# Patient Record
Sex: MALE | Race: WHITE | Employment: OTHER | URBAN - METROPOLITAN AREA
[De-identification: names, ages, dates, MRNs, and addresses within clinical notes are randomized per-mention and may not be internally consistent; named-entity substitution may affect disease eponyms.]

---

## 2019-10-19 ENCOUNTER — APPOINTMENT (OUTPATIENT)
Dept: CT IMAGING | Age: 84
DRG: 390 | End: 2019-10-19
Payer: MEDICARE

## 2019-10-19 ENCOUNTER — APPOINTMENT (OUTPATIENT)
Dept: GENERAL RADIOLOGY | Age: 84
DRG: 390 | End: 2019-10-19
Payer: MEDICARE

## 2019-10-19 ENCOUNTER — HOSPITAL ENCOUNTER (INPATIENT)
Age: 84
LOS: 3 days | Discharge: HOME | DRG: 390 | End: 2019-10-22
Attending: INTERNAL MEDICINE | Admitting: INTERNAL MEDICINE
Payer: MEDICARE

## 2019-10-19 DIAGNOSIS — K56.609 SBO (SMALL BOWEL OBSTRUCTION) (HCC): Primary | ICD-10-CM

## 2019-10-19 PROBLEM — R10.9 BELLY PAIN: Status: ACTIVE | Noted: 2019-10-19

## 2019-10-19 LAB
A/G RATIO: 1.8 (ref 1.1–2.2)
ALBUMIN SERPL-MCNC: 4.6 G/DL (ref 3.4–5)
ALP BLD-CCNC: 44 U/L (ref 40–129)
ALT SERPL-CCNC: <5 U/L (ref 10–40)
ANION GAP SERPL CALCULATED.3IONS-SCNC: 13 MMOL/L (ref 3–16)
AST SERPL-CCNC: 26 U/L (ref 15–37)
BASOPHILS ABSOLUTE: 0 K/UL (ref 0–0.2)
BASOPHILS RELATIVE PERCENT: 0.1 %
BILIRUB SERPL-MCNC: 1.2 MG/DL (ref 0–1)
BILIRUBIN URINE: NEGATIVE
BLOOD, URINE: ABNORMAL
BUN BLDV-MCNC: 19 MG/DL (ref 7–20)
CALCIUM SERPL-MCNC: 9.5 MG/DL (ref 8.3–10.6)
CHLORIDE BLD-SCNC: 96 MMOL/L (ref 99–110)
CLARITY: CLEAR
CO2: 25 MMOL/L (ref 21–32)
COLOR: ABNORMAL
CREAT SERPL-MCNC: 0.8 MG/DL (ref 0.8–1.3)
EOSINOPHILS ABSOLUTE: 0 K/UL (ref 0–0.6)
EOSINOPHILS RELATIVE PERCENT: 0 %
EPITHELIAL CELLS, UA: 1 /HPF (ref 0–5)
GFR AFRICAN AMERICAN: >60
GFR NON-AFRICAN AMERICAN: >60
GLOBULIN: 2.5 G/DL
GLUCOSE BLD-MCNC: 116 MG/DL (ref 70–99)
GLUCOSE URINE: NEGATIVE MG/DL
HCT VFR BLD CALC: 46.9 % (ref 40.5–52.5)
HEMOGLOBIN: 15.6 G/DL (ref 13.5–17.5)
HYALINE CASTS: 9 /LPF (ref 0–8)
INR BLD: 2.14 (ref 0.86–1.14)
KETONES, URINE: 40 MG/DL
LEUKOCYTE ESTERASE, URINE: NEGATIVE
LIPASE: 35 U/L (ref 13–60)
LYMPHOCYTES ABSOLUTE: 0.4 K/UL (ref 1–5.1)
LYMPHOCYTES RELATIVE PERCENT: 3.2 %
MCH RBC QN AUTO: 30.6 PG (ref 26–34)
MCHC RBC AUTO-ENTMCNC: 33.3 G/DL (ref 31–36)
MCV RBC AUTO: 91.8 FL (ref 80–100)
MICROSCOPIC EXAMINATION: YES
MONOCYTES ABSOLUTE: 0.6 K/UL (ref 0–1.3)
MONOCYTES RELATIVE PERCENT: 4.9 %
NEUTROPHILS ABSOLUTE: 11.1 K/UL (ref 1.7–7.7)
NEUTROPHILS RELATIVE PERCENT: 91.8 %
NITRITE, URINE: NEGATIVE
PDW BLD-RTO: 15.2 % (ref 12.4–15.4)
PH UA: 5.5 (ref 5–8)
PLATELET # BLD: 229 K/UL (ref 135–450)
PMV BLD AUTO: 8.5 FL (ref 5–10.5)
POTASSIUM REFLEX MAGNESIUM: 4.2 MMOL/L (ref 3.5–5.1)
PROTEIN UA: NEGATIVE MG/DL
PROTHROMBIN TIME: 24.4 SEC (ref 9.8–13)
RBC # BLD: 5.11 M/UL (ref 4.2–5.9)
RBC UA: 6 /HPF (ref 0–4)
SODIUM BLD-SCNC: 134 MMOL/L (ref 136–145)
SPECIFIC GRAVITY UA: 1.02 (ref 1–1.03)
TOTAL PROTEIN: 7.1 G/DL (ref 6.4–8.2)
URINE REFLEX TO CULTURE: YES
URINE TYPE: ABNORMAL
UROBILINOGEN, URINE: 0.2 E.U./DL
WBC # BLD: 12.1 K/UL (ref 4–11)
WBC UA: 18 /HPF (ref 0–5)

## 2019-10-19 PROCEDURE — 6360000002 HC RX W HCPCS: Performed by: INTERNAL MEDICINE

## 2019-10-19 PROCEDURE — 6360000002 HC RX W HCPCS: Performed by: PHYSICIAN ASSISTANT

## 2019-10-19 PROCEDURE — 96375 TX/PRO/DX INJ NEW DRUG ADDON: CPT

## 2019-10-19 PROCEDURE — 87086 URINE CULTURE/COLONY COUNT: CPT

## 2019-10-19 PROCEDURE — 2580000003 HC RX 258: Performed by: INTERNAL MEDICINE

## 2019-10-19 PROCEDURE — 2580000003 HC RX 258: Performed by: PHYSICIAN ASSISTANT

## 2019-10-19 PROCEDURE — 80053 COMPREHEN METABOLIC PANEL: CPT

## 2019-10-19 PROCEDURE — 85025 COMPLETE CBC W/AUTO DIFF WBC: CPT

## 2019-10-19 PROCEDURE — 1200000000 HC SEMI PRIVATE

## 2019-10-19 PROCEDURE — 93005 ELECTROCARDIOGRAM TRACING: CPT | Performed by: EMERGENCY MEDICINE

## 2019-10-19 PROCEDURE — 83690 ASSAY OF LIPASE: CPT

## 2019-10-19 PROCEDURE — 85610 PROTHROMBIN TIME: CPT

## 2019-10-19 PROCEDURE — 74177 CT ABD & PELVIS W/CONTRAST: CPT

## 2019-10-19 PROCEDURE — 96361 HYDRATE IV INFUSION ADD-ON: CPT

## 2019-10-19 PROCEDURE — 6360000004 HC RX CONTRAST MEDICATION: Performed by: PHYSICIAN ASSISTANT

## 2019-10-19 PROCEDURE — 96374 THER/PROPH/DIAG INJ IV PUSH: CPT

## 2019-10-19 PROCEDURE — 81001 URINALYSIS AUTO W/SCOPE: CPT

## 2019-10-19 PROCEDURE — 99285 EMERGENCY DEPT VISIT HI MDM: CPT

## 2019-10-19 RX ORDER — POTASSIUM CHLORIDE 7.45 MG/ML
10 INJECTION INTRAVENOUS PRN
Status: DISCONTINUED | OUTPATIENT
Start: 2019-10-19 | End: 2019-10-22 | Stop reason: HOSPADM

## 2019-10-19 RX ORDER — ONDANSETRON 2 MG/ML
4 INJECTION INTRAMUSCULAR; INTRAVENOUS ONCE
Status: COMPLETED | OUTPATIENT
Start: 2019-10-19 | End: 2019-10-19

## 2019-10-19 RX ORDER — KETOROLAC TROMETHAMINE 30 MG/ML
30 INJECTION, SOLUTION INTRAMUSCULAR; INTRAVENOUS EVERY 6 HOURS PRN
Status: DISCONTINUED | OUTPATIENT
Start: 2019-10-19 | End: 2019-10-21

## 2019-10-19 RX ORDER — 0.9 % SODIUM CHLORIDE 0.9 %
500 INTRAVENOUS SOLUTION INTRAVENOUS ONCE
Status: COMPLETED | OUTPATIENT
Start: 2019-10-19 | End: 2019-10-19

## 2019-10-19 RX ORDER — WARFARIN SODIUM 5 MG/1
10 TABLET ORAL DAILY
Status: DISCONTINUED | OUTPATIENT
Start: 2019-10-20 | End: 2019-10-19 | Stop reason: ALTCHOICE

## 2019-10-19 RX ORDER — MAGNESIUM SULFATE 1 G/100ML
1 INJECTION INTRAVENOUS PRN
Status: DISCONTINUED | OUTPATIENT
Start: 2019-10-19 | End: 2019-10-22 | Stop reason: HOSPADM

## 2019-10-19 RX ORDER — MORPHINE SULFATE 2 MG/ML
2 INJECTION, SOLUTION INTRAMUSCULAR; INTRAVENOUS ONCE
Status: COMPLETED | OUTPATIENT
Start: 2019-10-19 | End: 2019-10-19

## 2019-10-19 RX ORDER — ONDANSETRON 2 MG/ML
4 INJECTION INTRAMUSCULAR; INTRAVENOUS EVERY 6 HOURS PRN
Status: DISCONTINUED | OUTPATIENT
Start: 2019-10-19 | End: 2019-10-19 | Stop reason: SDUPTHER

## 2019-10-19 RX ORDER — 0.9 % SODIUM CHLORIDE 0.9 %
1000 INTRAVENOUS SOLUTION INTRAVENOUS ONCE
Status: COMPLETED | OUTPATIENT
Start: 2019-10-19 | End: 2019-10-19

## 2019-10-19 RX ORDER — ACETAMINOPHEN 650 MG/1
650 SUPPOSITORY RECTAL EVERY 4 HOURS PRN
Status: DISCONTINUED | OUTPATIENT
Start: 2019-10-19 | End: 2019-10-22 | Stop reason: HOSPADM

## 2019-10-19 RX ORDER — ASPIRIN 325 MG
325 TABLET ORAL DAILY
Status: DISCONTINUED | OUTPATIENT
Start: 2019-10-20 | End: 2019-10-20 | Stop reason: DRUGHIGH

## 2019-10-19 RX ORDER — ONDANSETRON 2 MG/ML
4 INJECTION INTRAMUSCULAR; INTRAVENOUS EVERY 6 HOURS PRN
Status: DISCONTINUED | OUTPATIENT
Start: 2019-10-19 | End: 2019-10-22 | Stop reason: HOSPADM

## 2019-10-19 RX ORDER — SODIUM CHLORIDE 9 MG/ML
INJECTION, SOLUTION INTRAVENOUS CONTINUOUS
Status: DISCONTINUED | OUTPATIENT
Start: 2019-10-19 | End: 2019-10-22 | Stop reason: HOSPADM

## 2019-10-19 RX ORDER — METOPROLOL TARTRATE 5 MG/5ML
5 INJECTION INTRAVENOUS EVERY 6 HOURS PRN
Status: DISCONTINUED | OUTPATIENT
Start: 2019-10-19 | End: 2019-10-22 | Stop reason: HOSPADM

## 2019-10-19 RX ORDER — PROMETHAZINE HYDROCHLORIDE 25 MG/ML
6.25 INJECTION, SOLUTION INTRAMUSCULAR; INTRAVENOUS EVERY 6 HOURS PRN
Status: DISCONTINUED | OUTPATIENT
Start: 2019-10-19 | End: 2019-10-22 | Stop reason: HOSPADM

## 2019-10-19 RX ORDER — SODIUM CHLORIDE 0.9 % (FLUSH) 0.9 %
10 SYRINGE (ML) INJECTION EVERY 12 HOURS SCHEDULED
Status: DISCONTINUED | OUTPATIENT
Start: 2019-10-19 | End: 2019-10-22 | Stop reason: HOSPADM

## 2019-10-19 RX ORDER — METOPROLOL SUCCINATE 25 MG/1
25 TABLET, EXTENDED RELEASE ORAL DAILY
Status: DISCONTINUED | OUTPATIENT
Start: 2019-10-20 | End: 2019-10-22 | Stop reason: HOSPADM

## 2019-10-19 RX ORDER — 0.9 % SODIUM CHLORIDE 0.9 %
500 INTRAVENOUS SOLUTION INTRAVENOUS ONCE
Status: COMPLETED | OUTPATIENT
Start: 2019-10-19 | End: 2019-10-20

## 2019-10-19 RX ORDER — MORPHINE SULFATE 2 MG/ML
2 INJECTION, SOLUTION INTRAMUSCULAR; INTRAVENOUS EVERY 4 HOURS PRN
Status: DISCONTINUED | OUTPATIENT
Start: 2019-10-19 | End: 2019-10-21

## 2019-10-19 RX ORDER — SODIUM CHLORIDE 0.9 % (FLUSH) 0.9 %
10 SYRINGE (ML) INJECTION PRN
Status: DISCONTINUED | OUTPATIENT
Start: 2019-10-19 | End: 2019-10-22 | Stop reason: HOSPADM

## 2019-10-19 RX ORDER — POTASSIUM CHLORIDE 20 MEQ/1
40 TABLET, EXTENDED RELEASE ORAL PRN
Status: DISCONTINUED | OUTPATIENT
Start: 2019-10-19 | End: 2019-10-22 | Stop reason: HOSPADM

## 2019-10-19 RX ADMIN — SODIUM CHLORIDE: 9 INJECTION, SOLUTION INTRAVENOUS at 22:34

## 2019-10-19 RX ADMIN — IOPAMIDOL 75 ML: 755 INJECTION, SOLUTION INTRAVENOUS at 18:41

## 2019-10-19 RX ADMIN — MORPHINE SULFATE 2 MG: 2 INJECTION, SOLUTION INTRAMUSCULAR; INTRAVENOUS at 17:43

## 2019-10-19 RX ADMIN — SODIUM CHLORIDE 500 ML: 9 INJECTION, SOLUTION INTRAVENOUS at 17:47

## 2019-10-19 RX ADMIN — ONDANSETRON 4 MG: 2 INJECTION INTRAMUSCULAR; INTRAVENOUS at 17:43

## 2019-10-19 RX ADMIN — KETOROLAC TROMETHAMINE 30 MG: 30 INJECTION, SOLUTION INTRAMUSCULAR at 22:57

## 2019-10-19 RX ADMIN — SODIUM CHLORIDE 500 ML: 9 INJECTION, SOLUTION INTRAVENOUS at 22:35

## 2019-10-19 RX ADMIN — SODIUM CHLORIDE 1000 ML: 9 INJECTION, SOLUTION INTRAVENOUS at 21:33

## 2019-10-19 ASSESSMENT — PAIN DESCRIPTION - FREQUENCY
FREQUENCY: CONTINUOUS

## 2019-10-19 ASSESSMENT — PAIN DESCRIPTION - ORIENTATION
ORIENTATION: LOWER

## 2019-10-19 ASSESSMENT — PAIN DESCRIPTION - PAIN TYPE
TYPE: ACUTE PAIN

## 2019-10-19 ASSESSMENT — ENCOUNTER SYMPTOMS
DIARRHEA: 0
BACK PAIN: 0
ABDOMINAL PAIN: 1
SHORTNESS OF BREATH: 0
VOMITING: 0
CONSTIPATION: 1
NAUSEA: 1

## 2019-10-19 ASSESSMENT — PAIN SCALES - GENERAL
PAINLEVEL_OUTOF10: 3
PAINLEVEL_OUTOF10: 0
PAINLEVEL_OUTOF10: 3
PAINLEVEL_OUTOF10: 4
PAINLEVEL_OUTOF10: 4

## 2019-10-19 ASSESSMENT — PAIN DESCRIPTION - DESCRIPTORS
DESCRIPTORS: ACHING

## 2019-10-19 ASSESSMENT — PAIN DESCRIPTION - ONSET
ONSET: ON-GOING
ONSET: ON-GOING

## 2019-10-19 ASSESSMENT — PAIN DESCRIPTION - LOCATION
LOCATION: ABDOMEN

## 2019-10-19 ASSESSMENT — PAIN - FUNCTIONAL ASSESSMENT: PAIN_FUNCTIONAL_ASSESSMENT: ACTIVITIES ARE NOT PREVENTED

## 2019-10-19 ASSESSMENT — PAIN DESCRIPTION - PROGRESSION
CLINICAL_PROGRESSION: GRADUALLY WORSENING
CLINICAL_PROGRESSION: NOT CHANGED
CLINICAL_PROGRESSION: GRADUALLY IMPROVING
CLINICAL_PROGRESSION: GRADUALLY WORSENING

## 2019-10-20 ENCOUNTER — APPOINTMENT (OUTPATIENT)
Dept: GENERAL RADIOLOGY | Age: 84
DRG: 390 | End: 2019-10-20
Payer: MEDICARE

## 2019-10-20 LAB
ANION GAP SERPL CALCULATED.3IONS-SCNC: 12 MMOL/L (ref 3–16)
BUN BLDV-MCNC: 18 MG/DL (ref 7–20)
CALCIUM SERPL-MCNC: 8.5 MG/DL (ref 8.3–10.6)
CHLORIDE BLD-SCNC: 103 MMOL/L (ref 99–110)
CO2: 22 MMOL/L (ref 21–32)
CREAT SERPL-MCNC: 0.7 MG/DL (ref 0.8–1.3)
EKG ATRIAL RATE: 66 BPM
EKG DIAGNOSIS: NORMAL
EKG P AXIS: 27 DEGREES
EKG P-R INTERVAL: 218 MS
EKG Q-T INTERVAL: 470 MS
EKG QRS DURATION: 116 MS
EKG QTC CALCULATION (BAZETT): 492 MS
EKG R AXIS: -26 DEGREES
EKG T AXIS: 99 DEGREES
EKG VENTRICULAR RATE: 66 BPM
GFR AFRICAN AMERICAN: >60
GFR NON-AFRICAN AMERICAN: >60
GLUCOSE BLD-MCNC: 78 MG/DL (ref 70–99)
INR BLD: 2.41 (ref 0.86–1.14)
LACTIC ACID: 1 MMOL/L (ref 0.4–2)
MAGNESIUM: 2 MG/DL (ref 1.8–2.4)
POTASSIUM REFLEX MAGNESIUM: 4.1 MMOL/L (ref 3.5–5.1)
PROTHROMBIN TIME: 27.5 SEC (ref 9.8–13)
SODIUM BLD-SCNC: 137 MMOL/L (ref 136–145)
URINE CULTURE, ROUTINE: NORMAL

## 2019-10-20 PROCEDURE — 80048 BASIC METABOLIC PNL TOTAL CA: CPT

## 2019-10-20 PROCEDURE — 83735 ASSAY OF MAGNESIUM: CPT

## 2019-10-20 PROCEDURE — 6370000000 HC RX 637 (ALT 250 FOR IP): Performed by: INTERNAL MEDICINE

## 2019-10-20 PROCEDURE — 83605 ASSAY OF LACTIC ACID: CPT

## 2019-10-20 PROCEDURE — 85610 PROTHROMBIN TIME: CPT

## 2019-10-20 PROCEDURE — 74019 RADEX ABDOMEN 2 VIEWS: CPT

## 2019-10-20 PROCEDURE — 99222 1ST HOSP IP/OBS MODERATE 55: CPT | Performed by: SURGERY

## 2019-10-20 PROCEDURE — 93010 ELECTROCARDIOGRAM REPORT: CPT | Performed by: INTERNAL MEDICINE

## 2019-10-20 PROCEDURE — 1200000000 HC SEMI PRIVATE

## 2019-10-20 PROCEDURE — 36415 COLL VENOUS BLD VENIPUNCTURE: CPT

## 2019-10-20 RX ORDER — ASPIRIN 81 MG/1
81 TABLET ORAL DAILY
Status: DISCONTINUED | OUTPATIENT
Start: 2019-10-21 | End: 2019-10-22 | Stop reason: HOSPADM

## 2019-10-20 RX ORDER — WARFARIN SODIUM 7.5 MG/1
7.5 TABLET ORAL
Status: COMPLETED | OUTPATIENT
Start: 2019-10-20 | End: 2019-10-20

## 2019-10-20 RX ORDER — WARFARIN SODIUM 5 MG/1
5 TABLET ORAL
Status: DISCONTINUED | OUTPATIENT
Start: 2019-10-20 | End: 2019-10-20 | Stop reason: DRUGHIGH

## 2019-10-20 RX ADMIN — CARBIDOPA AND LEVODOPA 2 TABLET: 25; 100 TABLET ORAL at 09:24

## 2019-10-20 RX ADMIN — CARBIDOPA AND LEVODOPA 2 TABLET: 25; 100 TABLET ORAL at 17:26

## 2019-10-20 RX ADMIN — CARBIDOPA AND LEVODOPA 2 TABLET: 25; 100 TABLET ORAL at 12:05

## 2019-10-20 RX ADMIN — WARFARIN SODIUM 7.5 MG: 7.5 TABLET ORAL at 17:25

## 2019-10-20 RX ADMIN — METOPROLOL SUCCINATE 25 MG: 25 TABLET, EXTENDED RELEASE ORAL at 09:24

## 2019-10-20 RX ADMIN — CARBIDOPA AND LEVODOPA 2 TABLET: 25; 100 TABLET ORAL at 20:03

## 2019-10-20 RX ADMIN — ASPIRIN 325 MG ORAL TABLET 325 MG: 325 PILL ORAL at 09:24

## 2019-10-20 ASSESSMENT — PAIN SCALES - GENERAL: PAINLEVEL_OUTOF10: 0

## 2019-10-21 PROBLEM — K56.609 SBO (SMALL BOWEL OBSTRUCTION) (HCC): Status: ACTIVE | Noted: 2019-10-21

## 2019-10-21 LAB
BILIRUBIN URINE: NEGATIVE
BLOOD, URINE: NEGATIVE
CLARITY: CLEAR
COLOR: YELLOW
GLUCOSE URINE: NEGATIVE MG/DL
INR BLD: 2.42 (ref 0.86–1.14)
KETONES, URINE: NEGATIVE MG/DL
LEUKOCYTE ESTERASE, URINE: NEGATIVE
MICROSCOPIC EXAMINATION: NORMAL
NITRITE, URINE: NEGATIVE
PH UA: 5.5 (ref 5–8)
PROTEIN UA: NEGATIVE MG/DL
PROTHROMBIN TIME: 27.6 SEC (ref 9.8–13)
SPECIFIC GRAVITY UA: 1.01 (ref 1–1.03)
URINE TYPE: NORMAL
UROBILINOGEN, URINE: 0.2 E.U./DL

## 2019-10-21 PROCEDURE — 97116 GAIT TRAINING THERAPY: CPT

## 2019-10-21 PROCEDURE — 6370000000 HC RX 637 (ALT 250 FOR IP): Performed by: INTERNAL MEDICINE

## 2019-10-21 PROCEDURE — 36415 COLL VENOUS BLD VENIPUNCTURE: CPT

## 2019-10-21 PROCEDURE — 99232 SBSQ HOSP IP/OBS MODERATE 35: CPT | Performed by: SURGERY

## 2019-10-21 PROCEDURE — 96361 HYDRATE IV INFUSION ADD-ON: CPT

## 2019-10-21 PROCEDURE — 94760 N-INVAS EAR/PLS OXIMETRY 1: CPT

## 2019-10-21 PROCEDURE — 85610 PROTHROMBIN TIME: CPT

## 2019-10-21 PROCEDURE — 97161 PT EVAL LOW COMPLEX 20 MIN: CPT

## 2019-10-21 PROCEDURE — 1200000000 HC SEMI PRIVATE

## 2019-10-21 PROCEDURE — 97535 SELF CARE MNGMENT TRAINING: CPT

## 2019-10-21 PROCEDURE — 2580000003 HC RX 258: Performed by: INTERNAL MEDICINE

## 2019-10-21 PROCEDURE — 81003 URINALYSIS AUTO W/O SCOPE: CPT

## 2019-10-21 PROCEDURE — 97165 OT EVAL LOW COMPLEX 30 MIN: CPT

## 2019-10-21 RX ORDER — WARFARIN SODIUM 5 MG/1
5 TABLET ORAL
Status: COMPLETED | OUTPATIENT
Start: 2019-10-21 | End: 2019-10-21

## 2019-10-21 RX ADMIN — CARBIDOPA AND LEVODOPA 2 TABLET: 25; 100 TABLET ORAL at 12:55

## 2019-10-21 RX ADMIN — METOPROLOL SUCCINATE 25 MG: 25 TABLET, EXTENDED RELEASE ORAL at 08:54

## 2019-10-21 RX ADMIN — CARBIDOPA AND LEVODOPA 2 TABLET: 25; 100 TABLET ORAL at 08:52

## 2019-10-21 RX ADMIN — SODIUM CHLORIDE: 9 INJECTION, SOLUTION INTRAVENOUS at 15:28

## 2019-10-21 RX ADMIN — ASPIRIN 81 MG: 81 TABLET, COATED ORAL at 08:52

## 2019-10-21 RX ADMIN — CARBIDOPA AND LEVODOPA 2 TABLET: 25; 100 TABLET ORAL at 17:10

## 2019-10-21 RX ADMIN — CARBIDOPA AND LEVODOPA 2 TABLET: 25; 100 TABLET ORAL at 20:06

## 2019-10-21 RX ADMIN — WARFARIN SODIUM 5 MG: 5 TABLET ORAL at 17:09

## 2019-10-21 ASSESSMENT — PAIN SCALES - GENERAL
PAINLEVEL_OUTOF10: 0
PAINLEVEL_OUTOF10: 0

## 2019-10-21 ASSESSMENT — ENCOUNTER SYMPTOMS: NAUSEA: 0

## 2019-10-22 VITALS
HEIGHT: 70 IN | TEMPERATURE: 97.6 F | DIASTOLIC BLOOD PRESSURE: 69 MMHG | SYSTOLIC BLOOD PRESSURE: 126 MMHG | OXYGEN SATURATION: 97 % | BODY MASS INDEX: 25.28 KG/M2 | HEART RATE: 58 BPM | RESPIRATION RATE: 16 BRPM | WEIGHT: 176.59 LBS

## 2019-10-22 LAB
A/G RATIO: 1.4 (ref 1.1–2.2)
ALBUMIN SERPL-MCNC: 3.4 G/DL (ref 3.4–5)
ALP BLD-CCNC: 38 U/L (ref 40–129)
ALT SERPL-CCNC: <5 U/L (ref 10–40)
ANION GAP SERPL CALCULATED.3IONS-SCNC: 12 MMOL/L (ref 3–16)
AST SERPL-CCNC: 26 U/L (ref 15–37)
BASOPHILS ABSOLUTE: 0 K/UL (ref 0–0.2)
BASOPHILS RELATIVE PERCENT: 0.4 %
BILIRUB SERPL-MCNC: 0.6 MG/DL (ref 0–1)
BUN BLDV-MCNC: 14 MG/DL (ref 7–20)
CALCIUM SERPL-MCNC: 8.3 MG/DL (ref 8.3–10.6)
CHLORIDE BLD-SCNC: 107 MMOL/L (ref 99–110)
CO2: 25 MMOL/L (ref 21–32)
CREAT SERPL-MCNC: 0.8 MG/DL (ref 0.8–1.3)
EOSINOPHILS ABSOLUTE: 0 K/UL (ref 0–0.6)
EOSINOPHILS RELATIVE PERCENT: 0.4 %
GFR AFRICAN AMERICAN: >60
GFR NON-AFRICAN AMERICAN: >60
GLOBULIN: 2.4 G/DL
GLUCOSE BLD-MCNC: 86 MG/DL (ref 70–99)
HCT VFR BLD CALC: 42.2 % (ref 40.5–52.5)
HEMOGLOBIN: 14.2 G/DL (ref 13.5–17.5)
INR BLD: 2.52 (ref 0.86–1.14)
LYMPHOCYTES ABSOLUTE: 1.4 K/UL (ref 1–5.1)
LYMPHOCYTES RELATIVE PERCENT: 20.2 %
MCH RBC QN AUTO: 31 PG (ref 26–34)
MCHC RBC AUTO-ENTMCNC: 33.5 G/DL (ref 31–36)
MCV RBC AUTO: 92.4 FL (ref 80–100)
MONOCYTES ABSOLUTE: 0.8 K/UL (ref 0–1.3)
MONOCYTES RELATIVE PERCENT: 11.6 %
NEUTROPHILS ABSOLUTE: 4.6 K/UL (ref 1.7–7.7)
NEUTROPHILS RELATIVE PERCENT: 67.4 %
PDW BLD-RTO: 15.1 % (ref 12.4–15.4)
PLATELET # BLD: 203 K/UL (ref 135–450)
PMV BLD AUTO: 8.6 FL (ref 5–10.5)
POTASSIUM SERPL-SCNC: 4.4 MMOL/L (ref 3.5–5.1)
PROTHROMBIN TIME: 28.7 SEC (ref 9.8–13)
RBC # BLD: 4.57 M/UL (ref 4.2–5.9)
SODIUM BLD-SCNC: 144 MMOL/L (ref 136–145)
TOTAL PROTEIN: 5.8 G/DL (ref 6.4–8.2)
WBC # BLD: 6.8 K/UL (ref 4–11)

## 2019-10-22 PROCEDURE — 80053 COMPREHEN METABOLIC PANEL: CPT

## 2019-10-22 PROCEDURE — 96361 HYDRATE IV INFUSION ADD-ON: CPT

## 2019-10-22 PROCEDURE — 36415 COLL VENOUS BLD VENIPUNCTURE: CPT

## 2019-10-22 PROCEDURE — 85610 PROTHROMBIN TIME: CPT

## 2019-10-22 PROCEDURE — APPSS15 APP SPLIT SHARED TIME 0-15 MINUTES: Performed by: PHYSICIAN ASSISTANT

## 2019-10-22 PROCEDURE — 2580000003 HC RX 258: Performed by: INTERNAL MEDICINE

## 2019-10-22 PROCEDURE — 6370000000 HC RX 637 (ALT 250 FOR IP): Performed by: INTERNAL MEDICINE

## 2019-10-22 PROCEDURE — 85025 COMPLETE CBC W/AUTO DIFF WBC: CPT

## 2019-10-22 PROCEDURE — APPNB30 APP NON BILLABLE TIME 0-30 MINS: Performed by: PHYSICIAN ASSISTANT

## 2019-10-22 RX ORDER — WARFARIN SODIUM 5 MG/1
10 TABLET ORAL
Status: DISCONTINUED | OUTPATIENT
Start: 2019-10-22 | End: 2019-10-22 | Stop reason: HOSPADM

## 2019-10-22 RX ADMIN — ASPIRIN 81 MG: 81 TABLET, COATED ORAL at 08:10

## 2019-10-22 RX ADMIN — CARBIDOPA AND LEVODOPA 2 TABLET: 25; 100 TABLET ORAL at 12:06

## 2019-10-22 RX ADMIN — CARBIDOPA AND LEVODOPA 2 TABLET: 25; 100 TABLET ORAL at 08:09

## 2019-10-22 RX ADMIN — SODIUM CHLORIDE: 9 INJECTION, SOLUTION INTRAVENOUS at 01:18

## 2019-10-22 RX ADMIN — METOPROLOL SUCCINATE 25 MG: 25 TABLET, EXTENDED RELEASE ORAL at 08:10

## 2019-10-22 ASSESSMENT — PAIN SCALES - GENERAL: PAINLEVEL_OUTOF10: 0

## 2019-10-24 ENCOUNTER — TELEPHONE (OUTPATIENT)
Dept: FAMILY MEDICINE CLINIC | Age: 84
End: 2019-10-24

## 2019-11-14 ENCOUNTER — OFFICE VISIT (OUTPATIENT)
Dept: FAMILY MEDICINE CLINIC | Age: 84
End: 2019-11-14
Payer: COMMERCIAL

## 2019-11-14 VITALS
SYSTOLIC BLOOD PRESSURE: 138 MMHG | DIASTOLIC BLOOD PRESSURE: 83 MMHG | HEIGHT: 70 IN | BODY MASS INDEX: 25.05 KG/M2 | WEIGHT: 175 LBS

## 2019-11-14 DIAGNOSIS — Z76.89 ENCOUNTER TO ESTABLISH CARE: Primary | ICD-10-CM

## 2019-11-14 DIAGNOSIS — Z95.2 HX OF ARTIFICIAL HEART VALVE REPLACEMENT: ICD-10-CM

## 2019-11-14 DIAGNOSIS — K56.609 SBO (SMALL BOWEL OBSTRUCTION) (HCC): ICD-10-CM

## 2019-11-14 DIAGNOSIS — G20 PARKINSON DISEASE (HCC): ICD-10-CM

## 2019-11-14 LAB
INTERNATIONAL NORMALIZATION RATIO, POC: 2.7
PROTHROMBIN TIME, POC: 31.9

## 2019-11-14 PROCEDURE — 99204 OFFICE O/P NEW MOD 45 MIN: CPT | Performed by: FAMILY MEDICINE

## 2019-11-14 PROCEDURE — 85610 PROTHROMBIN TIME: CPT | Performed by: FAMILY MEDICINE

## 2019-11-14 ASSESSMENT — ENCOUNTER SYMPTOMS
ABDOMINAL PAIN: 0
COUGH: 0
SHORTNESS OF BREATH: 0
NAUSEA: 0
CHEST TIGHTNESS: 0
BACK PAIN: 1
VOMITING: 0
SINUS PRESSURE: 0
WHEEZING: 0
SORE THROAT: 0
DIARRHEA: 0
EYE DISCHARGE: 0
ANAL BLEEDING: 0
CONSTIPATION: 0
TROUBLE SWALLOWING: 0
BLOOD IN STOOL: 0
RHINORRHEA: 0
COLOR CHANGE: 0
RECTAL PAIN: 0

## 2019-11-14 ASSESSMENT — PATIENT HEALTH QUESTIONNAIRE - PHQ9
1. LITTLE INTEREST OR PLEASURE IN DOING THINGS: 0
SUM OF ALL RESPONSES TO PHQ9 QUESTIONS 1 & 2: 0
2. FEELING DOWN, DEPRESSED OR HOPELESS: 0
SUM OF ALL RESPONSES TO PHQ QUESTIONS 1-9: 0
SUM OF ALL RESPONSES TO PHQ QUESTIONS 1-9: 0

## 2019-12-19 ENCOUNTER — TELEPHONE (OUTPATIENT)
Dept: FAMILY MEDICINE CLINIC | Age: 84
End: 2019-12-19

## 2019-12-19 NOTE — TELEPHONE ENCOUNTER
Pt has his protime done on 11/14/19. He would like a return call letting him know when to come back.  Please give pt a call [phone]

## 2019-12-19 NOTE — TELEPHONE ENCOUNTER
Sherry Ortega he was supposed to make an appointment up from to come back in. Let them know up front.   Thank you,   Dr. Chris Toro

## 2020-01-06 ENCOUNTER — NURSE ONLY (OUTPATIENT)
Dept: FAMILY MEDICINE CLINIC | Age: 85
End: 2020-01-06
Payer: MEDICARE

## 2020-01-06 LAB
INTERNATIONAL NORMALIZATION RATIO, POC: 2.9
PROTHROMBIN TIME, POC: 34.2

## 2020-01-06 PROCEDURE — 85610 PROTHROMBIN TIME: CPT | Performed by: FAMILY MEDICINE

## 2020-01-14 ENCOUNTER — OFFICE VISIT (OUTPATIENT)
Dept: CARDIOLOGY CLINIC | Age: 85
End: 2020-01-14
Payer: MEDICARE

## 2020-01-14 VITALS
HEART RATE: 63 BPM | DIASTOLIC BLOOD PRESSURE: 62 MMHG | SYSTOLIC BLOOD PRESSURE: 134 MMHG | HEIGHT: 70 IN | WEIGHT: 174 LBS | BODY MASS INDEX: 24.91 KG/M2 | OXYGEN SATURATION: 96 %

## 2020-01-14 PROCEDURE — 99204 OFFICE O/P NEW MOD 45 MIN: CPT | Performed by: INTERNAL MEDICINE

## 2020-01-14 PROCEDURE — 93000 ELECTROCARDIOGRAM COMPLETE: CPT | Performed by: INTERNAL MEDICINE

## 2020-01-14 RX ORDER — METOPROLOL SUCCINATE 25 MG/1
25 TABLET, EXTENDED RELEASE ORAL NIGHTLY
Qty: 30 TABLET | Refills: 6 | Status: SHIPPED
Start: 2020-01-14 | End: 2020-06-18 | Stop reason: SDUPTHER

## 2020-01-14 NOTE — PROGRESS NOTES
[facility]  Cardiology Consult    Oz Richmond  1934 January 14, 2020    PCP: Dr. Brandi Holder   Former Cardiologist: Dr. Ami Hartmann     Reason for Referral: hx of AVR 1991    CC: \"I had a valve replacement. \"       Subjective:     History of Present Illness:    Oz Richmond is a 80 y.o. patient with a PMH significant for Parkinson's, HTN, mechanical AVR 1991 in [address] [place] [address] on Coumadin (PCP managed), Aspirin, Toprol-xl  therapy. No hx of CAD. Last echocardiogram 7/23/19 [facility]. Accompanied by his wife today. Reports that he is doing well with no cardiac complaints. Compliant with medical therapy and feels he is tolerating. He notes that after clustering his medical therapy, sometime he does not feel well. No abnormal bruising or bleeding. Exercises regularly. up to date on yearly vaccines. Patient denies any symptoms of chest pain, pressure, tightness, shortness of breath at rest, DALTON, nausea, vomiting, near syncope, syncope, heart racing, palpitations, dizziness, lightheaded, PND, orthopnea, wheezing, diaphoresis, BLE edema, bilateral lower extremities pain, cramping or fatigue. 10/2019 SBO. Past Medical History:   has a past medical history of Colon polyps, Mechanical heart valve present, and S/P aortic valve replacement with metallic valve. Surgical History:   has a past surgical history that includes Aortic valve replacement; Colonoscopy (2008); Colonoscopy (8-); eye surgery; and Colonoscopy (2018). Social History:   reports that he has never smoked. He has never used smokeless tobacco. He reports current alcohol use of about 1.0 standard drinks of alcohol per week. He reports that he does not use drugs.      Family History:  family history includes Breast Cancer in his mother and sister; Cancer in his mother; Heart Disease in his mother; Parkinsonism in his father; Prostate Cancer in his maternal grandfather; Stomach Cancer in his paternal grandfather. Home Medications:  Were reviewed and are listed in nursing record and/or below  Prior to Admission medications    Medication Sig Start Date End Date Taking? Authorizing Provider   aspirin 81 MG tablet Take 81 mg by mouth daily    Historical Provider, MD   carbidopa-levodopa (SINEMET)  MG per tablet Take 2 tablets by mouth 4 times daily  8/14/19   Historical Provider, MD   metoprolol (TOPROL XL) 25 MG XL tablet Take 25 mg by mouth daily. Historical Provider, MD   warfarin (COUMADIN) 10 MG tablet Take 5-10 mg by mouth See Admin Instructions 5 mg on MON/WED/FRI and 10 mg ALL OTHER DAYS    Historical Provider, MD        CURRENT Medications:  No current facility-administered medications for this visit. Allergies:  Amlodipine and Codeine           Review of Systems: SEE HPI   · Constitutional: no unanticipated weight loss. There's been no change in energy level, sleep pattern, or activity level. No fevers, chills. · Eyes: No visual changes or diplopia. No scleral icterus. · ENT: No Headaches, hearing loss or vertigo. No mouth sores or sore throat. · Cardiovascular: No Chest pain, tightness or discomfort.  No Shortness of breath. No Dyspnea on exertion, Orthopnea, Paroxysmal nocturnal dyspnea or breathlessness at rest.   No Palpitations.  No Syncope ('blackouts', 'faints', 'collapse') or dizziness. · Respiratory: No cough or wheezing, no sputum production. No hematemesis. · Gastrointestinal: No abdominal pain, appetite loss, blood in stools. No change in bowel or bladder habits. · Genitourinary: No dysuria, trouble voiding, or hematuria. · Musculoskeletal:  No gait disturbance, no joint complaints. · Integumentary: No rash or pruritis. · Neurological: No headache, diplopia, change in muscle strength, numbness or tingling. · Psychiatric: No anxiety or depression. · Endocrine: No temperature intolerance. No excessive thirst, fluid intake, or urination.  No

## 2020-05-21 ENCOUNTER — NURSE ONLY (OUTPATIENT)
Dept: FAMILY MEDICINE CLINIC | Age: 85
End: 2020-05-21
Payer: MEDICARE

## 2020-05-21 ENCOUNTER — TELEPHONE (OUTPATIENT)
Dept: FAMILY MEDICINE CLINIC | Age: 85
End: 2020-05-21

## 2020-05-21 LAB
INTERNATIONAL NORMALIZATION RATIO, POC: 2.2
PROTHROMBIN TIME, POC: NORMAL

## 2020-05-21 PROCEDURE — 85610 PROTHROMBIN TIME: CPT | Performed by: FAMILY MEDICINE

## 2020-05-21 NOTE — TELEPHONE ENCOUNTER
Pt is calling to see when he should come in for his protime. He is on Warfarin 10 mg 4 days a week and 5 mg 3 days a week. The last time it was checked was 1/6/20.  Please give pt a call [phone]

## 2020-06-18 ENCOUNTER — VIRTUAL VISIT (OUTPATIENT)
Dept: FAMILY MEDICINE CLINIC | Age: 85
End: 2020-06-18
Payer: MEDICARE

## 2020-06-18 PROCEDURE — 99213 OFFICE O/P EST LOW 20 MIN: CPT | Performed by: FAMILY MEDICINE

## 2020-06-18 RX ORDER — METOPROLOL SUCCINATE 25 MG/1
25 TABLET, EXTENDED RELEASE ORAL NIGHTLY
Qty: 90 TABLET | Refills: 3 | Status: SHIPPED | OUTPATIENT
Start: 2020-06-18 | End: 2021-06-15

## 2020-06-18 NOTE — PROGRESS NOTES
2020    TELEHEALTH EVALUATION -- Audio/Visual (During [id number] public health emergency)    HPI:    Oz Richmond (:  1934) has requested an audio/video evaluation for the following concern(s):    1. Aortic valve- caumadin 10 mg  four times a week, 5 mg three times a week, last INR was wnl, he is wanting it checked today, recently saw his new  Cardiologist, feels well and doesn't have a concern.      2.  Parkinsons disease- tremors at rest, ambulation is wnl, follows with Neurology at [facility], feels it is well controlled on the medication, denied a side effect from the medication, takes it as prescribed. Today, denied chest pain, sob, n, v, or diarrhea. Review of Systems   Constitutional: Negative for activity change, fatigue, fever and unexpected weight change. HENT: Negative for congestion, ear pain and rhinorrhea. Respiratory: Negative for shortness of breath. Cardiovascular: Negative for chest pain. Gastrointestinal: Negative for abdominal pain, diarrhea, nausea and vomiting. Musculoskeletal: Negative for arthralgias. Neurological: Negative for light-headedness and headaches. Psychiatric/Behavioral: Negative for dysphoric mood. The patient is not nervous/anxious. Prior to Visit Medications    Medication Sig Taking? Authorizing Provider   metoprolol succinate (TOPROL XL) 25 MG extended release tablet Take 1 tablet by mouth nightly Yes Tereso Chavez, DO   carbidopa-levodopa (SINEMET)  MG per tablet Take 2 tablets by mouth 4 times daily Yes Jesus Grandchild, DO   aspirin 81 MG tablet Take 81 mg by mouth daily  Historical Provider, MD   warfarin (COUMADIN) 10 MG tablet Take 5-10 mg by mouth See Admin Instructions 5 mg on MON/WED/FRI and 10 mg ALL OTHER DAYS  Historical Provider, MD       Social History     Tobacco Use    Smoking status: Never Smoker    Smokeless tobacco: Never Used   Substance Use Topics    Alcohol use:  Yes     Alcohol/week: 1.0 standard drinks

## 2020-06-20 ASSESSMENT — ENCOUNTER SYMPTOMS
NAUSEA: 0
VOMITING: 0
ABDOMINAL PAIN: 0
RHINORRHEA: 0
DIARRHEA: 0
SHORTNESS OF BREATH: 0

## 2020-07-24 ENCOUNTER — HOSPITAL ENCOUNTER (OUTPATIENT)
Dept: NON INVASIVE DIAGNOSTICS | Age: 85
Discharge: HOME | End: 2020-07-24
Payer: MEDICARE

## 2020-07-24 LAB
LV EF: 55 %
LVEF MODALITY: NORMAL

## 2020-07-24 PROCEDURE — 93306 TTE W/DOPPLER COMPLETE: CPT

## 2020-07-27 ENCOUNTER — TELEPHONE (OUTPATIENT)
Dept: CARDIOLOGY CLINIC | Age: 85
End: 2020-07-27

## 2020-07-27 NOTE — TELEPHONE ENCOUNTER
Toña Lutz was calling back this afternoon returning the MA's call regarding his ECHO results.      You can reach Toña Lutz at #[phone]

## 2020-09-10 ENCOUNTER — TELEPHONE (OUTPATIENT)
Dept: FAMILY MEDICINE CLINIC | Age: 85
End: 2020-09-10

## 2020-09-10 NOTE — TELEPHONE ENCOUNTER
----- Message from Norma Daugherty sent at 9/10/2020  1:15 PM EDT -----  Subject: Appointment Request    Reason for Call: Routine Medicare AWV    QUESTIONS  Type of Appointment? Established Patient  Reason for appointment request? Other - Needs Lab Test  Additional Information for Provider? Patient is driving wife to her appt   with Dr. Zaria Jansen on 9/15 at 10:15. He is wanting a nurse to administer the   pro-time test for HIM on the same day and time. Please call pt to verify   this can be done.  ---------------------------------------------------------------------------  --------------  CALL BACK INFO  What is the best way for the office to contact you? OK to leave message on   voicemail  Preferred Call Back Phone Number? [phone]  ---------------------------------------------------------------------------  --------------  SCRIPT ANSWERS  Relationship to Patient? Self  Appointment reason? Well Care/Follow Ups  Select a Well Care/Follow Ups appointment reason? Adult Physical Exam   [Medicare Annual Wellness   AWV   PAP   Pelvic]  (Is the patient requesting to be seen urgently for their symptoms?)? No  (If the patient is female   ask this question) Are you requesting a pap smear with your physical   exam? No  (Patient is Medicare and requesting Annual Wellness Visit)? Yes  Have you been diagnosed with   tested for   or told that you are suspected of having COVID-19 (Coronavirus)? No  Have you had a fever or taken medication to treat a fever within the past   3 days? No  Have you had a cough   shortness of breath or flu-like symptoms within the past 3 days? No  Do you currently have flu-like symptoms including fever or chills   cough   shortness of breath   or difficulty breathing   or new loss of taste or smell? No  (Service Expert  click yes below to proceed with [email or web address] As Usual   Scheduling)?  Yes

## 2020-09-14 NOTE — TELEPHONE ENCOUNTER
He will need to have labs obtained at the lab down at [facility]. I will place order.   Thank you, Dr. Yung Anne

## 2020-09-15 DIAGNOSIS — Z79.01 ANTICOAGULATED ON COUMADIN: ICD-10-CM

## 2020-09-15 LAB
INR BLD: 2.89 (ref 0.86–1.14)
PROTHROMBIN TIME: 33.9 SEC (ref 10–13.2)

## 2020-11-12 DIAGNOSIS — Z95.2 HX OF ARTIFICIAL HEART VALVE REPLACEMENT: ICD-10-CM

## 2020-11-12 LAB
INR BLD: 2.59 (ref 0.86–1.14)
PROTHROMBIN TIME: 30.3 SEC (ref 10–13.2)

## 2020-12-30 ENCOUNTER — ANTI-COAG VISIT (OUTPATIENT)
Dept: PHARMACY | Age: 85
End: 2020-12-30
Payer: MEDICARE

## 2020-12-30 VITALS — TEMPERATURE: 96 F

## 2020-12-30 LAB — INR BLD: 2.4

## 2020-12-30 PROCEDURE — 85610 PROTHROMBIN TIME: CPT

## 2020-12-30 PROCEDURE — 99211 OFF/OP EST MAY X REQ PHY/QHP: CPT

## 2020-12-30 NOTE — PROGRESS NOTES
Mr. Mariela Adrian is a 80 y.o.  male. Mr. Mariela Adrian had an INR test today. Results were reviewed and appropriate warfarin management was completed. THIS VISIT WAS COMPLETED AS:   []    A VIRTUAL VISIT VIA TELEPHONE IN EFFORTS TO REDUCE THE SPREAD OF COVID-19.  []    A DRIVE-THRU VISIT IN EFFORTS TO REDUCE THE SPREAD OF COVID-19. [x]    AN IN PERSON VISIT. PROTOCOLS WERE FOLLOWED WITH PRECAUTIONS TO REDUCE THE SPREAD OF COVID-19. Patient verifies current dosing regimen: Yes     Warfarin medication reviewed and updated on the patient 's home medication list: Yes   All other medications reviewed and updated on the patient 's home medication list: Yes     Lab Results   Component Value Date    INR 2.40 2020    INR 2.59 (H) 2020    INR 2.89 (H) 09/15/2020       Patient Findings     Negatives:  Signs/symptoms of bleeding, Change in health, Missed doses, Change in medications, Change in diet/appetite, Bruising          Anticoagulation Summary  As of 2020    INR goal:  2.0-3.0   TTR:     INR used for dosin.40 (2020)   Warfarin maintenance plan:  5 mg (10 mg x 0.5) every Mon, Wed, Fri; 10 mg (10 mg x 1) all other days   Weekly warfarin total:  55 mg   Plan last modified:  Gorge Davis (2020)   Next INR check:  2021   Target end date: Indefinite    Indications    Anticoagulated on Coumadin [Z79.01]             Anticoagulation Episode Summary     INR check location:      Preferred lab:      Send INR reminders to:  [facility]    Comments:  EPIC. .. hx of heart valve replacement      Anticoagulation Care Providers     Provider Role Specialty Phone number    Alfred Ospina DO Referring Family Medicine [phone]        Patient is new to [employer]. He has been on warfarin since . Education was reviewed including the following:    ? Limit alcohol. He has a glass of wine most evenings. ? Call with any medication changes including OTCs (starting, stopping, dose changes) and especially antibiotics  ? Vitamin K interaction and the importance of a consistent diet with regards to Vitamin K. He has a salad every other day and a green vegetable most days fo the week. Warfarin plan:   Looks and feels well today. No changes in medications or diet. No bruising or bleeding noted. No change in dose for INR of  2.4. Will see him in 4 weeks for next INR. Description    CONTINUE:Take Warfarin 10 mg daily except 5 mg on Monday, Wednesday and Friday    Call [phone] with signs or symptoms of bleeding or ANY medication changes (including over-the-counter medications or herbal supplements). If significant bleeding occurs please seek immediate medical attention. Keep the number of servings of vitamin K containing foods (dark green, leafy vegetables) the same each week. Please call if this changes. Limit alcohol intake. Please call if this changes. Immunization History   Administered Date(s) Administered    Influenza, High Dose (Fluzone 65 yrs and older) 09/25/2020    Zoster Recombinant (Shingrix) 09/22/2020       Immunization history reviewed and updated:  Yes   Influenza vaccine given:  No:      Reviewed AVS with patient / caregiver.       CLINICAL PHARMACY CONSULT: MED RECONCILIATION/REVIEW ADDENDUM    For Pharmacy Admin Tracking Only    PHSO (orange banner): No  Total # of Interventions Recommended (warfarin changes): 0  (warfarin changes)   (other medication updates)- Updated Order #: 0 Updated Order Reason(s):   (#1 for reviewing INR) - Maintenance Safety Lab Monitoring #: 1  Total Interventions Accepted (warfarin related): 0  Time Spent (min) (round up): 15

## 2021-01-10 ENCOUNTER — TELEPHONE (OUTPATIENT)
Dept: FAMILY MEDICINE CLINIC | Age: 86
End: 2021-01-10

## 2021-01-11 DIAGNOSIS — Z95.2 HX OF ARTIFICIAL HEART VALVE REPLACEMENT: Primary | ICD-10-CM

## 2021-01-12 ENCOUNTER — TELEPHONE (OUTPATIENT)
Dept: FAMILY MEDICINE CLINIC | Age: 86
End: 2021-01-12

## 2021-01-12 NOTE — TELEPHONE ENCOUNTER
Dr. Bradley Persaud had me reach out to coumadin clinic to change parameters for pts INR.  Let message for a returned call          INR changed to 2.5-3.5  Coumadin changed to 10mg 5 days and 5mg 2 days

## 2021-01-20 ENCOUNTER — IMMUNIZATION (OUTPATIENT)
Dept: PRIMARY CARE CLINIC | Age: 86
End: 2021-01-20
Payer: MEDICARE

## 2021-01-20 PROCEDURE — 0001A PR IMM ADMN SARSCOV2 30MCG/0.3ML DIL RECON 1ST DOSE: CPT | Performed by: FAMILY MEDICINE

## 2021-01-20 PROCEDURE — 91300 COVID-19, PFIZER VACCINE 30MCG/0.3ML DOSE: CPT | Performed by: FAMILY MEDICINE

## 2021-01-27 ENCOUNTER — ANTI-COAG VISIT (OUTPATIENT)
Dept: PHARMACY | Age: 86
End: 2021-01-27
Payer: MEDICARE

## 2021-01-27 VITALS — TEMPERATURE: 96.3 F

## 2021-01-27 DIAGNOSIS — Z95.2 HX OF ARTIFICIAL HEART VALVE REPLACEMENT: ICD-10-CM

## 2021-01-27 DIAGNOSIS — Z79.01 ANTICOAGULATED ON COUMADIN: ICD-10-CM

## 2021-01-27 LAB — INR BLD: 2.7

## 2021-01-27 PROCEDURE — 85610 PROTHROMBIN TIME: CPT

## 2021-01-27 PROCEDURE — 99211 OFF/OP EST MAY X REQ PHY/QHP: CPT

## 2021-01-27 NOTE — PROGRESS NOTES
CONTINUE:Take Warfarin 10 mg daily except 5 mg on Monday, Wednesday and Friday    Call [phone] with signs or symptoms of bleeding or ANY medication changes (including over-the-counter medications or herbal supplements). If significant bleeding occurs please seek immediate medical attention. Keep the number of servings of vitamin K containing foods (dark green, leafy vegetables) the same each week. Please call if this changes. Salad every other day and a green vegetable most days of the week. He does not eat dark green leafy vegetables. Limit alcohol intake. He drinks a glass of wine most evenings. Please call if this changes. Immunization History   Administered Date(s) Administered    COVID-19, Pfizer, 30mcg/0.3ml Dose 01/20/2021    Influenza, High Dose (Fluzone 65 yrs and older) 09/25/2020    Zoster Recombinant (Shingrix) 09/22/2020       Reviewed AVS with patient / caregiver.       CLINICAL PHARMACY CONSULT: MED RECONCILIATION/REVIEW ADDENDUM    For Pharmacy Admin Tracking Only    PHSO (orange banner): No  Total # of Interventions Recommended (warfarin changes): 0  (warfarin changes)   (other medication updates)- Updated Order #: 0 Updated Order Reason(s):   (#1 for reviewing INR) - Maintenance Safety Lab Monitoring #: 1  Total Interventions Accepted (warfarin related): 0  Time Spent (min) (round up): 15

## 2021-02-01 NOTE — PROGRESS NOTES
[facility]  1934 February 2, 2021    PCP: Dr. Jessee Cogan   Former Cardiologist: Dr. Hassan Dears     Reason for Referral: hx of AVR 1991    CC: \"I am feeling good. \"       Subjective:     History of Present Illness:    Alicia Lo is a 80 y.o. patient with a PMH significant for Parkinson's, HTN, mechanical AVR 1991 in [address] [place] [address] on Coumadin (PCP managed), Aspirin, Toprol-xl  therapy. Today, he is here for routine follow up. He says that he is feeling good. He admits that he does not exercise as much as he should. Patient denies exertional chest pain/pressure, dyspnea at rest, DALTON, PND, orthopnea, palpitations, lightheadedness, weight changes, changes in LE edema, and syncope. Patient reports compliance to his medications. Past Medical History:   has a past medical history of Colon polyps, Mechanical heart valve present, and S/P aortic valve replacement with metallic valve. Surgical History:   has a past surgical history that includes Aortic valve replacement; Colonoscopy (2008); Colonoscopy (8-); eye surgery; and Colonoscopy (2018). Social History:   reports that he has never smoked. He has never used smokeless tobacco. He reports current alcohol use of about 1.0 standard drinks of alcohol per week. He reports that he does not use drugs. Family History:  family history includes Breast Cancer in his mother and sister; Cancer in his mother; Heart Disease in his mother; Parkinsonism in his father; Prostate Cancer in his maternal grandfather; Stomach Cancer in his paternal grandfather. Home Medications:  Were reviewed and are listed in nursing record and/or below  Prior to Admission medications    Medication Sig Start Date End Date Taking?  Authorizing Provider   metoprolol succinate (TOPROL XL) 25 MG extended release tablet Take 1 tablet by mouth nightly 6/18/20  Yes Carmen Walker, DO   carbidopa-levodopa (SINEMET)  MG per tablet Take 2 tablets by mouth 4 times daily 6/18/20  Yes Carlos Manuel Musa,    aspirin 81 MG tablet Take 81 mg by mouth daily   Yes Historical Provider, MD   warfarin (COUMADIN) 10 MG tablet Take 5-10 mg by mouth See Admin Instructions 5 mg on MON/WED/FRI and 10 mg ALL OTHER DAYS   Yes Historical Provider, MD        CURRENT Medications:  No current facility-administered medications for this visit. Allergies:  Amlodipine and Codeine           Review of Systems: SEE HPI   · Constitutional: no unanticipated weight loss. There's been no change in energy level, sleep pattern, or activity level. No fevers, chills. · Eyes: No visual changes or diplopia. No scleral icterus. · ENT: No Headaches, hearing loss or vertigo. No mouth sores or sore throat. · Cardiovascular: No Chest pain, tightness or discomfort.  No Shortness of breath. No Dyspnea on exertion, Orthopnea, Paroxysmal nocturnal dyspnea or breathlessness at rest.   No Palpitations.  No Syncope ('blackouts', 'faints', 'collapse') or dizziness. · Respiratory: No cough or wheezing, no sputum production. No hematemesis. · Gastrointestinal: No abdominal pain, appetite loss, blood in stools. No change in bowel or bladder habits. · Genitourinary: No dysuria, trouble voiding, or hematuria. · Musculoskeletal:  No gait disturbance, no joint complaints. · Integumentary: No rash or pruritis. · Neurological: No headache, diplopia, change in muscle strength, numbness or tingling. · Psychiatric: No anxiety or depression. · Endocrine: No temperature intolerance. No excessive thirst, fluid intake, or urination. No tremor. · Hematologic/Lymphatic: No abnormal bruising or bleeding, blood clots or swollen lymph nodes. · Allergic/Immunologic: No nasal congestion or hives.       Objective:     PHYSICAL EXAM:      Vitals:    02/02/21 0842   BP: 134/82   Pulse: 63   Temp: 96.9 °F (36.1 °C)   SpO2: 99%   Weight: 179 lb 12.8 oz (81.6 kg)   Height: 5' 10\" (1.778 m)      Weight: 179 lb 12.8 oz (81.6 kg)       General Appearance:  Alert, cooperative, no distress, appears stated age. Head:  Normocephalic, without obvious abnormality, atraumatic. Eyes:  Pupils equal and round. No scleral icterus. Mouth: Moist mucosa, no pharyngeal erythema. Nose: Nares normal. No drainage or sinus tenderness. Neck: Supple, symmetrical, trachea midline. No adenopathy. No tenderness/mass/nodules. No carotid bruit or elevated JVD. Lungs:   Respiratory Effort: Normal   Auscultation: Clear to auscultation bilaterally, respirations unlabored. No wheeze, rales   Chest Wall:  No tenderness or deformity. Cardiovascular:    Pulses  Palpation: normal   Ascultation: Regular rate, mechanical Aortic click  R6/ S2 normal. No murmur, rub, or gallop. 2+ radial and pedal pulses, symmetric  Carotid  Femoral   Abdomen and Gastrointestinal:   Soft, non-tender, bowel sounds active. Liver and Spleen  Masses   Musculoskeletal: No muscle wasting  Back  Gait   Extremities: Extremities normal, atraumatic. No cyanosis or edema. No cyanosis clubbing       Skin: Inspection and palpation performed, no rashes or lesions. Pysch: Normal mood and affect.  Alert and oriented to time place person   Neurologic: Normal gross motor and sensory exam.       Labs     All labs have been reviewed    Lab Results   Component Value Date    WBC 6.8 10/22/2019    RBC 4.57 10/22/2019    HGB 14.2 10/22/2019    HCT 42.2 10/22/2019    MCV 92.4 10/22/2019    RDW 15.1 10/22/2019     10/22/2019     Lab Results   Component Value Date     10/22/2019    K 4.4 10/22/2019    K 4.1 10/20/2019     10/22/2019    CO2 25 10/22/2019    BUN 14 10/22/2019    CREATININE 0.8 10/22/2019    GFRAA >60 10/22/2019    AGRATIO 1.4 10/22/2019    LABGLOM >60 10/22/2019    GLUCOSE 86 10/22/2019    PROT 5.8 10/22/2019    CALCIUM 8.3 10/22/2019    BILITOT 0.6 10/22/2019    ALKPHOS 38 10/22/2019    AST 26 10/22/2019    ALT <5 10/22/2019     No results found for: PTINR  No results found for: LABA1C  No results found for: CKTOTAL, CKMB, CKMBINDEX, TROPONINI    Cardiac, Vascular and Imaging Data all Personally Reviewed in Detail by Myself      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   CONCLUSIONS   Left ventricular cavity size normal.    Moderate concentric left ventricular hypertrophy. Abnormal (paradoxical) septal motion consistent with postoperative state.    The base of the inferior wall is hypokinetic   Left ventricular ejection fraction is in the normal range.    Mild left atrial dilatation. Mechanical prosthetic aortic valve which is well seated   Mildly dilated proximal ascending aorta     ECHO 2020  Concentric LVH with normal LV size and wall motion. EF is   55%. Grade I  diastolic dysfunction with normal LV filling pressures. The left atrium is mildly dilated. A mechanical artificial aortic valve appears well seated with a mean  gradient of 17 mmHg. The right ventricle is normal in size and function. A bubble study was performed and shows evidence of right to left shunting    Stress Test: none     Cath: none     Other imaging:     Assessment and Plan     Mechanical AVR  in [address] [place] [address] on Coumadin (PCP managed). Asymptomatic. Repeat echocardiogram yearly. Hypertension  Controlled. Continue current medical management. Follow up in 1 year. Thank you for allowing us to participate in the care of Heaven Luna. Please do not hesitate to contact me if you have any questions. Jocelyn Lafleur MD, MPH    [address] [place] [address]  Ph: [phone]  Fax: [phone]    This note was scribed in the presence of Dr Frann Schlatter, by Fiordaliza Mcclure RN  Physician Attestation:  The scribes documentation has been prepared under my direction and personally reviewed by me in its entirety.      I confirm that the note above accurately reflects all work, treatment, procedures, and medical decision making performed by me.

## 2021-02-02 ENCOUNTER — OFFICE VISIT (OUTPATIENT)
Dept: CARDIOLOGY CLINIC | Age: 86
End: 2021-02-02
Payer: COMMERCIAL

## 2021-02-02 VITALS
SYSTOLIC BLOOD PRESSURE: 134 MMHG | HEIGHT: 70 IN | BODY MASS INDEX: 25.74 KG/M2 | HEART RATE: 63 BPM | OXYGEN SATURATION: 99 % | WEIGHT: 179.8 LBS | TEMPERATURE: 96.9 F | DIASTOLIC BLOOD PRESSURE: 82 MMHG

## 2021-02-02 DIAGNOSIS — I10 ESSENTIAL HYPERTENSION: ICD-10-CM

## 2021-02-02 DIAGNOSIS — Z95.2 HX OF ARTIFICIAL HEART VALVE REPLACEMENT: Primary | ICD-10-CM

## 2021-02-02 PROCEDURE — 99213 OFFICE O/P EST LOW 20 MIN: CPT | Performed by: INTERNAL MEDICINE

## 2021-02-02 PROCEDURE — 93000 ELECTROCARDIOGRAM COMPLETE: CPT | Performed by: INTERNAL MEDICINE

## 2021-02-02 NOTE — PATIENT INSTRUCTIONS
Patient Education        Heart-Healthy Diet: Care Instructions  Your Care Instructions     A heart-healthy diet has lots of vegetables, fruits, nuts, beans, and whole grains, and is low in salt. It limits foods that are high in saturated fat, such as meats, cheeses, and fried foods. It may be hard to change your diet, but even small changes can lower your risk of heart attack and heart disease. Follow-up care is a key part of your treatment and safety. Be sure to make and go to all appointments, and call your doctor if you are having problems. It's also a good idea to know your test results and keep a list of the medicines you take. How can you care for yourself at home? Watch your portions  · Learn what a serving is. A \"serving\" and a \"portion\" are not always the same thing. Make sure that you are not eating larger portions than are recommended. For example, a serving of pasta is ½ cup. A serving size of meat is 2 to 3 ounces. A 3-ounce serving is about the size of a deck of cards. Measure serving sizes until you are good at [place]" them. Keep in mind that restaurants often serve portions that are 2 or 3 times the size of one serving. · To keep your energy level up and keep you from feeling hungry, eat often but in smaller portions. · Eat only the number of calories you need to stay at a healthy weight. If you need to lose weight, eat fewer calories than your body burns (through exercise and other physical activity). Eat more fruits and vegetables  · Eat a variety of fruit and vegetables every day. Dark green, deep orange, red, or yellow fruits and vegetables are especially good for you. Examples include spinach, carrots, peaches, and berries. · Keep carrots, celery, and other veggies handy for snacks. Buy fruit that is in season and store it where you can see it so that you will be tempted to eat it. · Cook dishes that have a lot of veggies in them, such as stir-fries and soups. Limit saturated and trans fat  · Read food labels, and try to avoid saturated and trans fats. They increase your risk of heart disease. · Use olive or canola oil when you cook. · Bake, broil, grill, or steam foods instead of frying them. · Choose lean meats instead of high-fat meats such as hot dogs and sausages. Cut off all visible fat when you prepare meat. · Eat fish, skinless poultry, and meat alternatives such as soy products instead of high-fat meats. Soy products, such as tofu, may be especially good for your heart. · Choose low-fat or fat-free milk and dairy products. Eat foods high in fiber  · Eat a variety of grain products every day. Include whole-grain foods that have lots of fiber and nutrients. Examples of whole-grain foods include oats, whole wheat bread, and brown rice. · Buy whole-grain breads and cereals, instead of white bread or pastries. Limit salt and sodium  · Limit how much salt and sodium you eat to help lower your blood pressure. · Taste food before you salt it. Add only a little salt when you think you need it. With time, your taste buds will adjust to less salt. · Eat fewer snack items, fast foods, and other high-salt, processed foods. Check food labels for the amount of sodium in packaged foods. · Choose low-sodium versions of canned goods (such as soups, vegetables, and beans). Limit sugar  · Limit drinks and foods with added sugar. These include candy, desserts, and soda pop. Limit alcohol  · Limit alcohol to no more than 2 drinks a day for men and 1 drink a day for women. Too much alcohol can cause health problems. When should you call for help? Watch closely for changes in your health, and be sure to contact your doctor if:    · You would like help planning heart-healthy meals. Where can you learn more? Go to https://chpepiceweb.[employer] ([employer] org and sign in to your paymio account. Enter V137 in the Equivalent DATA box to learn more about \"Heart-Healthy Diet: Care Instructions. \"     If you do not have an account, please click on the \"Sign Up Now\" link. Current as of: August 22, 2019               Content Version: 12.6  © 7556-3476 [employer]. Care instructions adapted under license by [facility] ([facility]). If you have questions about a medical condition or this instruction, always ask your healthcare professional. [place] [address] any warranty or liability for your use of this information.

## 2021-02-10 ENCOUNTER — IMMUNIZATION (OUTPATIENT)
Dept: PRIMARY CARE CLINIC | Age: 86
End: 2021-02-10
Payer: MEDICARE

## 2021-02-10 PROCEDURE — 91300 COVID-19, PFIZER VACCINE 30MCG/0.3ML DOSE: CPT | Performed by: FAMILY MEDICINE

## 2021-02-10 PROCEDURE — 0002A COVID-19, PFIZER VACCINE 30MCG/0.3ML DOSE: CPT | Performed by: FAMILY MEDICINE

## 2021-02-24 ENCOUNTER — ANTI-COAG VISIT (OUTPATIENT)
Dept: PHARMACY | Age: 86
End: 2021-02-24
Payer: MEDICARE

## 2021-02-24 DIAGNOSIS — Z79.01 ANTICOAGULATED ON COUMADIN: ICD-10-CM

## 2021-02-24 DIAGNOSIS — Z95.2 HX OF ARTIFICIAL HEART VALVE REPLACEMENT: ICD-10-CM

## 2021-02-24 LAB — INR BLD: 2.3

## 2021-02-24 PROCEDURE — 99211 OFF/OP EST MAY X REQ PHY/QHP: CPT

## 2021-02-24 PROCEDURE — 85610 PROTHROMBIN TIME: CPT

## 2021-02-24 NOTE — PROGRESS NOTES
Patient appears well. Patient denies any change in medication. Patient states that he has eaten an extra mixed green salad the last 2-3 days before today. This increase in mixed green salad likely contributed to the slightly below goal INR today. Patient instructed to take 10 mg (1 tablet) warfarin today  then continue current warfarin regimen based on INR of 2.4. Follow-up scheduled 4 weeks. Description    TAKE warfarin 10 mg (1 tablet) today ONLY  Then CONTINUE:Take Warfarin 10 mg daily except 5 mg on Monday, Wednesday and Friday    Call [phone] with signs or symptoms of bleeding or ANY medication changes (including over-the-counter medications or herbal supplements). If significant bleeding occurs please seek immediate medical attention. Keep the number of servings of vitamin K containing foods (dark green, leafy vegetables) the same each week. Please call if this changes. Salad every other day and a green vegetable most days of the week. He does not eat dark green leafy vegetables. Limit alcohol intake. He drinks a glass of wine most evenings. Please call if this changes. Immunization History   Administered Date(s) Administered    COVID-19, Oz Olvera, 30mcg/0.3ml Dose 01/20/2021, 02/10/2021    Influenza, High Dose (Fluzone 65 yrs and older) 09/25/2020    Zoster Recombinant (Shingrix) 09/22/2020     Reviewed AVS with patient / caregiver.       CLINICAL PHARMACY CONSULT: MED RECONCILIATION/REVIEW ADDENDUM    For Pharmacy Admin Tracking Only    PHSO (orange banner): No  Total # of Interventions Recommended (warfarin changes): 1  (warfarin changes) - Increased Dose #: 1  (#1 for reviewing INR) - Maintenance Safety Lab Monitoring #: 1  Total Interventions Accepted (warfarin related): 1  Time Spent (min) (round up): 15

## 2021-03-31 ENCOUNTER — ANTI-COAG VISIT (OUTPATIENT)
Dept: PHARMACY | Age: 86
End: 2021-03-31
Payer: MEDICARE

## 2021-03-31 DIAGNOSIS — Z95.2 HX OF ARTIFICIAL HEART VALVE REPLACEMENT: ICD-10-CM

## 2021-03-31 DIAGNOSIS — Z79.01 ANTICOAGULATED ON COUMADIN: ICD-10-CM

## 2021-03-31 LAB — INR BLD: 2.4

## 2021-03-31 PROCEDURE — 99211 OFF/OP EST MAY X REQ PHY/QHP: CPT

## 2021-03-31 PROCEDURE — 85610 PROTHROMBIN TIME: CPT

## 2021-03-31 NOTE — PROGRESS NOTES
Mr. Cortney Santiago is a 80 y.o.  male. Mr. Cortney Santiago had an INR test today. Results were reviewed and appropriate warfarin management was completed. THIS VISIT WAS COMPLETED AS:   []    A VIRTUAL VISIT VIA TELEPHONE IN EFFORTS TO REDUCE THE SPREAD OF COVID-19.  []    A DRIVE-THRU VISIT IN EFFORTS TO REDUCE THE SPREAD OF COVID-19. [x]    AN IN PERSON VISIT. PROTOCOLS WERE FOLLOWED WITH PRECAUTIONS TO REDUCE THE SPREAD OF COVID-19. Patient verifies current dosing regimen: Yes     Warfarin medication reviewed and updated on the patient 's home medication list: Yes   All other medications reviewed and updated on the patient 's home medication list: Yes     Lab Results   Component Value Date    INR 2.40 2021    INR 2.30 2021    INR 2.70 2021       Patient Findings     Negatives:  Signs/symptoms of bleeding, Missed doses, Change in medications, Change in diet/appetite          Anticoagulation Summary  As of 3/31/2021    INR goal:  2.5-3.5   TTR:  40.5 % (2.7 mo)   INR used for dosin.40 (3/31/2021)   Warfarin maintenance plan:  5 mg (10 mg x 0.5) every Mon, Fri; 10 mg (10 mg x 1) all other days   Weekly warfarin total:  60 mg   Plan last modified:  Malou He (3/31/2021)   Next INR check:  2021   Target end date: Indefinite    Indications    Anticoagulated on Coumadin [Z79.01]  Hx of artificial heart valve replacement [Z95.2]             Anticoagulation Episode Summary     INR check location:      Preferred lab:      Send INR reminders to:  [facility] STAFF    Comments:  [facility]      Anticoagulation Care Providers     Provider Role Specialty Phone number    Carlos Manuel Musa DO Referring Family Medicine [phone]          Warfarin assessment / plan:   Patient appears well, no complaints today. INR low at 2.4.  He has consistently been on the low end, so will increase by 9% and recheck in 2 weeks     Description    NEW DOSE:Take Warfarin 10 mg daily

## 2021-04-07 ENCOUNTER — OFFICE VISIT (OUTPATIENT)
Dept: FAMILY MEDICINE CLINIC | Age: 86
End: 2021-04-07
Payer: MEDICARE

## 2021-04-07 VITALS
DIASTOLIC BLOOD PRESSURE: 80 MMHG | HEIGHT: 70 IN | SYSTOLIC BLOOD PRESSURE: 120 MMHG | WEIGHT: 177 LBS | BODY MASS INDEX: 25.34 KG/M2

## 2021-04-07 DIAGNOSIS — Z95.2 HX OF ARTIFICIAL HEART VALVE REPLACEMENT: ICD-10-CM

## 2021-04-07 DIAGNOSIS — G20 PARKINSON DISEASE (HCC): Primary | ICD-10-CM

## 2021-04-07 DIAGNOSIS — Z79.01 ANTICOAGULATED ON COUMADIN: ICD-10-CM

## 2021-04-07 PROCEDURE — 99214 OFFICE O/P EST MOD 30 MIN: CPT | Performed by: FAMILY MEDICINE

## 2021-04-07 RX ORDER — WARFARIN SODIUM 10 MG/1
5-10 TABLET ORAL SEE ADMIN INSTRUCTIONS
Qty: 90 TABLET | Refills: 1 | Status: SHIPPED | OUTPATIENT
Start: 2021-04-07 | End: 2021-04-12 | Stop reason: SDUPTHER

## 2021-04-07 ASSESSMENT — ENCOUNTER SYMPTOMS
RHINORRHEA: 0
ABDOMINAL PAIN: 0
COUGH: 0
SINUS PRESSURE: 0
NAUSEA: 0
SORE THROAT: 0
VOMITING: 0
DIARRHEA: 0
SHORTNESS OF BREATH: 0

## 2021-04-07 ASSESSMENT — PATIENT HEALTH QUESTIONNAIRE - PHQ9
1. LITTLE INTEREST OR PLEASURE IN DOING THINGS: 0
SUM OF ALL RESPONSES TO PHQ9 QUESTIONS 1 & 2: 0

## 2021-04-07 NOTE — PROGRESS NOTES
2021     Oz Richmond (:  1934) is a 80 y.o. male, here for evaluation of the following medical concerns:     HPI     Oz Richmond (:  1934) has requested an audio/video evaluation for the following concern(s):     1.  Aortic valve- caumadin 10 mg  four times a week, 5 mg three times a week, last INR was wnl, he is wanting it checked today, recently saw his new  Cardiologist, feels well and doesn't have a concern today. He is being managed by the Coumadin clinic.      2.  Parkinsons disease- tremors at rest, ambulation is wnl, follows with Neurology at [facility], feels it is well controlled on the medication, denied a side effect from the medication, takes it as prescribed. \"1. 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 He has mild wearing off with return of tremor. Impaired sleep maintenance likely represents sub-optimal dopamine coverage overnight. \"       Today, denied chest pain, sob, n, v, or diarrhea. Review of Systems   Constitutional: Positive for fatigue. Negative for activity change, fever and unexpected weight change. HENT: Negative for congestion, ear pain, rhinorrhea, sinus pressure and sore throat. Respiratory: Negative for cough and shortness of breath. Cardiovascular: Negative for chest pain, palpitations and leg swelling. Gastrointestinal: Negative for abdominal pain, diarrhea, nausea and vomiting. Endocrine: Negative for cold intolerance, heat intolerance, polydipsia and polyphagia. Musculoskeletal: Positive for arthralgias. Skin: Negative for rash. Neurological: Positive for tremors and weakness. Negative for dizziness, syncope, light-headedness and headaches. Psychiatric/Behavioral: Negative for dysphoric mood. The patient is not nervous/anxious.         Prior to Visit Medications    Medication Sig Taking? Authorizing Provider   warfarin (COUMADIN) 10 MG tablet Take 0.5-1 tablets by mouth See Admin Instructions 5 mg on MON/FRI and 10 mg ALL OTHER DAYS Yes Tereso Chavez, DO   metoprolol succinate (TOPROL XL) 25 MG extended release tablet Take 1 tablet by mouth nightly Yes Tereso Chavez, DO   carbidopa-levodopa (SINEMET)  MG per tablet Take 2 tablets by mouth 4 times daily Yes Antoinette Turcios, DO   aspirin 81 MG tablet Take 81 mg by mouth daily Yes Historical Provider, MD        Social History     Tobacco Use    Smoking status: Never Smoker    Smokeless tobacco: Never Used   Substance Use Topics    Alcohol use: Yes     Alcohol/week: 1.0 standard drinks     Types: 1 Glasses of wine per week     Comment: wine weekly        Vitals:    04/07/21 0859   BP: 120/80   Weight: 177 lb (80.3 kg)   Height: 5' 10\" (1.778 m)     Estimated body mass index is 25.4 kg/m² as calculated from the following:    Height as of this encounter: 5' 10\" (1.778 m). Weight as of this encounter: 177 lb (80.3 kg). Physical Exam  Vitals signs and nursing note reviewed. Constitutional:       Appearance: He is well-developed. HENT:      Head: Normocephalic and atraumatic. Right Ear: Tympanic membrane, ear canal and external ear normal.      Left Ear: Tympanic membrane, ear canal and external ear normal.      Nose: Nose normal.   Neck:      Thyroid: No thyromegaly. Cardiovascular:      Rate and Rhythm: Normal rate and regular rhythm. Heart sounds: No murmur. Pulmonary:      Effort: Pulmonary effort is normal.      Breath sounds: Normal breath sounds. No wheezing or rales. Abdominal:      General: Bowel sounds are normal.      Palpations: Abdomen is soft. Tenderness: There is no abdominal tenderness. Musculoskeletal: Normal range of motion. Skin:     Findings: No rash. Neurological:      General: No focal deficit present.       Mental Status: He is alert and oriented to person, place, and time. Mental status is at baseline. Psychiatric:         Behavior: Behavior normal.         Judgment: Judgment normal.         ASSESSMENT/PLAN:  1. Anticoagulated on Coumadin    - CBC; Future  - Comprehensive Metabolic Panel; Future  - Lipid Panel; Future    2. Hx of artificial heart valve replacement  Stable  Continue with medication  Keep appointments with specialist.   Answered questions  - CBC; Future  - Comprehensive Metabolic Panel; Future  - Lipid Panel; Future    3. Parkinson disease  Stable  Continue with medication  Keep appointments with specialist.   Nehemias Hammer questions    Return in about 3 months (around 7/7/2021).

## 2021-04-12 PROBLEM — G20.A1 PARKINSON DISEASE: Status: ACTIVE | Noted: 2021-04-12

## 2021-04-12 PROBLEM — G20 PARKINSON DISEASE (HCC): Status: ACTIVE | Noted: 2021-04-12

## 2021-04-12 RX ORDER — WARFARIN SODIUM 10 MG/1
5-10 TABLET ORAL SEE ADMIN INSTRUCTIONS
Qty: 90 TABLET | Refills: 1 | Status: SHIPPED | OUTPATIENT
Start: 2021-04-12 | End: 2021-04-16

## 2021-04-14 ENCOUNTER — NURSE ONLY (OUTPATIENT)
Dept: FAMILY MEDICINE CLINIC | Age: 86
End: 2021-04-14
Payer: MEDICARE

## 2021-04-14 DIAGNOSIS — Z79.01 ANTICOAGULATED ON COUMADIN: ICD-10-CM

## 2021-04-14 DIAGNOSIS — Z95.2 HX OF ARTIFICIAL HEART VALVE REPLACEMENT: ICD-10-CM

## 2021-04-14 LAB
HCT VFR BLD CALC: 47.7 % (ref 40.5–52.5)
HEMOGLOBIN: 15.6 G/DL (ref 13.5–17.5)
MCH RBC QN AUTO: 30.6 PG (ref 26–34)
MCHC RBC AUTO-ENTMCNC: 32.7 G/DL (ref 31–36)
MCV RBC AUTO: 93.6 FL (ref 80–100)
PDW BLD-RTO: 15.6 % (ref 12.4–15.4)
PLATELET # BLD: 241 K/UL (ref 135–450)
PMV BLD AUTO: 8.9 FL (ref 5–10.5)
RBC # BLD: 5.1 M/UL (ref 4.2–5.9)
WBC # BLD: 8.2 K/UL (ref 4–11)

## 2021-04-14 PROCEDURE — 36415 COLL VENOUS BLD VENIPUNCTURE: CPT | Performed by: FAMILY MEDICINE

## 2021-04-15 LAB
A/G RATIO: 2.1 (ref 1.1–2.2)
ALBUMIN SERPL-MCNC: 4.5 G/DL (ref 3.4–5)
ALP BLD-CCNC: 53 U/L (ref 40–129)
ALT SERPL-CCNC: <5 U/L (ref 10–40)
ANION GAP SERPL CALCULATED.3IONS-SCNC: 15 MMOL/L (ref 3–16)
AST SERPL-CCNC: 27 U/L (ref 15–37)
BILIRUB SERPL-MCNC: 0.8 MG/DL (ref 0–1)
BUN BLDV-MCNC: 14 MG/DL (ref 7–20)
CALCIUM SERPL-MCNC: 9.5 MG/DL (ref 8.3–10.6)
CHLORIDE BLD-SCNC: 100 MMOL/L (ref 99–110)
CHOLESTEROL, TOTAL: 164 MG/DL (ref 0–199)
CO2: 25 MMOL/L (ref 21–32)
CREAT SERPL-MCNC: 0.8 MG/DL (ref 0.8–1.3)
GFR AFRICAN AMERICAN: >60
GFR NON-AFRICAN AMERICAN: >60
GLOBULIN: 2.1 G/DL
GLUCOSE BLD-MCNC: 75 MG/DL (ref 70–99)
HDLC SERPL-MCNC: 71 MG/DL (ref 40–60)
LDL CHOLESTEROL CALCULATED: 70 MG/DL
POTASSIUM SERPL-SCNC: 5 MMOL/L (ref 3.5–5.1)
SODIUM BLD-SCNC: 140 MMOL/L (ref 136–145)
TOTAL PROTEIN: 6.6 G/DL (ref 6.4–8.2)
TRIGL SERPL-MCNC: 117 MG/DL (ref 0–150)
VLDLC SERPL CALC-MCNC: 23 MG/DL

## 2021-04-16 ENCOUNTER — ANTI-COAG VISIT (OUTPATIENT)
Dept: PHARMACY | Age: 86
End: 2021-04-16
Payer: MEDICARE

## 2021-04-16 DIAGNOSIS — Z95.2 HX OF ARTIFICIAL HEART VALVE REPLACEMENT: ICD-10-CM

## 2021-04-16 DIAGNOSIS — Z79.01 ANTICOAGULATED ON COUMADIN: ICD-10-CM

## 2021-04-16 LAB — INTERNATIONAL NORMALIZATION RATIO, POC: 3

## 2021-04-16 PROCEDURE — 99211 OFF/OP EST MAY X REQ PHY/QHP: CPT

## 2021-04-16 PROCEDURE — 85610 PROTHROMBIN TIME: CPT

## 2021-04-16 RX ORDER — WARFARIN SODIUM 10 MG/1
10 TABLET ORAL DAILY
COMMUNITY
End: 2021-09-09 | Stop reason: SDUPTHER

## 2021-04-16 NOTE — PROGRESS NOTES
Mr. José Miguel Frey is a 80 y.o.  male. Mr. José Miguel Frey had an INR test today. Results were reviewed and appropriate warfarin management was completed. THIS VISIT WAS COMPLETED AS:   []    A VIRTUAL VISIT VIA TELEPHONE IN EFFORTS TO REDUCE THE SPREAD OF COVID-19.  []    A DRIVE-THRU VISIT IN EFFORTS TO REDUCE THE SPREAD OF COVID-19. [x]    AN IN PERSON VISIT. PROTOCOLS WERE FOLLOWED WITH PRECAUTIONS TO REDUCE THE SPREAD OF COVID-19. Patient verifies current dosing regimen: Yes     Warfarin medication reviewed and updated on the patient 's home medication list: Yes   All other medications reviewed and updated on the patient 's home medication list: No: no changes     Lab Results   Component Value Date    INR 3.0 04/16/2021    INR 2.40 03/31/2021    INR 2.30 02/24/2021       Patient Findings     Negatives:  Signs/symptoms of bleeding, Change in medications, Change in diet/appetite, Bruising          Anticoagulation Summary  As of 4/16/2021    INR goal:  2.5-3.5   TTR:  47.5 % (3.3 mo)   INR used for dosing:  3.0 (4/16/2021)   Warfarin maintenance plan:  5 mg (10 mg x 0.5) every Mon, Fri; 10 mg (10 mg x 1) all other days   Weekly warfarin total:  60 mg   Plan last modified:  Mecca Holden RPH (4/16/2021)   Next INR check:  5/28/2021   Priority:  Maintenance   Target end date: Indefinite    Indications    Anticoagulated on Coumadin [Z79.01]  Hx of artificial heart valve replacement [Z95.2]             Anticoagulation Episode Summary     INR check location:      Preferred lab:      Send INR reminders to:  [facility] STAFF    Comments:  [facility]      Anticoagulation Care Providers     Provider Role Specialty Phone number    Apolonia Arredondo DO Referring Family Medicine [phone]          Warfarin assessment / plan:   Appears well with no complaints and no changes. No change to warfarin therapy today.      He agrees to call with any medication changes especially antibiotics or steroid therapy. Description    CONTINUE:Warfarin 10 mg daily except 5 mg on Monday and Fridays    Call [phone] with signs or symptoms of bleeding or ANY medication changes (including over-the-counter medications or herbal supplements). If significant bleeding occurs please seek immediate medical attention. Keep the number of servings of vitamin K containing foods (dark green, leafy vegetables) the same each week. Please call if this changes. Salad every other day and a green vegetable most days of the week. He does not eat dark green leafy vegetables. Limit alcohol intake. He drinks a glass of wine most evenings. Please call if this changes. Immunization History   Administered Date(s) Administered    COVID-19, Pfizer, PF, 30mcg/0.3mL 01/20/2021, 02/10/2021    Influenza, High Dose (Fluzone 65 yrs and older) 09/25/2020    Zoster Recombinant (Shingrix) 09/22/2020         Reviewed AVS with patient / caregiver.       CLINICAL PHARMACY CONSULT: MED RECONCILIATION/REVIEW ADDENDUM    For Pharmacy Admin Tracking Only    PHSO (orange banner): No  Total # of Interventions Recommended (warfarin changes): 0  (#1 for reviewing INR) - Maintenance Safety Lab Monitoring #: 1  Total Interventions Accepted (warfarin related): 0  Time Spent (min) (round up): 15

## 2021-05-28 ENCOUNTER — ANTI-COAG VISIT (OUTPATIENT)
Dept: PHARMACY | Age: 86
End: 2021-05-28
Payer: MEDICARE

## 2021-05-28 DIAGNOSIS — Z79.01 ANTICOAGULATED ON COUMADIN: Primary | ICD-10-CM

## 2021-05-28 DIAGNOSIS — Z95.2 HX OF ARTIFICIAL HEART VALVE REPLACEMENT: ICD-10-CM

## 2021-05-28 LAB — INTERNATIONAL NORMALIZATION RATIO, POC: 2.6

## 2021-05-28 PROCEDURE — 99211 OFF/OP EST MAY X REQ PHY/QHP: CPT

## 2021-05-28 PROCEDURE — 85610 PROTHROMBIN TIME: CPT

## 2021-05-28 NOTE — PROGRESS NOTES
Mr. Frankie Garcia is a 80 y.o.  male. Mr. Frankie Garcia had an INR test today. Results were reviewed and appropriate warfarin management was completed. THIS VISIT WAS PERFORMED AS: AN IN PERSON VISIT. PROTOCOLS WERE FOLLOWED WITH PRECAUTIONS TO REDUCE THE SPREAD OF COVID-19. Patient verifies current dosing regimen: Yes     Warfarin medication reviewed and updated on the patient 's home medication list: Yes   All other medications reviewed and updated on the patient 's home medication list: No changes reported     Lab Results   Component Value Date    INR 2.6 2021    INR 3.0 2021    INR 2.40 2021       Patient Findings     Negatives:  Signs/symptoms of thrombosis, Signs/symptoms of bleeding, Change in health, Missed doses, Change in medications, Change in diet/appetite, Bruising          Anticoagulation Summary  As of 2021    INR goal:  2.5-3.5   TTR:  63.2 % (4.7 mo)   INR used for dosin.6 (2021)   Warfarin maintenance plan:  5 mg (10 mg x 0.5) every Mon, Fri; 10 mg (10 mg x 1) all other days   Weekly warfarin total:  60 mg   Plan last modified:  Mecca Holden RPH (2021)   Next INR check:  2021   Priority:  Maintenance   Target end date: Indefinite    Indications    Anticoagulated on Coumadin [Z79.01]  Hx of artificial heart valve replacement [Z95.2]             Anticoagulation Episode Summary     INR check location:      Preferred lab:      Send INR reminders to:  [facility]GEMENT [facility]INICAL STAFF    Comments:  [facility]      Anticoagulation Care Providers     Provider Role Specialty Phone number    Dede Norman DO Referring Family Medicine [phone]          Warfarin assessment / plan:   Appears well with no complaints and no changes. No change to warfarin therapy today.        Description    CONTINUE:Warfarin 10 mg daily except 5 mg on Monday and     Call [phone] with signs or symptoms of bleeding or ANY medication changes (including over-the-counter medications or herbal supplements). If significant bleeding occurs please seek immediate medical attention. Keep the number of servings of vitamin K containing foods (dark green, leafy vegetables) the same each week. Please call if this changes. Salad every other day and a green vegetable most days of the week. He does not eat dark green leafy vegetables. Limit alcohol intake. He drinks a glass of wine most evenings. Please call if this changes. Immunization History   Administered Date(s) Administered    COVID-19, Pfizer, PF, 30mcg/0.3mL 01/20/2021, 02/10/2021    Influenza, High Dose (Fluzone 65 yrs and older) 09/25/2020    Zoster Recombinant (Shingrix) 09/22/2020         Reviewed AVS with patient / caregiver.       CLINICAL PHARMACY CONSULT: MED RECONCILIATION/REVIEW ADDENDUM    For Pharmacy Admin Tracking Only     Total # of Interventions Recommended: 0   Total # of Interventions Accepted: 0   Time Spent (min): 15

## 2021-06-02 ENCOUNTER — OFFICE VISIT (OUTPATIENT)
Dept: FAMILY MEDICINE CLINIC | Age: 86
End: 2021-06-02
Payer: MEDICARE

## 2021-06-02 VITALS
HEART RATE: 64 BPM | HEIGHT: 70 IN | WEIGHT: 176.6 LBS | DIASTOLIC BLOOD PRESSURE: 85 MMHG | BODY MASS INDEX: 25.28 KG/M2 | SYSTOLIC BLOOD PRESSURE: 135 MMHG

## 2021-06-02 DIAGNOSIS — Z00.00 ROUTINE GENERAL MEDICAL EXAMINATION AT A HEALTH CARE FACILITY: Primary | ICD-10-CM

## 2021-06-02 PROCEDURE — G0439 PPPS, SUBSEQ VISIT: HCPCS | Performed by: FAMILY MEDICINE

## 2021-06-02 ASSESSMENT — LIFESTYLE VARIABLES
HOW OFTEN DURING THE LAST YEAR HAVE YOU FAILED TO DO WHAT WAS NORMALLY EXPECTED FROM YOU BECAUSE OF DRINKING: 0
HOW OFTEN DURING THE LAST YEAR HAVE YOU NEEDED AN ALCOHOLIC DRINK FIRST THING IN THE MORNING TO GET YOURSELF GOING AFTER A NIGHT OF HEAVY DRINKING: 0
HOW MANY STANDARD DRINKS CONTAINING ALCOHOL DO YOU HAVE ON A TYPICAL DAY: 0
HAS A RELATIVE, FRIEND, DOCTOR, OR ANOTHER HEALTH PROFESSIONAL EXPRESSED CONCERN ABOUT YOUR DRINKING OR SUGGESTED YOU CUT DOWN: 0
HOW OFTEN DURING THE LAST YEAR HAVE YOU BEEN UNABLE TO REMEMBER WHAT HAPPENED THE NIGHT BEFORE BECAUSE YOU HAD BEEN DRINKING: 0
HOW OFTEN DO YOU HAVE A DRINK CONTAINING ALCOHOL: 4
HOW OFTEN DO YOU HAVE SIX OR MORE DRINKS ON ONE OCCASION: 0
HOW OFTEN DURING THE LAST YEAR HAVE YOU FOUND THAT YOU WERE NOT ABLE TO STOP DRINKING ONCE YOU HAD STARTED: 0
AUDIT TOTAL SCORE: 4
AUDIT-C TOTAL SCORE: 4
HOW OFTEN DURING THE LAST YEAR HAVE YOU HAD A FEELING OF GUILT OR REMORSE AFTER DRINKING: 0
HAVE YOU OR SOMEONE ELSE BEEN INJURED AS A RESULT OF YOUR DRINKING: 0

## 2021-06-02 ASSESSMENT — PATIENT HEALTH QUESTIONNAIRE - PHQ9
SUM OF ALL RESPONSES TO PHQ QUESTIONS 1-9: 0
1. LITTLE INTEREST OR PLEASURE IN DOING THINGS: 0
2. FEELING DOWN, DEPRESSED OR HOPELESS: 0
SUM OF ALL RESPONSES TO PHQ9 QUESTIONS 1 & 2: 0
SUM OF ALL RESPONSES TO PHQ QUESTIONS 1-9: 0
SUM OF ALL RESPONSES TO PHQ QUESTIONS 1-9: 0

## 2021-06-02 NOTE — PATIENT INSTRUCTIONS
Personalized Preventive Plan for Clinton Jenniferstad - 6/2/2021  Medicare offers a range of preventive health benefits. Some of the tests and screenings are paid in full while other may be subject to a deductible, co-insurance, and/or copay. Some of these benefits include a comprehensive review of your medical history including lifestyle, illnesses that may run in your family, and various assessments and screenings as appropriate. After reviewing your medical record and screening and assessments performed today your provider may have ordered immunizations, labs, imaging, and/or referrals for you. A list of these orders (if applicable) as well as your Preventive Care list are included within your After Visit Summary for your review. Other Preventive Recommendations:    · A preventive eye exam performed by an eye specialist is recommended every 1-2 years to screen for glaucoma; cataracts, macular degeneration, and other eye disorders. · A preventive dental visit is recommended every 6 months. · Try to get at least 150 minutes of exercise per week or 10,000 steps per day on a pedometer . · Order or download the FREE \"Exercise & Physical Activity: Your Everyday Guide\" from The [employer]. Call 6-866.474.9486 or search The [employer] online. · You need 6295-6406 mg of calcium and 2991-3620 IU of vitamin D per day. It is possible to meet your calcium requirement with diet alone, but a vitamin D supplement is usually necessary to meet this goal.  · When exposed to the sun, use a sunscreen that protects against both UVA and UVB radiation with an SPF of 30 or greater. Reapply every 2 to 3 hours or after sweating, drying off with a towel, or swimming. · Always wear a seat belt when traveling in a car. Always wear a helmet when riding a bicycle or motorcycle. Heart-Healthy Diet   Sodium, Fat, and Cholesterol Controlled Diet       What Is a Heart Healthy Diet?    A heart-healthy diet is one that limits sodium , certain types of fat , and cholesterol . This type of diet is recommended for:   People with any form of cardiovascular disease (eg, coronary heart disease , peripheral vascular disease , previous heart attack , previous stroke )   People with risk factors for cardiovascular disease, such as high blood pressure , high cholesterol , or diabetes   Anyone who wants to lower their risk of developing cardiovascular disease   Sodium    Sodium is a mineral found in many foods. In general, most people consume much more sodium than they need. Diets high in sodium can increase blood pressure and lead to edema (water retention). On a heart-healthy diet, you should consume no more than 2,300 mg (milligrams) of sodium per dayabout the amount in one teaspoon of table salt. The foods highest in sodium include table salt (about 50% sodium), processed foods, convenience foods, and preserved foods. Cholesterol    Cholesterol is a fat-like, waxy substance in your blood. Our bodies make some cholesterol. It is also found in animal products, with the highest amounts in fatty meat, egg yolks, whole milk, cheese, shellfish, and organ meats. On a heart-healthy diet, you should limit your cholesterol intake to less than 200 mg per day. It is normal and important to have some cholesterol in your bloodstream. But too much cholesterol can cause plaque to build up within your arteries, which can eventually lead to a heart attack or stroke. The two types of cholesterol that are most commonly referred to are:   Low-density lipoprotein (LDL) cholesterol  Also known as bad cholesterol, this is the cholesterol that tends to build up along your arteries. Bad cholesterol levels are increased by eating fats that are saturated or hydrogenated. Optimal level of this cholesterol is less than 100. Over 130 starts to get risky for heart disease.    High-density lipoprotein (HDL) cholesterol  Also known as good cholesterol, this type of cholesterol actually carries cholesterol away from your arteries and may, therefore, help lower your risk of having a heart attack. You want this level to be high (ideally greater than 60). It is a risk to have a level less than 40. You can raise this good cholesterol by eating olive oil, canola oil, avocados, or nuts. Exercise raises this level, too. Fat    Fat is calorie dense and packs a lot of calories into a small amount of food. Even though fats should be limited due to their high calorie content, not all fats are bad. In fact, some fats are quite healthful. Fat can be broken down into four main types. The good-for-you fats are:   Monounsaturated fat  found in oils such as olive and canola, avocados, and nuts and natural nut butters; can decrease cholesterol levels, while keeping levels of HDL cholesterol high   Polyunsaturated fat  found in oils such as safflower, sunflower, soybean, corn, and sesame; can decrease total cholesterol and LDL cholesterol   Omega-3 fatty acids  particularly those found in fatty fish (such as salmon, trout, tuna, mackerel, herring, and sardines); can decrease risk of arrhythmias, decrease triglyceride levels, and slightly lower blood pressure   The fats that you want to limit are:   Saturated fat  found in animal products, many fast foods, and a few vegetables; increases total blood cholesterol, including LDL levels   Animal fats that are saturated include: butter, lard, whole-milk dairy products, meat fat, and poultry skin   Vegetable fats that are saturated include: hydrogenated shortening, palm oil, coconut oil, cocoa butter   Hydrogenated or trans fat  found in margarine and vegetable shortening, most shelf stable snack foods, and fried foods; increases LDL and decreases HDL     It is generally recommended that you limit your total fat for the day to less than 30% of your total calories.  If you follow an 1800-calorie heart healthy diet, for example, this would mean 60 grams of fat or less per day. Saturated fat and trans fat in your diet raises your blood cholesterol the most, much more than dietary cholesterol does. For this reason, on a heart-healthy diet, less than 7% of your calories should come from saturated fat and ideally 0% from trans fat. On an 1800-calorie diet, this translates into less than 14 grams of saturated fat per day, leaving 46 grams of fat to come from mono- and polyunsaturated fats.    Food Choices on a Heart Healthy Diet   Food Category   Foods Recommended   Foods to Avoid   Grains   Breads and rolls without salted tops Most dry and cooked cereals Unsalted crackers and breadsticks Low-sodium or homemade breadcrumbs or stuffing All rice and pastas   Breads, rolls, and crackers with salted tops High-fat baked goods (eg, muffins, donuts, pastries) Quick breads, self-rising flour, and biscuit mixes Regular bread crumbs Instant hot cereals Commercially prepared rice, pasta, or stuffing mixes   Vegetables   Most fresh, frozen, and low-sodium canned vegetables Low-sodium and salt-free vegetable juices Canned vegetables if unsalted or rinsed   Regular canned vegetables and juices, including sauerkraut and pickled vegetables Frozen vegetables with sauces Commercially prepared potato and vegetable mixes   Fruits   Most fresh, frozen, and canned fruits All fruit juices   Fruits processed with salt or sodium   Milk   Nonfat or low-fat (1%) milk Nonfat or low-fat yogurt Cottage cheese, low-fat ricotta, cheeses labeled as low-fat and low-sodium   Whole milk Reduced-fat (2%) milk Malted and chocolate milk Full fat yogurt Most cheeses (unless low-fat and low salt) Buttermilk (no more than 1 cup per week)   Meats and Beans   Lean cuts of fresh or frozen beef, veal, lamb, or pork (look for the word loin) Fresh or frozen poultry without the skin Fresh or frozen fish and some shellfish Egg whites and egg substitutes (Limit whole eggs to three per week) Tofu Nuts or seeds (unsalted, dry-roasted), low-sodium peanut butter Dried peas, beans, and lentils   Any smoked, cured, salted, or canned meat, fish, or poultry (including phillips, chipped beef, cold cuts, hot dogs, sausages, sardines, and anchovies) Poultry skins Breaded and/or fried fish or meats Canned peas, beans, and lentils Salted nuts   Fats and Oils   Olive oil and canola oil Low-sodium, low-fat salad dressings and mayonnaise   Butter, margarine, coconut and palm oils, phillips fat   Snacks, Sweets, and Condiments   Low-sodium or unsalted versions of broths, soups, soy sauce, and condiments Pepper, herbs, and spices; vinegar, lemon, or lime juice Low-fat frozen desserts (yogurt, sherbet, fruit bars) Sugar, cocoa powder, honey, syrup, jam, and preserves Low-fat, trans-fat free cookies, cakes, and pies Bola and animal crackers, fig bars, dulce snaps   High-fat desserts Broth, soups, gravies, and sauces, made from instant mixes or other high-sodium ingredients Salted snack foods Canned olives Meat tenderizers, seasoning salt, and most flavored vinegars   Beverages   Low-sodium carbonated beverages Tea and coffee in moderation Soy milk   Commercially softened water   Suggestions   Make whole grains, fruits, and vegetables the base of your diet. Choose heart-healthy fats such as canola, olive, and flaxseed oil, and foods high in heart-healthy fats, such as nuts, seeds, soybeans, tofu, and fish. Eat fish at least twice per week; the fish highest in omega-3 fatty acids and lowest in mercury include salmon, herring, mackerel, sardines, and canned chunk light tuna. If you eat fish less than twice per week or have high triglycerides, talk to your doctor about taking fish oil supplements. Read food labels.    For products low in fat and cholesterol, look for fat free, low-fat, cholesterol free, saturated fat free, and trans fat freeAlso scan the Nutrition Facts Label, which lists saturated fat, trans fat, and cholesterol amounts. For products low in sodium, look for sodium free, very low sodium, low sodium, no added salt, and unsalted   Skip the salt when cooking or at the table; if food needs more flavor, get creative and try out different herbs and spices. Garlic and onion also add substantial flavor to foods. Trim any visible fat off meat and poultry before cooking, and drain the fat off after solano. Use cooking methods that require little or no added fat, such as grilling, boiling, baking, poaching, broiling, roasting, steaming, stir-frying, and sauting. Avoid fast food and convenience food. They tend to be high in saturated and trans fat and have a lot of added salt. Talk to a registered dietitian for individualized diet advice. Last Reviewed: March 2011 Blake Foy MS, MPH, RD   Updated: 3/29/2011   ·     Keep Your Memory Jackelyn Pavan       Many factors can affect your ability to remembera hectic lifestyle, aging, stress, chronic disease, and certain medicines. But, there are steps you can take to sharpen your mind and help preserve your memory. Challenge Your Brain   Regularly challenging your mind may help keeps it in top shape. Good mental exercises include:   Crossword puzzlesUse a dictionary if you need it; you will learn more that way. Brainteasers Try some! Crafts, such as wood working and sewing   Hobbies, such as gardening and building model airplanes   SocializingVisit old friends or join groups to meet new ones. Reading   Learning a new language   Taking a class, whether it be art history or ceasar chi   TravelingExperience the food, history, and culture of your destination   Learning to use a computer   Going to museums, the theater, or thought-provoking movies   Changing things in your daily life, such as reversing your pattern in the grocery store or brushing your teeth using your nondominant hand   Use Memory Aids   There is no need to remember every detail on your own.  These memory aids can help:   Calendars and day planners   Electronic organizers to store all sorts of helpful informationThese devices can \"beep\" to remind you of appointments. A book of days to record birthdays, anniversaries, and other occasions that occur on the same date every year   Detailed \"to-do\" lists and strategically placed sticky notes   Quick \"study\" sessionsBefore a gathering, review who will be there so their names will be fresh in your mind. Establish routinesFor example, keep your keys, wallet, and umbrella in the same place all the time or take medicine with your 8:00 AM glass of juice   Live a Healthy Life   Many actions that will keep your body strong will do the same for your mind. For example:   Talk to Your Doctor About Herbs and Supplements    Malnutrition and vitamin deficiencies can impair your mental function. For example, vitamin B12 deficiency can cause a range of symptoms, including confusion. But, what if your nutritional needs are being met? Can herbs and supplements still offer a benefit? Researchers have investigated a range of natural remedies, such as ginkgo , ginseng , and the supplement phosphatidylserine (PS). So far, though, the evidence is inconsistent as to whether these products can improve memory or thinking. If you are interested in taking herbs and supplements, talk to your doctor first because they may interact with other medicines that you are taking. Exercise Regularly    Among the many benefits of regular exercise are increased blood flow to the brain and decreased risk of certain diseases that can interfere with memory function. One study found that even moderate exercise has a beneficial effect. Examples of \"moderate\" exercise include:   Playing 18 holes of golf once a week, without a cart   Playing tennis twice a week   Walking one mile per day   Manage Stress    It can be tough to remember what is important when your mind is cluttered.  Make time for relaxation. Choose activities that calm you down, and make it routine. Manage Chronic Conditions    Side effects of high blood pressure , diabetes, and heart disease can interfere with mental function. Many of the lifestyle steps discussed here can help manage these conditions. Strive to eat a healthy diet, exercise regularly, get stress under control, and follow your doctor's advice for your condition. Minimize Medications    Talk to your doctor about the medicines that you take. Some may be unnecessary. Also, healthy lifestyle habits may lower the need for certain drugs. Last Reviewed: April 2010 Marsha Choi MD   Updated: 4/13/2010   ·   Personalized Preventive Plan for [place] Jenniferstad - 6/2/2021  Medicare offers a range of preventive health benefits. Some of the tests and screenings are paid in full while other may be subject to a deductible, co-insurance, and/or copay. Some of these benefits include a comprehensive review of your medical history including lifestyle, illnesses that may run in your family, and various assessments and screenings as appropriate. After reviewing your medical record and screening and assessments performed today your provider may have ordered immunizations, labs, imaging, and/or referrals for you. A list of these orders (if applicable) as well as your Preventive Care list are included within your After Visit Summary for your review. Other Preventive Recommendations:    A preventive eye exam performed by an eye specialist is recommended every 1-2 years to screen for glaucoma; cataracts, macular degeneration, and other eye disorders. A preventive dental visit is recommended every 6 months. Try to get at least 150 minutes of exercise per week or 10,000 steps per day on a pedometer . Order or download the FREE \"Exercise & Physical Activity: Your Everyday Guide\" from The [employer]. Call 6-452.222.7072 or search The [employer] online. You need 4915-4518 mg of calcium and 8919-8663 IU of vitamin D per day. It is possible to meet your calcium requirement with diet alone, but a vitamin D supplement is usually necessary to meet this goal.  When exposed to the sun, use a sunscreen that protects against both UVA and UVB radiation with an SPF of 30 or greater. Reapply every 2 to 3 hours or after sweating, drying off with a towel, or swimming. Always wear a seat belt when traveling in a car. Always wear a helmet when riding a bicycle or motorcycle. Heart-Healthy Diet   Sodium, Fat, and Cholesterol Controlled Diet       What Is a Heart Healthy Diet? A heart-healthy diet is one that limits sodium , certain types of fat , and cholesterol . This type of diet is recommended for:   People with any form of cardiovascular disease (eg, coronary heart disease , peripheral vascular disease , previous heart attack , previous stroke )   People with risk factors for cardiovascular disease, such as high blood pressure , high cholesterol , or diabetes   Anyone who wants to lower their risk of developing cardiovascular disease   Sodium    Sodium is a mineral found in many foods. In general, most people consume much more sodium than they need. Diets high in sodium can increase blood pressure and lead to edema (water retention). On a heart-healthy diet, you should consume no more than 2,300 mg (milligrams) of sodium per dayabout the amount in one teaspoon of table salt. The foods highest in sodium include table salt (about 50% sodium), processed foods, convenience foods, and preserved foods. Cholesterol    Cholesterol is a fat-like, waxy substance in your blood. Our bodies make some cholesterol. It is also found in animal products, with the highest amounts in fatty meat, egg yolks, whole milk, cheese, shellfish, and organ meats. On a heart-healthy diet, you should limit your cholesterol intake to less than 200 mg per day.    It is normal and important to have some fast foods, and a few vegetables; increases total blood cholesterol, including LDL levels   Animal fats that are saturated include: butter, lard, whole-milk dairy products, meat fat, and poultry skin   Vegetable fats that are saturated include: hydrogenated shortening, palm oil, coconut oil, cocoa butter   Hydrogenated or trans fat  found in margarine and vegetable shortening, most shelf stable snack foods, and fried foods; increases LDL and decreases HDL     It is generally recommended that you limit your total fat for the day to less than 30% of your total calories. If you follow an 1800-calorie heart healthy diet, for example, this would mean 60 grams of fat or less per day. Saturated fat and trans fat in your diet raises your blood cholesterol the most, much more than dietary cholesterol does. For this reason, on a heart-healthy diet, less than 7% of your calories should come from saturated fat and ideally 0% from trans fat. On an 1800-calorie diet, this translates into less than 14 grams of saturated fat per day, leaving 46 grams of fat to come from mono- and polyunsaturated fats.    Food Choices on a Heart Healthy Diet   Food Category   Foods Recommended   Foods to Avoid   Grains   Breads and rolls without salted tops Most dry and cooked cereals Unsalted crackers and breadsticks Low-sodium or homemade breadcrumbs or stuffing All rice and pastas   Breads, rolls, and crackers with salted tops High-fat baked goods (eg, muffins, donuts, pastries) Quick breads, self-rising flour, and biscuit mixes Regular bread crumbs Instant hot cereals Commercially prepared rice, pasta, or stuffing mixes   Vegetables   Most fresh, frozen, and low-sodium canned vegetables Low-sodium and salt-free vegetable juices Canned vegetables if unsalted or rinsed   Regular canned vegetables and juices, including sauerkraut and pickled vegetables Frozen vegetables with sauces Commercially prepared potato and vegetable mixes   Fruits   Most fresh, frozen, and canned fruits All fruit juices   Fruits processed with salt or sodium   Milk   Nonfat or low-fat (1%) milk Nonfat or low-fat yogurt Cottage cheese, low-fat ricotta, cheeses labeled as low-fat and low-sodium   Whole milk Reduced-fat (2%) milk Malted and chocolate milk Full fat yogurt Most cheeses (unless low-fat and low salt) Buttermilk (no more than 1 cup per week)   Meats and Beans   Lean cuts of fresh or frozen beef, veal, lamb, or pork (look for the word loin) Fresh or frozen poultry without the skin Fresh or frozen fish and some shellfish Egg whites and egg substitutes (Limit whole eggs to three per week) Tofu Nuts or seeds (unsalted, dry-roasted), low-sodium peanut butter Dried peas, beans, and lentils   Any smoked, cured, salted, or canned meat, fish, or poultry (including phillips, chipped beef, cold cuts, hot dogs, sausages, sardines, and anchovies) Poultry skins Breaded and/or fried fish or meats Canned peas, beans, and lentils Salted nuts   Fats and Oils   Olive oil and canola oil Low-sodium, low-fat salad dressings and mayonnaise   Butter, margarine, coconut and palm oils, phillips fat   Snacks, Sweets, and Condiments   Low-sodium or unsalted versions of broths, soups, soy sauce, and condiments Pepper, herbs, and spices; vinegar, lemon, or lime juice Low-fat frozen desserts (yogurt, sherbet, fruit bars) Sugar, cocoa powder, honey, syrup, jam, and preserves Low-fat, trans-fat free cookies, cakes, and pies Bola and animal crackers, fig bars, dulce snaps   High-fat desserts Broth, soups, gravies, and sauces, made from instant mixes or other high-sodium ingredients Salted snack foods Canned olives Meat tenderizers, seasoning salt, and most flavored vinegars   Beverages   Low-sodium carbonated beverages Tea and coffee in moderation Soy milk   Commercially softened water   Suggestions   Make whole grains, fruits, and vegetables the base of your diet.     Choose heart-healthy fats such as canola, olive, and flaxseed oil, and foods high in heart-healthy fats, such as nuts, seeds, soybeans, tofu, and fish. Eat fish at least twice per week; the fish highest in omega-3 fatty acids and lowest in mercury include salmon, herring, mackerel, sardines, and canned chunk light tuna. If you eat fish less than twice per week or have high triglycerides, talk to your doctor about taking fish oil supplements. Read food labels. For products low in fat and cholesterol, look for fat free, low-fat, cholesterol free, saturated fat free, and trans fat freeAlso scan the Nutrition Facts Label, which lists saturated fat, trans fat, and cholesterol amounts. For products low in sodium, look for sodium free, very low sodium, low sodium, no added salt, and unsalted   Skip the salt when cooking or at the table; if food needs more flavor, get creative and try out different herbs and spices. Garlic and onion also add substantial flavor to foods. Trim any visible fat off meat and poultry before cooking, and drain the fat off after solano. Use cooking methods that require little or no added fat, such as grilling, boiling, baking, poaching, broiling, roasting, steaming, stir-frying, and sauting. Avoid fast food and convenience food. They tend to be high in saturated and trans fat and have a lot of added salt. Talk to a registered dietitian for individualized diet advice. Last Reviewed: March 2011 Blake Foy MS, MPH, RD   Updated: 3/29/2011       Keep Your Memory Jackelyn Browne       Many factors can affect your ability to remembera hectic lifestyle, aging, stress, chronic disease, and certain medicines. But, there are steps you can take to sharpen your mind and help preserve your memory. Challenge Your Brain   Regularly challenging your mind may help keeps it in top shape. Good mental exercises include:   Crossword puzzlesUse a dictionary if you need it; you will learn more that way. Brainteasers Try some!    Crafts, such as wood working and sewing   Hobbies, such as gardening and building model airplanes   SocializingVisit old friends or join groups to meet new ones. Reading   Learning a new language   Taking a class, whether it be art history or ceasar chi   TravelingExperience the food, history, and culture of your destination   Learning to use a computer   Going to museums, the theater, or thought-provoking movies   Changing things in your daily life, such as reversing your pattern in the grocery store or brushing your teeth using your nondominant hand   Use Memory Aids   There is no need to remember every detail on your own. These memory aids can help:   Calendars and day planners   Electronic organizers to store all sorts of helpful informationThese devices can \"beep\" to remind you of appointments. A book of days to record birthdays, anniversaries, and other occasions that occur on the same date every year   Detailed \"to-do\" lists and strategically placed sticky notes   Quick \"study\" sessionsBefore a gathering, review who will be there so their names will be fresh in your mind. Establish routinesFor example, keep your keys, wallet, and umbrella in the same place all the time or take medicine with your 8:00 AM glass of juice   Live a Healthy Life   Many actions that will keep your body strong will do the same for your mind. For example:   Talk to Your Doctor About Herbs and Supplements    Malnutrition and vitamin deficiencies can impair your mental function. For example, vitamin B12 deficiency can cause a range of symptoms, including confusion. But, what if your nutritional needs are being met? Can herbs and supplements still offer a benefit? Researchers have investigated a range of natural remedies, such as ginkgo , ginseng , and the supplement phosphatidylserine (PS). So far, though, the evidence is inconsistent as to whether these products can improve memory or thinking.    If you are interested in taking herbs and supplements, talk to your doctor first because they may interact with other medicines that you are taking. Exercise Regularly    Among the many benefits of regular exercise are increased blood flow to the brain and decreased risk of certain diseases that can interfere with memory function. One study found that even moderate exercise has a beneficial effect. Examples of \"moderate\" exercise include:   Playing 18 holes of golf once a week, without a cart   Playing tennis twice a week   Walking one mile per day   Manage Stress    It can be tough to remember what is important when your mind is cluttered. Make time for relaxation. Choose activities that calm you down, and make it routine. Manage Chronic Conditions    Side effects of high blood pressure , diabetes, and heart disease can interfere with mental function. Many of the lifestyle steps discussed here can help manage these conditions. Strive to eat a healthy diet, exercise regularly, get stress under control, and follow your doctor's advice for your condition. Minimize Medications    Talk to your doctor about the medicines that you take. Some may be unnecessary. Also, healthy lifestyle habits may lower the need for certain drugs. Last Reviewed: April 2010 Brynn Clark MD   Updated: 4/13/2010       Keeping Home a [facility]       As we get older, changes in balance, gait, strength, vision, hearing, and cognition make even the most youthful senior more prone to accidents. Falls are one of the leading health risks for older people.  This increased risk of falling is related to:   Aging process (eg, decreased muscle strength, slowed reflexes)   Higher incidence of chronic health problems (eg, arthritis, diabetes) that may limit mobility, agility or sensory awareness   Side effects of medicine (eg, dizziness, blurred vision)especially medicines like prescription pain medicines and drugs used to treat mental health conditions   Depending on the brittleness of your bones, the consequences of a fall can be serious and long lasting. Home Life   Research by the Association of A[employer] [facility]) shows that some home accidents among older adults can be prevented by making simple lifestyle changes and basic modifications and repairs to the home environment. Here are some lifestyle changes that experts recommend:   Have your hearing and vision checked regularly. Be sure to wear prescription glasses that are right for you. Speak to your doctor or pharmacist about the possible side effects of your medicines. A number of medicines can cause dizziness. If you have problems with sleep, talk to your doctor. Limit your intake of alcohol. If necessary, use a cane or walker to help maintain your balance. Wear supportive, rubber-soled shoes, even at home. If you live in a region that gets wintry weather, you may want to put special cleats on your shoes to prevent you from slipping on the snow and ice. Exercise regularly to help maintain muscle tone, agility, and balance. Always hold the banister when going up or down stairs. Also, use  bars when getting in or out of the bath or shower, or using the toilet. To avoid dizziness, get up slowly from a lying down position. Sit up first, dangling your legs for a minute or two before rising to a standing position. Overall Home Safety Check   According to the Consumer Product Safety Commision's \"Older Consumer Home Safety Checklist,\" it is important to check for potential hazards in each room. And remember, proper lighting is an essential factor in home safety. If you cannot see clearly, you are more likely to fall. Important questions to ask yourself include:   Are lamp, electric, extension, and telephone cords placed out of the flow of traffic and maintained in good condition? Have frayed cords been replaced? Are all small rugs and runners slip resistant?  If not, you can secure them to the floor with a special double-sided reach them. Make sure countertops are well-lit to avoid injuries while cutting and preparing food. In the Bathroom    Use a non-slip mat or decals in the tub and shower, since wet, soapy tile or porcelain surfaces are extremely slippery. Make sure bathroom rugs are non-skid or tape them firmly to the floor. Bathtubs should have at least one, preferably two, grab bars, firmly attached to structural supports in the wall. (Do not use built-in soap holders or glass shower doors as grab bars.)    Tub seats fitted with non-slip material on the legs allow you to wash sitting down. For people with limited mobility, bathtub transfer benches allow you to slide safely into the tub. Raised toilet seats and toilet safety rails are helpful for those with knee or hip problems. In the [facility]    Make sure you use a nightlight and that the area around your bed is clear of potential obstacles. Be careful with electric blankets and never go to sleep with a heating pad, which can cause serious burns even if on a low setting. Use fire-resistant mattress covers and pillows, and NEVER smoke in bed. Keep a phone next to the bed that is programmed to dial 911 at the push of a button. If you have a chronic condition, you may want to sign on with an automatic call-in service. Typically the system includes a small pendant that connects directly to an emergency medical voice-response system. You should also make arrangements to stay in contact with someonefriend, neighbor, family memberon a regular schedule. Fire Prevention   According to the [employer]. (Smoke Alarms for Every) [address] [place] [address], senior citizens are one of the two highest risk groups for death and serious injuries due to residential fires. When cooking, wear short-sleeved items, never a bulky long-sleeved robe.     The [facility]'s Safety Checklist for Older Consumers emphasizes the importance of checking basements, garages, workshops and storage areas for fire hazards, such as volatile liquids, piles of old rags or clothing and overloaded circuits. Never smoke in bed or when lying down on a couch or recliner chair. Small portable electric or kerosene heaters are responsible for many home fires and should be used cautiously if at all. If you do use one, be sure to keep them away from flammable materials. In case of fire, make sure you have a pre-established emergency exit plan. Have a professional check your fireplace and other fuel-burning appliances yearly. Helping Hands   Baby boomers entering the floyd years will continue to see the development of new products to help older adults live safely and independently in spite of age-related changes. Making Life More Livable  , by Earl Stein, lists over 1,000 products for \"living well in the mature years,\" such as bathing and mobility aids, household security devices, ergonomically designed knives and peelers, and faucet valves and knobs for temperature control. Medical supply stores and organizations are good sources of information about products that improve your quality of life and insure your safety.      Last Reviewed: November 2009 Verito Alvares MD   Updated: 3/7/2011

## 2021-06-02 NOTE — PROGRESS NOTES
Medicare Annual Wellness Visit  Name: Jose E Yen Date: 2021   MRN: [id number] Sex: Male   Age: 80 y.o. Ethnicity: Non-/Non    : 1934 Race: Rubi Hopkins is here for Medicare AWV    Screenings for behavioral, psychosocial and functional/safety risks, and cognitive dysfunction are all negative except as indicated below. These results, as well as other patient data from the [address] form, are documented in Flowsheets linked to this Encounter. Allergies   Allergen Reactions    Amlodipine      Caused Dizziness  Caused Dizziness      Codeine      derivatives       Prior to Visit Medications    Medication Sig Taking?  Authorizing Provider   warfarin (COUMADIN) 10 MG tablet Take 10 mg by mouth daily EXCEPT 5mg every Monday and Friday or as directed by [facility] [phone] Yes Historical Provider, MD   metoprolol succinate (TOPROL XL) 25 MG extended release tablet Take 1 tablet by mouth nightly Yes Tereso Chavez, DO   carbidopa-levodopa (SINEMET)  MG per tablet Take 2 tablets by mouth 4 times daily Yes  Kurtis, DO   aspirin 81 MG tablet Take 81 mg by mouth daily Yes Historical Provider, MD       Past Medical History:   Diagnosis Date    Colon polyps     Mechanical heart valve present     S/P aortic valve replacement with metallic valve     9311       Past Surgical History:   Procedure Laterality Date    AORTIC VALVE REPLACEMENT      COLONOSCOPY          COLONOSCOPY  2012    Dr Barby Alexis    COLONOSCOPY      several polyps found follow up in three years   [address]         Family History   Problem Relation Age of Onset    Heart Disease Mother    Osito Sindhu Mother    Salud Ricardoxley Mother     Parkinsonism Father     Breast Cancer Sister     Prostate Cancer Maternal Grandfather     Stomach Cancer Paternal Grandfather        CareTeam (Including outside providers/suppliers regularly involved with his/her dentist       Personalized Preventive Plan   Current Health Maintenance Status  Immunization History   Administered Date(s) Administered    COVID-19, Clinton Peter, PF, 30mcg/0.3mL 01/20/2021, 02/10/2021    Influenza, High Dose (Fluzone 65 yrs and older) 09/25/2020    Zoster Recombinant (Shingrix) 09/22/2020        Health Maintenance   Topic Date Due    DTaP/Tdap/Td vaccine (1 - Tdap) Never done   Jazmyn Greco Annual Wellness Visit (AWV)  Never done    Flu vaccine  Completed    Shingles Vaccine  Completed    Pneumococcal 65+ years Vaccine  Completed    COVID-19 Vaccine  Completed    Hepatitis A vaccine  Aged Out    Hepatitis B vaccine  Aged Out    Hib vaccine  Aged Out    Meningococcal (ACWY) vaccine  Aged Out     Recommendations for [facility] Due: see orders and patient instructions/AVS.  . Recommended screening schedule for the next 5-10 years is provided to the patient in written form: see Patient Instructions/AVS.    Jean Ramos LPN, 0/1/0043, performed the documented evaluation under the direct supervision of the attending physician.

## 2021-06-15 RX ORDER — METOPROLOL SUCCINATE 25 MG/1
TABLET, EXTENDED RELEASE ORAL
Qty: 90 TABLET | Refills: 3 | Status: SHIPPED | OUTPATIENT
Start: 2021-06-15 | End: 2022-05-02

## 2021-07-07 ENCOUNTER — ANTI-COAG VISIT (OUTPATIENT)
Dept: PHARMACY | Age: 86
End: 2021-07-07
Payer: MEDICARE

## 2021-07-07 ENCOUNTER — HOSPITAL ENCOUNTER (OUTPATIENT)
Dept: NON INVASIVE DIAGNOSTICS | Age: 86
Discharge: HOME | End: 2021-07-07
Payer: MEDICARE

## 2021-07-07 DIAGNOSIS — I10 ESSENTIAL HYPERTENSION: ICD-10-CM

## 2021-07-07 DIAGNOSIS — Z79.01 ANTICOAGULATED ON COUMADIN: Primary | ICD-10-CM

## 2021-07-07 DIAGNOSIS — Z95.2 HX OF ARTIFICIAL HEART VALVE REPLACEMENT: ICD-10-CM

## 2021-07-07 LAB
INTERNATIONAL NORMALIZATION RATIO, POC: 2.6
LV EF: 58 %
LVEF MODALITY: NORMAL

## 2021-07-07 PROCEDURE — 85610 PROTHROMBIN TIME: CPT

## 2021-07-07 PROCEDURE — 93306 TTE W/DOPPLER COMPLETE: CPT

## 2021-07-07 PROCEDURE — 99211 OFF/OP EST MAY X REQ PHY/QHP: CPT

## 2021-07-07 NOTE — PROGRESS NOTES
Mr. Rakesh Nickerson is a 80 y.o.  male. Mr. Rakesh Nickerson had an INR test today. Results were reviewed and appropriate warfarin management was completed. This visit was performed as: An in person visit. Protocols were followed with precautions to reduce the spread of COVID-19. Patient verifies current dosing regimen: Yes     Warfarin medication reviewed and updated on the patient 's home medication list: Yes   All other medications reviewed and updated on the patient 's home medication list: No: no changes     Lab Results   Component Value Date    INR 2.6 2021    INR 2.6 2021    INR 3.0 2021       Patient Findings     Positives:  Signs/symptoms of bleeding    Negatives:  Change in medications, Change in diet/appetite    Comments:  He cut his right wrist about a week ago and has had trouble getting it to heal. Still seeping blood when he changes his bandage. Anticoagulation Summary  As of 2021    INR goal:  2.5-3.5   TTR:  71.4 % (6 mo)   INR used for dosin.6 (2021)   Warfarin maintenance plan:  5 mg (10 mg x 0.5) every Mon, Fri; 10 mg (10 mg x 1) all other days   Weekly warfarin total:  60 mg   Plan last modified:  Mecca Holden [facility] (2021)   Next INR check:  2021   Priority:  Maintenance   Target end date:   Indefinite    Indications    Anticoagulated on Coumadin [Z79.01]  Hx of artificial heart valve replacement [Z95.2]             Anticoagulation Episode Summary     INR check location:      Preferred lab:      Send INR reminders to:  [facility]AFF    Comments:  [facility]      Anticoagulation Care Providers     Provider Role Specialty Phone number    Agustin Mcmillan DO Referring Family Medicine [phone]          Warfarin assessment / plan:     Mr. Jody Moreno was getting his ECHO this morning and evidently he reported have the cut on his wrist that would not heal. They called and asked if we could see him today and move his appointment from 7/9/21. His INR is in range today and on the low end of range which is ideal for his wound to heal.     I will not change his warfarin dose. I did give him a couple non-adherent gauze pads. He is putting an antibiotic ointment on his wound as well which I advised was a good choice. I advised to call his physician if it worsens or continues to not heal.     Description    CONTINUE: Warfarin 10 mg daily except 5 mg on Monday and Fridays    Call [phone] with signs or symptoms of bleeding or ANY medication changes (including over-the-counter medications or herbal supplements). If significant bleeding occurs please seek immediate medical attention. Keep the number of servings of vitamin K containing foods (dark green, leafy vegetables) the same each week. Please call if this changes. Salad every other day and a green vegetable most days of the week. He does not eat dark green leafy vegetables. Limit alcohol intake. He drinks a glass of wine most evenings. Please call if this changes. Immunization History   Administered Date(s) Administered    COVID-19, Pfizer, PF, 30mcg/0.3mL 01/20/2021, 02/10/2021    Influenza, High Dose (Fluzone 65 yrs and older) 09/25/2020    Zoster Recombinant (Shingrix) 09/22/2020         Reviewed AVS with patient / caregiver.       CLINICAL PHARMACY CONSULT: MED RECONCILIATION/REVIEW ADDENDUM    For Pharmacy Admin Tracking Only     Intervention Detail:    Total # of Interventions Recommended: 0   Total # of Interventions Accepted: 0   Time Spent (min): 20

## 2021-08-04 ENCOUNTER — ANTI-COAG VISIT (OUTPATIENT)
Dept: PHARMACY | Age: 86
End: 2021-08-04
Payer: MEDICARE

## 2021-08-04 DIAGNOSIS — Z95.2 HX OF ARTIFICIAL HEART VALVE REPLACEMENT: ICD-10-CM

## 2021-08-04 DIAGNOSIS — Z79.01 ANTICOAGULATED ON COUMADIN: Primary | ICD-10-CM

## 2021-08-04 LAB — INTERNATIONAL NORMALIZATION RATIO, POC: 2.7

## 2021-08-04 PROCEDURE — 99211 OFF/OP EST MAY X REQ PHY/QHP: CPT

## 2021-08-04 PROCEDURE — 85610 PROTHROMBIN TIME: CPT

## 2021-08-04 NOTE — PROGRESS NOTES
attention. Keep the number of servings of vitamin K containing foods (dark green, leafy vegetables) the same each week. Please call if this changes. Salad every other day and a green vegetable most days of the week. He does not eat dark green leafy vegetables. Limit alcohol intake. He drinks a glass of wine most evenings. Please call if this changes. Immunization History   Administered Date(s) Administered    COVID-19, Pfizer, PF, 30mcg/0.3mL 01/20/2021, 02/10/2021    Influenza, High Dose (Fluzone 65 yrs and older) 09/25/2020    Zoster Recombinant (Shingrix) 09/22/2020         Reviewed AVS with patient / caregiver.       CLINICAL PHARMACY CONSULT: MED RECONCILIATION/REVIEW ADDENDUM    For Pharmacy Admin Tracking Only     Intervention Detail:    Total # of Interventions Recommended: 0   Total # of Interventions Accepted: 0   Time Spent (min): 20

## 2021-08-06 ENCOUNTER — OFFICE VISIT (OUTPATIENT)
Dept: FAMILY MEDICINE CLINIC | Age: 86
End: 2021-08-06
Payer: MEDICARE

## 2021-08-06 VITALS
DIASTOLIC BLOOD PRESSURE: 64 MMHG | HEART RATE: 64 BPM | WEIGHT: 174.2 LBS | BODY MASS INDEX: 25 KG/M2 | SYSTOLIC BLOOD PRESSURE: 122 MMHG | OXYGEN SATURATION: 96 %

## 2021-08-06 DIAGNOSIS — Z79.01 ANTICOAGULATED ON COUMADIN: ICD-10-CM

## 2021-08-06 DIAGNOSIS — Z95.2 HX OF ARTIFICIAL HEART VALVE REPLACEMENT: ICD-10-CM

## 2021-08-06 DIAGNOSIS — G20 PARKINSON DISEASE (HCC): Primary | ICD-10-CM

## 2021-08-06 PROCEDURE — 99214 OFFICE O/P EST MOD 30 MIN: CPT | Performed by: FAMILY MEDICINE

## 2021-08-06 SDOH — ECONOMIC STABILITY: FOOD INSECURITY: WITHIN THE PAST 12 MONTHS, THE FOOD YOU BOUGHT JUST DIDN'T LAST AND YOU DIDN'T HAVE MONEY TO GET MORE.: NEVER TRUE

## 2021-08-06 SDOH — ECONOMIC STABILITY: FOOD INSECURITY: WITHIN THE PAST 12 MONTHS, YOU WORRIED THAT YOUR FOOD WOULD RUN OUT BEFORE YOU GOT MONEY TO BUY MORE.: NEVER TRUE

## 2021-08-06 ASSESSMENT — ENCOUNTER SYMPTOMS
DIARRHEA: 0
VOMITING: 0
SORE THROAT: 0
NAUSEA: 0
RHINORRHEA: 0
SINUS PRESSURE: 0
COUGH: 0
ABDOMINAL PAIN: 0
SHORTNESS OF BREATH: 0

## 2021-08-06 ASSESSMENT — SOCIAL DETERMINANTS OF HEALTH (SDOH): HOW HARD IS IT FOR YOU TO PAY FOR THE VERY BASICS LIKE FOOD, HOUSING, MEDICAL CARE, AND HEATING?: NOT HARD AT ALL

## 2021-08-06 NOTE — PROGRESS NOTES
2021     Oz Richmond (:  1934) is a 80 y.o. male, here for evaluation of the following medical concerns:    HPI      1.  Aortic valve- caumadin 10 mg  four times a week, 5 mg three times a week, last INR was wnl, he is wanting it checked today, recently saw his new  Cardiologist, feels well and doesn't have a concern today. He is being managed by the Coumadin clinic.      2.  Parkinsons disease- tremors at rest, ambulation is wnl, follows with Neurology at [facility], feels it is well controlled on the medication, denied a side effect from the medication, takes it as prescribed.     \"1. 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 He has mild wearing off with return of tremor. Impaired sleep maintenance likely represents sub-optimal dopamine coverage overnight. \"      Today, denied chest pain, sob, n, v, or diarrhea.   Review of Systems   Constitutional: Negative for activity change, fatigue, fever and unexpected weight change. HENT: Negative for congestion, ear pain, rhinorrhea, sinus pressure and sore throat. Respiratory: Negative for cough and shortness of breath. Cardiovascular: Negative for chest pain, palpitations and leg swelling. Gastrointestinal: Negative for abdominal pain, diarrhea, nausea and vomiting. Endocrine: Negative for cold intolerance, heat intolerance, polydipsia and polyphagia. Musculoskeletal: Negative for arthralgias. Skin: Negative for rash. Neurological: Negative for dizziness, light-headedness and headaches. Psychiatric/Behavioral: Negative for dysphoric mood. The patient is not nervous/anxious. Prior to Visit Medications    Medication Sig Taking?  Authorizing Provider   metoprolol succinate (TOPROL XL) 25 MG extended release tablet TAKE 1 TABLET NIGHTLY Yes Delicia Bailey, DO

## 2021-09-01 ENCOUNTER — ANTI-COAG VISIT (OUTPATIENT)
Dept: PHARMACY | Age: 86
End: 2021-09-01
Payer: MEDICARE

## 2021-09-01 DIAGNOSIS — Z95.2 HX OF ARTIFICIAL HEART VALVE REPLACEMENT: ICD-10-CM

## 2021-09-01 DIAGNOSIS — Z79.01 ANTICOAGULATED ON COUMADIN: Primary | ICD-10-CM

## 2021-09-01 LAB — INTERNATIONAL NORMALIZATION RATIO, POC: 2.8

## 2021-09-01 PROCEDURE — 99211 OFF/OP EST MAY X REQ PHY/QHP: CPT

## 2021-09-01 PROCEDURE — 85610 PROTHROMBIN TIME: CPT

## 2021-09-01 NOTE — PROGRESS NOTES
Mr. Redd Monahan is a 80 y.o.  male. Mr. Redd Monahan had an INR test today. Results were reviewed and appropriate warfarin management was completed. This visit was performed as: An in person visit. Protocols were followed with precautions to reduce the spread of COVID-19. Patient verifies current dosing regimen: Yes     Warfarin medication reviewed and updated on the patient 's home medication list: Yes   All other medications reviewed and updated on the patient 's home medication list: No: no changes     Lab Results   Component Value Date    INR 2.8 2021    INR 2.7 2021    INR 2.6 2021           Anticoagulation Summary  As of 2021    INR goal:  2.5-3.5   TTR:  78.1 % (7.9 mo)   INR used for dosin.8 (2021)   Warfarin maintenance plan:  5 mg (10 mg x 0.5) every Mon, Fri; 10 mg (10 mg x 1) all other days   Weekly warfarin total:  60 mg   Plan last modified:  Mecca Holden RPH (2021)   Next INR check:  10/6/2021   Priority:  Maintenance   Target end date: Indefinite    Indications    Anticoagulated on Coumadin [Z79.01]  Hx of artificial heart valve replacement [Z95.2]             Anticoagulation Episode Summary     INR check location:      Preferred lab:      Send INR reminders to:  [facility]AFF    Comments:  [facility]      Anticoagulation Care Providers     Provider Role Specialty Phone number    Anyi Franklin DO Referring Family Medicine [phone]          Warfarin assessment / plan:     Looks and feels well today. No changes in medications or diet. No bruising or bleeding noted. No change in dose for INR of  2.8. Will see in 5 weeks for next INR. Description    CONTINUE: Warfarin 10 mg daily except 5 mg on Monday and     Call [phone] with signs or symptoms of bleeding or ANY medication changes (including over-the-counter medications or herbal supplements).        If significant bleeding occurs please seek immediate medical attention. Keep the number of servings of vitamin K containing foods (dark green, leafy vegetables) the same each week. Please call if this changes. Salad every other day and a green vegetable most days of the week. He does not eat dark green leafy vegetables. Limit alcohol intake. He drinks a glass of wine most evenings. Please call if this changes. Immunization History   Administered Date(s) Administered    COVID-19, Pfizer, PF, 30mcg/0.3mL 01/20/2021, 02/10/2021    Influenza, High Dose (Fluzone 65 yrs and older) 09/25/2020    Zoster Recombinant (Shingrix) 09/22/2020         Reviewed AVS with patient / caregiver.       CLINICAL PHARMACY CONSULT: MED RECONCILIATION/REVIEW ADDENDUM    For Pharmacy Admin Tracking Only     Intervention Detail:    Total # of Interventions Recommended: 0   Total # of Interventions Accepted: 0   Time Spent (min): 20

## 2021-09-09 RX ORDER — WARFARIN SODIUM 10 MG/1
10 TABLET ORAL DAILY
Qty: 30 TABLET | Refills: 0 | Status: SHIPPED | OUTPATIENT
Start: 2021-09-09 | End: 2021-11-08

## 2021-09-09 NOTE — TELEPHONE ENCOUNTER
----- Message from Yordy Guzman sent at 9/9/2021  8:44 AM EDT -----  Subject: Refill Request    QUESTIONS  Name of Medication? Other - warfarin (COUMADIN) 10 MG tablet  Patient-reported dosage and instructions? Once a day  How many days do you have left? 14  Preferred Pharmacy? [address] phone number (if available)? [phone]  ---------------------------------------------------------------------------  --------------  CALL BACK INFO  What is the best way for the office to contact you? OK to leave message on   voicemail  Preferred Call Back Phone Number?  [phone]

## 2021-10-06 ENCOUNTER — ANTI-COAG VISIT (OUTPATIENT)
Dept: PHARMACY | Age: 86
End: 2021-10-06
Payer: MEDICARE

## 2021-10-06 LAB — INTERNATIONAL NORMALIZATION RATIO, POC: 3.4

## 2021-10-06 PROCEDURE — 99211 OFF/OP EST MAY X REQ PHY/QHP: CPT

## 2021-10-06 PROCEDURE — 85610 PROTHROMBIN TIME: CPT

## 2021-10-06 NOTE — PROGRESS NOTES
Mr. Marisela Elizabeth is a 80 y.o.  male. Mr. Marisela Elizabeth had an INR test today. Results were reviewed and appropriate warfarin management was completed. This visit was performed as: An in person visit. Protocols were followed with precautions to reduce the spread of COVID-19. Patient verifies current dosing regimen: Yes     Warfarin medication reviewed and updated on the patient 's home medication list: Yes   All other medications reviewed and updated on the patient 's home medication list: No: No Changes     Lab Results   Component Value Date    INR 3.4 10/06/2021    INR 2.8 09/01/2021    INR 2.7 08/04/2021       Patient Findings     Negatives:  Signs/symptoms of bleeding, Missed doses, Change in medications, Change in diet/appetite, Bruising          Anticoagulation Summary  As of 10/6/2021    INR goal:  2.5-3.5   TTR:  81.0 % (9 mo)   INR used for dosing:  3.4 (10/6/2021)   Warfarin maintenance plan:  5 mg (10 mg x 0.5) every Mon, Fri; 10 mg (10 mg x 1) all other days   Weekly warfarin total:  60 mg   Plan last modified:  Mecca Holden RPH (4/16/2021)   Next INR check:  11/17/2021   Priority:  Maintenance   Target end date: Indefinite    Indications    Anticoagulated on Coumadin [Z79.01]  Hx of artificial heart valve replacement [Z95.2]             Anticoagulation Episode Summary     INR check location:      Preferred lab:      Send INR reminders to:  [facility]    Comments:  [facility]      Anticoagulation Care Providers     Provider Role Specialty Phone number    Karissa Aguila DO Referring Family Medicine [phone]          Warfarin assessment / plan:     Patient appears to be doing well with no complaints. Patient reports no signs of bleeding or bruising. INR reading today was 3.4. Continue current warfarin dose and recheck in 6 weeks.      Description    CONTINUE: Warfarin 10 mg daily except 5 mg on Monday and Fridays    Call [phone] with signs or symptoms of bleeding or ANY medication changes (including over-the-counter medications or herbal supplements). If significant bleeding occurs please seek immediate medical attention. Keep the number of servings of vitamin K containing foods (dark green, leafy vegetables) the same each week. Please call if this changes. Salad every other day and a green vegetable most days of the week. He does not eat dark green leafy vegetables. Limit alcohol intake. He drinks a glass of wine most evenings. Please call if this changes. Immunization History   Administered Date(s) Administered    COVID-19, Pfizer, PF, 30mcg/0.3mL 01/20/2021, 02/10/2021    Influenza, High Dose (Fluzone 65 yrs and older) 09/25/2020    Zoster Recombinant (Shingrix) 09/22/2020         Reviewed AVS with patient / caregiver.       CLINICAL PHARMACY CONSULT: MED RECONCILIATION/REVIEW ADDENDUM    For Pharmacy Admin Tracking Only     Intervention Detail:    Total # of Interventions Recommended: 0   Total # of Interventions Accepted: 0   Time Spent (min): 20

## 2021-11-08 RX ORDER — WARFARIN SODIUM 10 MG/1
TABLET ORAL
Qty: 30 TABLET | Refills: 9 | Status: SHIPPED | OUTPATIENT
Start: 2021-11-08 | End: 2022-03-25 | Stop reason: SDUPTHER

## 2021-11-17 ENCOUNTER — ANTI-COAG VISIT (OUTPATIENT)
Dept: PHARMACY | Age: 86
End: 2021-11-17
Payer: MEDICARE

## 2021-11-17 DIAGNOSIS — Z79.01 ANTICOAGULATED ON COUMADIN: Primary | ICD-10-CM

## 2021-11-17 DIAGNOSIS — Z95.2 HX OF ARTIFICIAL HEART VALVE REPLACEMENT: ICD-10-CM

## 2021-11-17 LAB — INR BLD: 4.1

## 2021-11-17 PROCEDURE — 85610 PROTHROMBIN TIME: CPT

## 2021-11-17 PROCEDURE — 99211 OFF/OP EST MAY X REQ PHY/QHP: CPT

## 2021-11-17 NOTE — PROGRESS NOTES
Eric Nicole is a 80 y.o. here for warfarin management. Xavi Durant had an INR test today. Results were reviewed and appropriate warfarin management was completed. This visit was performed as: An in person visit. Protocols were followed with precautions to reduce the spread of COVID-19. Patient verifies current dosing regimen: Yes     Warfarin medication reviewed and updated on the patient 's home medication list: Yes   All other medications reviewed and updated on the patient 's home medication list: No: no changes     Lab Results   Component Value Date    INR 4.10 2021    INR 3.4 10/06/2021    INR 2.8 2021       Patient Findings     Negatives:  Signs/symptoms of bleeding, Missed doses, Change in medications, Change in diet/appetite, Bruising          Anticoagulation Summary  As of 2021    INR goal:  2.5-3.5   TTR:  72.0 % (10.4 mo)   INR used for dosin.10 (2021)   Warfarin maintenance plan:  5 mg (10 mg x 0.5) every Mon, Fri; 10 mg (10 mg x 1) all other days   Weekly warfarin total:  60 mg   Plan last modified:  Mecca Holden RPH (2021)   Next INR check:  12/15/2021   Priority:  Maintenance   Target end date:   Indefinite    Indications    Anticoagulated on Coumadin [Z79.01]  Hx of artificial heart valve replacement [Z95.2]             Anticoagulation Episode Summary     INR check location:      Preferred lab:      Send INR reminders to:  [facility]    Comments:  [facility]      Anticoagulation Care Providers     Provider Role Specialty Phone number    Miya Cordova DO Referring Family Medicine [phone]          Warfarin assessment / plan:     Denies medication changes   Denies extra warfarin doses  Denies increased alcohol intake  Denies illness, fever, or diarrhea  Denies change in appetite  Denies decrease in vitamin K intake   Denies cranberry juice intake  Denies signs and symptoms of bleeding/bruising      INR elevated today at 4.1 with no known cause. This warfarin dose has kept him in range for several months now, so we will hold his dose tomorrow and then resume his regular dose. Recheck in 4 weeks. Description    TOMORROW: do not take warfarin  CONTINUE: Warfarin 10 mg daily except 5 mg on Monday and Fridays    Call [phone] with signs or symptoms of bleeding or ANY medication changes (including over-the-counter medications or herbal supplements). If significant bleeding occurs please seek immediate medical attention. Keep the number of servings of vitamin K containing foods (dark green, leafy vegetables) the same each week. Please call if this changes. Salad every other day and a green vegetable most days of the week. He does not eat dark green leafy vegetables. Limit alcohol intake. He drinks a glass of wine most evenings. Please call if this changes. Immunization History   Administered Date(s) Administered    COVID-19, Pfizer, PF, 30mcg/0.3mL 01/20/2021, 02/10/2021, 10/06/2021    Influenza, High Dose (Fluzone 65 yrs and older) 09/25/2020    Influenza, Quadv, adjuvanted, 65 yrs +, IM, PF (Fluad) 10/06/2021    Zoster Recombinant (Shingrix) 09/22/2020             Reviewed AVS with patient / caregiver.       CLINICAL PHARMACY CONSULT: MED RECONCILIATION/REVIEW ADDENDUM    For Pharmacy Admin Tracking Only     Intervention Detail: Dose Adjustment: 1, reason: Therapy Optimization   Total # of Interventions Recommended: 1   Total # of Interventions Accepted: 1   Time Spent (min): 15

## 2021-12-03 ENCOUNTER — OFFICE VISIT (OUTPATIENT)
Dept: FAMILY MEDICINE CLINIC | Age: 86
End: 2021-12-03
Payer: MEDICARE

## 2021-12-03 VITALS
OXYGEN SATURATION: 97 % | HEART RATE: 68 BPM | BODY MASS INDEX: 24.62 KG/M2 | DIASTOLIC BLOOD PRESSURE: 76 MMHG | WEIGHT: 172 LBS | HEIGHT: 70 IN | SYSTOLIC BLOOD PRESSURE: 124 MMHG | RESPIRATION RATE: 18 BRPM

## 2021-12-03 DIAGNOSIS — G20 PARKINSON DISEASE (HCC): Primary | ICD-10-CM

## 2021-12-03 DIAGNOSIS — Z95.2 HX OF ARTIFICIAL HEART VALVE REPLACEMENT: ICD-10-CM

## 2021-12-03 DIAGNOSIS — Z79.01 ANTICOAGULATED ON COUMADIN: ICD-10-CM

## 2021-12-03 PROCEDURE — 99214 OFFICE O/P EST MOD 30 MIN: CPT | Performed by: FAMILY MEDICINE

## 2021-12-03 ASSESSMENT — ENCOUNTER SYMPTOMS
DIARRHEA: 0
ABDOMINAL PAIN: 0
TROUBLE SWALLOWING: 0
SHORTNESS OF BREATH: 0
RHINORRHEA: 0
NAUSEA: 0
COUGH: 0
SINUS PRESSURE: 0
VOMITING: 0
SORE THROAT: 0

## 2021-12-03 NOTE — PROGRESS NOTES
12/3/2021     Mateo Ag (:  1934) is a 80 y.o. male, here for evaluation of the following medical concerns:    HPI     1.  Aortic valve- caumadin 10 mg  four times a week, 5 mg three times a week, last INR was wnl, he is wanting it checked today, recently saw his new  Cardiologist, feels well and doesn't have a concern today. [facility] is being managed by the Coumadin clinic.      2.  Parkinsons disease- tremors at rest, ambulation is wnl, follows with Neurology at [facility], feels it is well controlled on the medication, denied a side effect from the medication, takes it as prescribed.     \"1. 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 He has mild wearing off with return of tremor. Impaired sleep maintenance likely represents sub-optimal dopamine coverage overnight. \"      Today, denied chest pain, sob, n, v, or diarrhea.   Review of Systems   Constitutional: Negative for activity change, fatigue, fever and unexpected weight change. HENT: Negative for congestion, ear pain, rhinorrhea, sinus pressure, sore throat and trouble swallowing. Respiratory: Negative for cough and shortness of breath. Cardiovascular: Negative for chest pain, palpitations and leg swelling. Gastrointestinal: Negative for abdominal pain, diarrhea, nausea and vomiting. Endocrine: Negative for cold intolerance, heat intolerance, polydipsia and polyphagia. Musculoskeletal: Negative for arthralgias. Skin: Negative for rash. Neurological: Positive for tremors. Negative for dizziness, light-headedness and headaches. Psychiatric/Behavioral: Negative for dysphoric mood. The patient is not nervous/anxious. Prior to Visit Medications    Medication Sig Taking?  Authorizing Provider   warfarin (COUMADIN) 10 MG tablet TAKE 1 TABLET DAILY EXCEPT 5 MG EVERY MONDAY AND FRIDAY OR AS DIRECTED BY [facility] [phone] Yes Janae Benson, DO   metoprolol succinate (TOPROL XL) 25 MG extended release tablet TAKE 1 TABLET NIGHTLY Yes Tereso Chavez, DO   carbidopa-levodopa (SINEMET)  MG per tablet Take 2 tablets by mouth 4 times daily Yes Janae Benson, DO   aspirin 81 MG tablet Take 81 mg by mouth daily Yes Historical Provider, MD        Social History     Tobacco Use    Smoking status: Never Smoker    Smokeless tobacco: Never Used   Substance Use Topics    Alcohol use: Yes     Alcohol/week: 1.0 standard drink     Types: 1 Glasses of wine per week     Comment: wine weekly        Vitals:    12/03/21 1003   BP: 124/76   Pulse: 68   Resp: 18   SpO2: 97%   Weight: 172 lb (78 kg)   Height: 5' 10\" (1.778 m)     Estimated body mass index is 24.68 kg/m² as calculated from the following:    Height as of this encounter: 5' 10\" (1.778 m). Weight as of this encounter: 172 lb (78 kg). Physical Exam  Vitals and nursing note reviewed. Constitutional:       Appearance: He is well-developed. HENT:      Head: Normocephalic and atraumatic. Right Ear: Tympanic membrane, ear canal and external ear normal.      Left Ear: Tympanic membrane, ear canal and external ear normal.      Nose: Nose normal.      Mouth/Throat:      Mouth: Mucous membranes are moist.   Eyes:      Pupils: Pupils are equal, round, and reactive to light. Neck:      Thyroid: No thyromegaly. Cardiovascular:      Rate and Rhythm: Normal rate and regular rhythm. Heart sounds: No murmur heard. Pulmonary:      Effort: Pulmonary effort is normal.      Breath sounds: Normal breath sounds. No wheezing or rales. Abdominal:      General: Bowel sounds are normal.      Palpations: Abdomen is soft. Tenderness: There is no abdominal tenderness. Musculoskeletal:         General: Normal range of motion. Lymphadenopathy:      Cervical: No cervical adenopathy.    Skin: Findings: No rash. Neurological:      Mental Status: He is alert and oriented to person, place, and time. Psychiatric:         Behavior: Behavior normal.         Judgment: Judgment normal.         ASSESSMENT/PLAN:  1. Parkinson disease ([address]  Stable  Continue with medication  Keep appointments with specialist.   Gely Rodas questions    2. Anticoagulated on Coumadin  Referred for procedure  Educated on the importance of having this done. Answered questions    3. Hx of artificial heart valve replacement  Stable  Continue with medication  Keep appointments with specialist.   Gely Rodas questions      Return in about 3 months (around 3/3/2022).

## 2021-12-07 PROBLEM — R10.9 BELLY PAIN: Status: RESOLVED | Noted: 2019-10-19 | Resolved: 2021-12-07

## 2021-12-15 ENCOUNTER — ANTI-COAG VISIT (OUTPATIENT)
Dept: PHARMACY | Age: 86
End: 2021-12-15
Payer: MEDICARE

## 2021-12-15 DIAGNOSIS — Z79.01 ANTICOAGULATED ON COUMADIN: Primary | ICD-10-CM

## 2021-12-15 DIAGNOSIS — Z95.2 HX OF ARTIFICIAL HEART VALVE REPLACEMENT: ICD-10-CM

## 2021-12-15 LAB — INR BLD: 3.4

## 2021-12-15 PROCEDURE — 99211 OFF/OP EST MAY X REQ PHY/QHP: CPT

## 2021-12-15 PROCEDURE — 85610 PROTHROMBIN TIME: CPT

## 2021-12-15 NOTE — PROGRESS NOTES
Abdirashid Upton is a 80 y.o. here for warfarin management. Katelynn Tello had an INR test today. Results were reviewed and appropriate warfarin management was completed. This visit was performed as: An in person visit. Protocols were followed with precautions to reduce the spread of COVID-19. Patient verifies current dosing regimen: Yes     Warfarin medication reviewed and updated on the patient 's home medication list: Yes   All other medications reviewed and updated on the patient 's home medication list: No: No changes     Lab Results   Component Value Date    INR 3.40 12/15/2021    INR 4.10 11/17/2021    INR 3.4 10/06/2021       Patient Findings     Negatives:  Signs/symptoms of bleeding, Missed doses, Change in medications, Change in diet/appetite, Bruising          Anticoagulation Summary  As of 12/15/2021    INR goal:  2.5-3.5   TTR:  67.3 % (11.4 mo)   INR used for dosing:  3.40 (12/15/2021)   Warfarin maintenance plan:  5 mg (10 mg x 0.5) every Mon, Fri; 10 mg (10 mg x 1) all other days   Weekly warfarin total:  60 mg   Plan last modified:  Mecca Holden RPH (4/16/2021)   Next INR check:  1/12/2022   Priority:  Maintenance   Target end date: Indefinite    Indications    Anticoagulated on Coumadin [Z79.01]  Hx of artificial heart valve replacement [Z95.2]             Anticoagulation Episode Summary     INR check location:      Preferred lab:      Send INR reminders to:  [facility]    Comments:  [facility]      Anticoagulation Care Providers     Provider Role Specialty Phone number    Dipak AlanisDO gerardo Referring Family Medicine [phone]          Warfarin assessment / plan:     Appears well. No changes   No acute findings     No change to warfarin therapy today. Patient looks and feels well today. INR was therapeutic today at 3.4. He was instructed to continue his current warfarin regimen. Next INR check 1/12/22.     Description    CONTINUE: Warfarin 10 mg daily except 5 mg on Monday and Fridays    Call [phone] with signs or symptoms of bleeding or ANY medication changes (including over-the-counter medications or herbal supplements). If significant bleeding occurs please seek immediate medical attention. Keep the number of servings of vitamin K containing foods (dark green, leafy vegetables) the same each week. Please call if this changes. Salad every other day and a green vegetable most days of the week. He does not eat dark green leafy vegetables. Limit alcohol intake. He drinks a glass of wine most evenings. Please call if this changes. Immunization History   Administered Date(s) Administered    COVID-19, Pfizer, PF, 30mcg/0.3mL 01/20/2021, 02/10/2021, 10/06/2021    Influenza, High Dose (Fluzone 65 yrs and older) 09/25/2020    Influenza, Quadv, adjuvanted, 65 yrs +, IM, PF (Fluad) 10/06/2021    Zoster Recombinant (Shingrix) 09/22/2020             Reviewed AVS with patient / caregiver.       CLINICAL PHARMACY CONSULT: MED RECONCILIATION/REVIEW ADDENDUM    For Pharmacy Admin Tracking Only     Intervention Detail:    Total # of Interventions Recommended: 0   Total # of Interventions Accepted: 0   Time Spent (min): 20

## 2022-01-19 ENCOUNTER — ANTI-COAG VISIT (OUTPATIENT)
Dept: PHARMACY | Age: 87
End: 2022-01-19
Payer: MEDICARE

## 2022-01-19 LAB — INTERNATIONAL NORMALIZATION RATIO, POC: 3.2

## 2022-01-19 PROCEDURE — 99211 OFF/OP EST MAY X REQ PHY/QHP: CPT

## 2022-01-19 PROCEDURE — 85610 PROTHROMBIN TIME: CPT

## 2022-01-19 NOTE — PROGRESS NOTES
Gabriel Russell is a 80 y.o. here for warfarin management. [facility] had an INR test today. Results were reviewed and appropriate warfarin management was completed. This visit was performed as: An in person visit. Protocols were followed with precautions to reduce the spread of COVID-19. Patient verifies current dosing regimen: Yes     Warfarin medication reviewed and updated on the patient 's home medication list: Yes   All other medications reviewed and updated on the patient 's home medication list: Yes : No recent changes    Lab Results   Component Value Date    INR 3.2 01/19/2022    INR 3.40 12/15/2021    INR 4.10 11/17/2021           Anticoagulation Summary  As of 1/19/2022    INR goal:  2.5-3.5   TTR:  70.3 % (1 y)   INR used for dosing:  3.2 (1/19/2022)   Warfarin maintenance plan:  5 mg (10 mg x 0.5) every Mon, Fri; 10 mg (10 mg x 1) all other days   Weekly warfarin total:  60 mg   Plan last modified:  Mecca Holden RPH (4/16/2021)   Next INR check:  3/2/2022   Priority:  Maintenance   Target end date: Indefinite    Indications    Anticoagulated on Coumadin [Z79.01]  Hx of artificial heart valve replacement [Z95.2]             Anticoagulation Episode Summary     INR check location:      Preferred lab:      Send INR reminders to:  [facility]FF    Comments:  [facility]      Anticoagulation Care Providers     Provider Role Specialty Phone number    Diogo Severino DO Referring Family Medicine [phone]          Warfarin assessment / plan: Looks and feels well today. No changes in medications or diet. No bruising or bleeding noted. No change in dose for INR of  3.2. Will see in 6 weeks for next INR(he declines to come in any sooner). Description    CONTINUE: Warfarin 10 mg daily except 5 mg on Monday and Fridays    Call [phone] with signs or symptoms of bleeding or ANY medication changes (including over-the-counter medications or herbal supplements).        If significant bleeding occurs please seek immediate medical attention. Keep the number of servings of vitamin K containing foods (dark green, leafy vegetables) the same each week. Please call if this changes. Salad every other day and a green vegetable most days of the week. He does not eat dark green leafy vegetables. Limit alcohol intake. He drinks a glass of wine most evenings. Please call if this changes. Immunization History   Administered Date(s) Administered    COVID-19, Pfizer Purple top, DILUTE for use, 12+ yrs, 30mcg/0.3mL dose 01/20/2021, 02/10/2021, 10/06/2021    Influenza, High Dose (Fluzone 65 yrs and older) 09/25/2020    Influenza, Quadv, adjuvanted, 65 yrs +, IM, PF (Fluad) 10/06/2021    Zoster Recombinant (Shingrix) 09/22/2020             Reviewed AVS with patient / caregiver.       CLINICAL PHARMACY CONSULT: MED RECONCILIATION/REVIEW ADDENDUM    For Pharmacy Admin Tracking Only     Intervention Detail:    Total # of Interventions Recommended: 0   Total # of Interventions Accepted: 0   Time Spent (min): 20

## 2022-03-02 ENCOUNTER — ANTI-COAG VISIT (OUTPATIENT)
Dept: PHARMACY | Age: 87
End: 2022-03-02
Payer: MEDICARE

## 2022-03-02 DIAGNOSIS — Z79.01 ANTICOAGULATED ON COUMADIN: Primary | ICD-10-CM

## 2022-03-02 DIAGNOSIS — Z95.2 HX OF ARTIFICIAL HEART VALVE REPLACEMENT: ICD-10-CM

## 2022-03-02 LAB — INR BLD: 3.3

## 2022-03-02 PROCEDURE — 99211 OFF/OP EST MAY X REQ PHY/QHP: CPT

## 2022-03-02 PROCEDURE — 85610 PROTHROMBIN TIME: CPT

## 2022-03-02 NOTE — PROGRESS NOTES
Leti Manzo is a 80 y.o. here for warfarin management. Carmella Cannon had an INR test today. Results were reviewed and appropriate warfarin management was completed. This visit was performed as: An in person visit. Protocols were followed with precautions to reduce the spread of COVID-19. Patient verifies current dosing regimen: Yes     Warfarin medication reviewed and updated on the patient 's home medication list: Yes   All other medications reviewed and updated on the patient 's home medication list: Yes     Lab Results   Component Value Date    INR 3.30 03/02/2022    INR 3.2 01/19/2022    INR 3.40 12/15/2021       Patient Findings     Negatives:  Signs/symptoms of bleeding, Change in medications, Change in diet/appetite, Bruising          Anticoagulation Summary  As of 3/2/2022    INR goal:  2.5-3.5   TTR:  73.3 % (1.1 y)   INR used for dosing:  3.30 (3/2/2022)   Warfarin maintenance plan:  5 mg (10 mg x 0.5) every Mon, Fri; 10 mg (10 mg x 1) all other days   Weekly warfarin total:  60 mg   Plan last modified:  Mel Rock RPH (4/16/2021)   Next INR check:  4/13/2022   Priority:  Maintenance   Target end date: Indefinite    Indications    Anticoagulated on Coumadin [Z79.01]  Hx of artificial heart valve replacement [Z95.2]             Anticoagulation Episode Summary     INR check location:      Preferred lab:      Send INR reminders to:  [facility]    Comments:  [facility]      Anticoagulation Care Providers     Provider Role Specialty Phone number    Severa Juniper, DO Referring Family Medicine [phone]          Warfarin assessment / plan:     Appears well. No changes. No acute findings. No change to warfarin therapy today. Description    CONTINUE: Warfarin 10 mg daily except 5 mg on Monday and Fridays    Call [phone] with signs or symptoms of bleeding or ANY medication changes (including over-the-counter medications or herbal supplements).        If

## 2022-03-25 RX ORDER — WARFARIN SODIUM 10 MG/1
TABLET ORAL
Qty: 30 TABLET | Refills: 9 | Status: SHIPPED | OUTPATIENT
Start: 2022-03-25 | End: 2022-10-12 | Stop reason: SDUPTHER

## 2022-03-25 NOTE — TELEPHONE ENCOUNTER
----- Message from Verito Korinas sent at 3/25/2022  9:41 AM EDT -----  Subject: Refill Request    QUESTIONS  Name of Medication? warfarin (COUMADIN) 10 MG tablet  Patient-reported dosage and instructions? 10 mg 5 days a week and 5mg 2   days a week  How many days do you have left? 14  Preferred Pharmacy? [id number] phone number (if available)? [phone]  Additional Information for Provider? Pt is requesting a 90 day supply with   3 refills of meds. Pt has a few weeks of rx left. Please send rx to   pharmacy for pt.   ---------------------------------------------------------------------------  --------------  [address]  What is the best way for the office to contact you? OK to leave message on   voicemail  Preferred Call Back Phone Number?  [phone]

## 2022-04-06 ENCOUNTER — OFFICE VISIT (OUTPATIENT)
Dept: FAMILY MEDICINE CLINIC | Age: 87
End: 2022-04-06
Payer: MEDICARE

## 2022-04-06 VITALS
WEIGHT: 169.6 LBS | OXYGEN SATURATION: 95 % | DIASTOLIC BLOOD PRESSURE: 64 MMHG | HEART RATE: 52 BPM | SYSTOLIC BLOOD PRESSURE: 122 MMHG | BODY MASS INDEX: 24.34 KG/M2

## 2022-04-06 DIAGNOSIS — E78.5 HYPERLIPIDEMIA, UNSPECIFIED HYPERLIPIDEMIA TYPE: ICD-10-CM

## 2022-04-06 DIAGNOSIS — G20 PARKINSON DISEASE (HCC): Primary | ICD-10-CM

## 2022-04-06 DIAGNOSIS — Z79.01 ANTICOAGULATED ON COUMADIN: ICD-10-CM

## 2022-04-06 PROCEDURE — 99214 OFFICE O/P EST MOD 30 MIN: CPT | Performed by: FAMILY MEDICINE

## 2022-04-06 NOTE — PROGRESS NOTES
2022     Romayne Row (:  1934) is a 80 y.o. male, here for evaluation of the following medical concerns:    HPI  1.  Aortic valve- caumadin 10 mg  four times a week, 5 mg three times a week, last INR was wnl, he is wanting it checked today, recently saw his new  Cardiologist, feels well and doesn't have a concern today. He is being managed by the Coumadin clinic.      2.  Parkinsons disease- tremors at rest, ambulation is wnl, follows with Neurology at [facility], feels it is well controlled on the medication, denied a side effect from the medication, takes it as prescribed.     \"1. 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 He has mild wearing off with return of tremor. Impaired sleep maintenance likely represents sub-optimal dopamine coverage overnight. \"     Today, denied chest pain, sob, n, v, or diarrhea.   Review of Systems   Constitutional: Negative for activity change, fatigue, fever and unexpected weight change. HENT: Negative for congestion, ear pain, rhinorrhea, sinus pressure, sore throat and trouble swallowing. Respiratory: Negative for cough, shortness of breath and wheezing. Cardiovascular: Negative for chest pain, palpitations and leg swelling. Gastrointestinal: Negative for abdominal pain, anal bleeding, diarrhea, nausea and vomiting. Musculoskeletal: Positive for arthralgias. Skin: Negative for rash. Neurological: Positive for tremors and weakness. Negative for dizziness, syncope, light-headedness and headaches. Psychiatric/Behavioral: Negative for dysphoric mood. The patient is not nervous/anxious. Prior to Visit Medications    Medication Sig Taking?  Authorizing Provider   warfarin (COUMADIN) 10 MG tablet TAKE 1 TABLET DAILY EXCEPT 5 MG EVERY MONDAY AND FRIDAY OR AS DIRECTED BY [facility] [phone] Yes Gene Goodell, DO   metoprolol succinate (TOPROL XL) 25 MG extended release tablet TAKE 1 TABLET NIGHTLY Yes Tereso JACKSON Scott, DO   carbidopa-levodopa (SINEMET)  MG per tablet Take 2 tablets by mouth 4 times daily Yes Gene Goodell, DO   aspirin 81 MG tablet Take 81 mg by mouth daily Yes Historical Provider, MD        Social History     Tobacco Use    Smoking status: Never Smoker    Smokeless tobacco: Never Used   Substance Use Topics    Alcohol use: Yes     Alcohol/week: 1.0 standard drink     Types: 1 Glasses of wine per week     Comment: wine weekly        Vitals:    04/06/22 0944   BP: 122/64   Pulse: 52   SpO2: 95%   Weight: 169 lb 9.6 oz (76.9 kg)     Estimated body mass index is 24.34 kg/m² as calculated from the following:    Height as of 12/3/21: 5' 10\" (1.778 m). Weight as of this encounter: 169 lb 9.6 oz (76.9 kg). Physical Exam  Vitals and nursing note reviewed. Constitutional:       Appearance: He is well-developed. HENT:      Head: Normocephalic and atraumatic. Right Ear: Tympanic membrane, ear canal and external ear normal.      Left Ear: Tympanic membrane, ear canal and external ear normal.   Eyes:      Pupils: Pupils are equal, round, and reactive to light. Neck:      Thyroid: No thyromegaly. Cardiovascular:      Rate and Rhythm: Normal rate and regular rhythm. Heart sounds: No murmur heard. Pulmonary:      Effort: Pulmonary effort is normal.      Breath sounds: Normal breath sounds. No wheezing or rales. Abdominal:      General: Bowel sounds are normal.      Palpations: Abdomen is soft. Tenderness: There is no abdominal tenderness. Musculoskeletal:         General: Normal range of motion. Skin:     Findings: No rash. Neurological:      Mental Status: He is alert and oriented to person, place, and time. Psychiatric:         Behavior: Behavior normal.         Judgment: Judgment normal.         ASSESSMENT/PLAN:  1. Parkinson disease ([facility] [address]  Stable  Continue with medication  Keep appointments with specialist.   Devon Gama questions  - CBC; Future  - Comprehensive Metabolic Panel; Future  - Lipid Panel; Future    2. Anticoagulated on Coumadin  Stable  Continue with medication  Keep appointments with specialist.   Answered questions  - CBC; Future  - Comprehensive Metabolic Panel; Future  - Lipid Panel; Future    3. Hyperlipidemia, unspecified hyperlipidemia type  Take medication as prescribed. Discussed the need to exercise 30 minutes each day. Monitor diet, avoid fried foods etc... Educated on need to reduce cholesterol in diet and results of poor diet. No follow-ups on file.

## 2022-04-10 ASSESSMENT — ENCOUNTER SYMPTOMS
ANAL BLEEDING: 0
VOMITING: 0
SINUS PRESSURE: 0
DIARRHEA: 0
ABDOMINAL PAIN: 0
SHORTNESS OF BREATH: 0
RHINORRHEA: 0
TROUBLE SWALLOWING: 0
NAUSEA: 0
COUGH: 0
WHEEZING: 0
SORE THROAT: 0

## 2022-04-13 ENCOUNTER — ANTI-COAG VISIT (OUTPATIENT)
Dept: PHARMACY | Age: 87
End: 2022-04-13
Payer: MEDICARE

## 2022-04-13 LAB — INTERNATIONAL NORMALIZATION RATIO, POC: 2.2

## 2022-04-13 PROCEDURE — 99211 OFF/OP EST MAY X REQ PHY/QHP: CPT

## 2022-04-13 PROCEDURE — 85610 PROTHROMBIN TIME: CPT

## 2022-04-13 NOTE — PROGRESS NOTES
Osvaldo Quintero is a 80 y.o. here for warfarin management. Erika Nava had an INR test today. Results were reviewed and appropriate warfarin management was completed. This visit was performed as: An in person visit. Protocols were followed with precautions to reduce the spread of COVID-19. Patient verifies current dosing regimen: Yes     Warfarin medication reviewed and updated on the patient 's home medication list: Yes   All other medications reviewed and updated on the patient 's home medication list: No: no changes     Lab Results   Component Value Date    INR 2.2 2022    INR 3.30 2022    INR 3.2 2022       Patient Findings     Positives:  Change in diet/appetite    Comments: Had more green vegetables over the past few days          Anticoagulation Summary  As of 2022    INR goal:  2.5-3.5   TTR:  73.3 % (1.3 y)   INR used for dosin.2 (2022)   Warfarin maintenance plan:  5 mg (10 mg x 0.5) every Mon, Fri; 10 mg (10 mg x 1) all other days   Weekly warfarin total:  60 mg   Plan last modified:  Mecca Holden, 1928 [facility] (2021)   Next INR check:  2022   Priority:  Maintenance   Target end date: Indefinite    Indications    Anticoagulated on Coumadin [Z79.01]  Hx of artificial heart valve replacement [Z95.2]             Anticoagulation Episode Summary     INR check location:      Preferred lab:      Send INR reminders to:  [facility]    Comments:  [facility]      Anticoagulation Care Providers     Provider Role Specialty Phone number    Patience Duckworth DO Referring Family Medicine [phone]          Warfarin assessment / plan:   He looks and feels well today. No changes in meds noted. He denies any missed warfarin doses. He did say that he has been eating more green vegetables than usual. This may account for the subtherapeutic INR of 2.2 today. I stressed the importance of keeping his vitamin K intake consistent.  We will have him take 15mg of warfarin today and the resume his previous dosing. He has been quite stable over recent months. He will return in 6 weeks, per MD order. Description    Take warfarin 15mg(1 & 1/2 tablets) TODAY ONLY(4-13 ) then  CONTINUE: Warfarin 10 mg daily except 5 mg on Monday and Fridays    Call [phone] with signs or symptoms of bleeding or ANY medication changes (including over-the-counter medications or herbal supplements). If significant bleeding occurs please seek immediate medical attention. Keep the number of servings of vitamin K containing foods (dark green, leafy vegetables) the same each week. Please call if this changes. Salad every other day and a green vegetable most days of the week. He does not eat dark green leafy vegetables. Limit alcohol intake. He drinks a glass of wine most evenings. Please call if this changes. Immunization History   Administered Date(s) Administered    COVID-19, Pfizer Purple top, DILUTE for use, 12+ yrs, 30mcg/0.3mL dose 01/20/2021, 02/10/2021, 10/06/2021    Influenza, High Dose (Fluzone 65 yrs and older) 09/25/2020    Influenza, Quadv, adjuvanted, 65 yrs +, IM, PF (Fluad) 10/06/2021    Zoster Recombinant (Shingrix) 09/22/2020           Orders Placed This Encounter   Procedures    POCT INR     This external order was created through the results console. No orders of the defined types were placed in this encounter. Reviewed AVS with patient / caregiver.     Billing Points:  Adjust dosage and/or reconcile meds (fill pill box) </= 5 medications - 2 points       CLINICAL PHARMACY CONSULT: MED RECONCILIATION/REVIEW ADDENDUM    For Pharmacy Admin Tracking Only     Intervention Detail: Dose Adjustment: 1, reason: Therapy Optimization   Total # of Interventions Recommended: 1   Total # of Interventions Accepted: 1   Time Spent (min): 20

## 2022-04-21 ENCOUNTER — OFFICE VISIT (OUTPATIENT)
Dept: CARDIOLOGY CLINIC | Age: 87
End: 2022-04-21
Payer: MEDICARE

## 2022-04-21 VITALS
HEART RATE: 81 BPM | OXYGEN SATURATION: 90 % | HEIGHT: 70 IN | WEIGHT: 170 LBS | DIASTOLIC BLOOD PRESSURE: 86 MMHG | BODY MASS INDEX: 24.34 KG/M2 | SYSTOLIC BLOOD PRESSURE: 128 MMHG

## 2022-04-21 DIAGNOSIS — Z95.2 HX OF ARTIFICIAL HEART VALVE REPLACEMENT: Primary | ICD-10-CM

## 2022-04-21 DIAGNOSIS — R06.09 DOE (DYSPNEA ON EXERTION): ICD-10-CM

## 2022-04-21 DIAGNOSIS — I10 ESSENTIAL HYPERTENSION: ICD-10-CM

## 2022-04-21 PROCEDURE — 93000 ELECTROCARDIOGRAM COMPLETE: CPT | Performed by: INTERNAL MEDICINE

## 2022-04-21 PROCEDURE — 99214 OFFICE O/P EST MOD 30 MIN: CPT | Performed by: INTERNAL MEDICINE

## 2022-04-21 RX ORDER — CARBIDOPA AND LEVODOPA 50; 200 MG/1; MG/1
2 TABLET, EXTENDED RELEASE ORAL NIGHTLY
COMMUNITY

## 2022-04-21 NOTE — PROGRESS NOTES
[facility]  Cardiology Consult    Oz Richmond  1934 April 21, 2022    PCP: Dr. Spencer Lagunas   Former Cardiologist: Dr. Kaleb Antony     Reason for Referral: hx of AVR 1991    CC: \"I have some shortness of breath. \"       Subjective:     History of Present Illness:    Oz Richmond is a 80 y.o. patient with a PMH significant for Parkinson's, HTN, mechanical AVR 1991 in [address] [place] [address] on Coumadin (PCP managed), Aspirin, Toprol-xl  therapy. Today, he is here for follow up. He continues to take warfarin and his INR has been in normal range. He has been getting shortness of breath with exertion. He says that he is blaming that on not doing anything over the winter. He does attribute the shortness of breath with deconditioning. Patient denies exertional chest pain/pressure, dyspnea at rest,PND, orthopnea, palpitations, lightheadedness, weight changes, changes in LE edema, and syncope. Past Medical History:   has a past medical history of Colon polyps, Mechanical heart valve present, and S/P aortic valve replacement with metallic valve. Surgical History:   has a past surgical history that includes Aortic valve replacement; Colonoscopy (2008); Colonoscopy (8-); eye surgery; and Colonoscopy (2018). Social History:   reports that he has never smoked. He has never used smokeless tobacco. He reports current alcohol use of about 1.0 standard drink of alcohol per week. He reports that he does not use drugs. Family History:  family history includes Breast Cancer in his mother and sister; Cancer in his mother; Heart Disease in his mother; Parkinsonism in his father; Prostate Cancer in his maternal grandfather; Stomach Cancer in his paternal grandfather. Home Medications:  Were reviewed and are listed in nursing record and/or below  Prior to Admission medications    Medication Sig Start Date End Date Taking?  Authorizing Provider   carbidopa-levodopa (SINEMET CR)  MG per extended release tablet Take 2 tablets by mouth nightly   Yes Historical Provider, MD   warfarin (COUMADIN) 10 MG tablet TAKE 1 TABLET DAILY EXCEPT 5 MG EVERY MONDAY AND FRIDAY OR AS DIRECTED BY [facility] [phone] 3/25/22  Yes Noah Dunbar DO   metoprolol succinate (TOPROL XL) 25 MG extended release tablet TAKE 1 TABLET NIGHTLY 6/15/21  Yes Noah Dunbar DO   carbidopa-levodopa (SINEMET)  MG per tablet Take 2 tablets by mouth 4 times daily 6/18/20  Yes Noah Dunbar DO   aspirin 81 MG tablet Take 81 mg by mouth daily   Yes Historical Provider, MD        CURRENT Medications:  No current facility-administered medications for this visit. Allergies:  Amlodipine and Codeine           Review of Systems: SEE HPI   · Constitutional: no unanticipated weight loss. There's been no change in energy level, sleep pattern, or activity level. No fevers, chills. · Eyes: No visual changes or diplopia. No scleral icterus. · ENT: No Headaches, hearing loss or vertigo. No mouth sores or sore throat. · Cardiovascular: No Chest pain, tightness or discomfort.  No Shortness of breath. No Dyspnea on exertion, Orthopnea, Paroxysmal nocturnal dyspnea or breathlessness at rest.   No Palpitations.  No Syncope ('blackouts', 'faints', 'collapse') or dizziness. · Respiratory: No cough or wheezing, no sputum production. No hematemesis. · Gastrointestinal: No abdominal pain, appetite loss, blood in stools. No change in bowel or bladder habits. · Genitourinary: No dysuria, trouble voiding, or hematuria. · Musculoskeletal:  No gait disturbance, no joint complaints. · Integumentary: No rash or pruritis. · Neurological: No headache, diplopia, change in muscle strength, numbness or tingling. · Psychiatric: No anxiety or depression. · Endocrine: No temperature intolerance. No excessive thirst, fluid intake, or urination. No tremor.   · Hematologic/Lymphatic: No abnormal bruising or bleeding, blood clots or swollen lymph nodes. · Allergic/Immunologic: No nasal congestion or hives. Objective:     PHYSICAL EXAM:      Vitals:    04/21/22 1408   BP: 128/86   Pulse: 81   SpO2: 90%   Weight: 170 lb (77.1 kg)   Height: 5' 10\" (1.778 m)      Weight: 170 lb (77.1 kg)       General Appearance:  Alert, cooperative, no distress, appears stated age. Head:  Normocephalic, without obvious abnormality, atraumatic. Eyes:  Pupils equal and round. No scleral icterus. Mouth: Moist mucosa, no pharyngeal erythema. Nose: Nares normal. No drainage or sinus tenderness. Neck: Supple, symmetrical, trachea midline. No adenopathy. No tenderness/mass/nodules. No carotid bruit or elevated JVD. Lungs:   Respiratory Effort: Normal   Auscultation: Clear to auscultation bilaterally, respirations unlabored. No wheeze, rales   Chest Wall:  No tenderness or deformity. Cardiovascular:    Pulses  Palpation: normal   Ascultation: Regular rate, mechanical Aortic click  M1/ S2 normal. No murmur, rub, or gallop. 2+ radial and pedal pulses, symmetric  Carotid  Femoral   Abdomen and Gastrointestinal:   Soft, non-tender, bowel sounds active. Liver and Spleen  Masses   Musculoskeletal: No muscle wasting  Back  Gait   Extremities: Extremities normal, atraumatic. No cyanosis or edema. No cyanosis clubbing       Skin: Inspection and palpation performed, no rashes or lesions. Pysch: Normal mood and affect.  Alert and oriented to time place person   Neurologic: Normal gross motor and sensory exam.       Labs     All labs have been reviewed    Lab Results   Component Value Date    WBC 8.2 04/14/2021    RBC 5.10 04/14/2021    HGB 15.6 04/14/2021    HCT 47.7 04/14/2021    MCV 93.6 04/14/2021    RDW 15.6 04/14/2021     04/14/2021     Lab Results   Component Value Date     04/14/2021    K 5.0 04/14/2021    K 4.1 10/20/2019     04/14/2021    CO2 25 04/14/2021    BUN 14 04/14/2021    CREATININE 0.8 04/14/2021    GFRAA >60 2021    AGRATIO 2.1 2021    LABGLOM >60 2021    GLUCOSE 75 2021    PROT 6.6 2021    CALCIUM 9.5 2021    BILITOT 0.8 2021    ALKPHOS 53 2021    AST 27 2021    ALT <5 2021     No results found for: PTINR  No results found for: LABA1C  No results found for: CKTOTAL, CKMB, CKMBINDEX, TROPONINI    Cardiac, Vascular and Imaging Data all Personally Reviewed in Detail by Myself      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    Moderate concentric left ventricular hypertrophy. Abnormal (paradoxical) septal motion consistent with postoperative state.    The base of the inferior wall is hypokinetic   Left ventricular ejection fraction is in the normal range.    Mild left atrial dilatation. Mechanical prosthetic aortic valve which is well seated   Mildly dilated proximal ascending aorta     ECHO 2020  Concentric LVH with normal LV size and wall motion. EF is   55%. Grade I  diastolic dysfunction with normal LV filling pressures. The left atrium is mildly dilated. A mechanical artificial aortic valve appears well seated with a mean  gradient of 17 mmHg. The right ventricle is normal in size and function. A bubble study was performed and shows evidence of right to left shunting    ECHO 2021  Left ventricular cavity size is normal.  There is asymmetric hypertrophy of the basal septum. Ejection fraction is visually estimated to be 55-60%. No regional wall motion abnormalities are noted. Indeterminate diastolic function. Normal right ventricular size and function. Right ventricular systolic function is normal.    Stress Test: none     Cath: none     Other imaging:     Assessment and Plan     Mechanical AVR  in [facility] on Coumadin (PCP managed). Dyspnea with exertion. Repeat echocardiogram yearly. Hypertension  Controlled. Continue current medical management. Dyspnea  With exertion. Will order a nuclear stress test to assess this. Follow up in 1 year. Thank you for allowing us to participate in the care of Carmella Johnston. Please do not hesitate to contact me if you have any questions. Tito Neal MD, MPH    [facility], [facility], [facility]  Ph: [phone]  Fax: [phone]      This note was scribed in the presence of Dr Omkar Barba, by Anthony Wong RN  Physician Attestation:  The scribes documentation has been prepared under my direction and personally reviewed by me in its entirety. I confirm that the note above accurately reflects all work, treatment, procedures, and medical decision making performed by me.

## 2022-04-21 NOTE — PATIENT INSTRUCTIONS
Patient Education        Heart-Healthy Diet: Care Instructions  Your Care Instructions     A heart-healthy diet has lots of vegetables, fruits, nuts, beans, and whole grains, and is low in salt. It limits foods that are high in saturated fat, such as meats, cheeses, and fried foods. It may be hard to change your diet,but even small changes can lower your risk of heart attack and heart disease. Follow-up care is a key part of your treatment and safety. Be sure to make and go to all appointments, and call your doctor if you are having problems. It's also a good idea to know your test results and keep alist of the medicines you take. How can you care for yourself at home? Watch your portions   Use food labels to learn what the recommended servings are for the foods you eat.  Eat only the number of calories you need to stay at a healthy weight. If you need to lose weight, eat fewer calories than your body burns (through exercise and other physical activity). Eat more fruits and vegetables   Eat a variety of fruit and vegetables every day. Dark green, deep orange, red, or yellow fruits and vegetables are especially good for you. Examples include spinach, carrots, peaches, and berries.  Keep carrots, celery, and other veggies handy for snacks. Buy fruit that is in season and store it where you can see it so that you will be tempted to eat it.  Cook dishes that have a lot of veggies in them, such as stir-fries and soups. Limit saturated fat   Read food labels, and try to avoid saturated fats. They increase your risk of heart disease.  Use olive or canola oil when you cook.  Bake, broil, grill, or steam foods instead of frying them.  Choose lean meats instead of high-fat meats such as hot dogs and sausages. Cut off all visible fat when you prepare meat.  Eat fish, skinless poultry, and meat alternatives such as soy products instead of high-fat meats.  Soy products, such as tofu, may be especially good for your heart.  Choose low-fat or fat-free milk and dairy products. Eat foods high in fiber   Eat a variety of grain products every day. Include whole-grain foods that have lots of fiber and nutrients. Examples of whole-grain foods include oats, whole wheat bread, and brown rice.  Buy whole-grain breads and cereals, instead of white bread or pastries. Limit salt and sodium   Limit how much salt and sodium you eat to help lower your blood pressure.  Taste food before you salt it. Add only a little salt when you think you need it. With time, your taste buds will adjust to less salt.  Eat fewer snack items, fast foods, and other high-salt, processed foods. Check food labels for the amount of sodium in packaged foods.  Choose low-sodium versions of canned goods (such as soups, vegetables, and beans). Limit sugar   Limit drinks and foods with added sugar. These include candy, desserts, and soda pop. Limit alcohol   Limit alcohol to no more than 2 drinks a day for men and 1 drink a day for women. Too much alcohol can cause health problems. When should you call for help? Watch closely for changes in your health, and be sure to contact your doctor if:     You would like help planning heart-healthy meals. Where can you learn more? Go to https://[employer] Warmongerseb.healthRackWare. org and sign in to your [employer]Got[employer]uff account. Enter V137 in the [facility] box to learn more about \"Heart-Healthy Diet: Care Instructions. \"     If you do not have an account, please click on the \"Sign Up Now\" link. Current as of: September 8, 2021               Content Version: 13.2  © 1865-1501 Healthwise, Incorporated. Care instructions adapted under license by [facility] ([facility]). If you have questions about a medical condition or this instruction, always ask your healthcare professional. [place] [address] any warranty or liability for your use of this information.

## 2022-04-27 DIAGNOSIS — G20 PARKINSON DISEASE (HCC): ICD-10-CM

## 2022-04-27 DIAGNOSIS — Z79.01 ANTICOAGULATED ON COUMADIN: ICD-10-CM

## 2022-04-27 LAB
A/G RATIO: 2.1 (ref 1.1–2.2)
ALBUMIN SERPL-MCNC: 4.4 G/DL (ref 3.4–5)
ALP BLD-CCNC: 48 U/L (ref 40–129)
ALT SERPL-CCNC: <5 U/L (ref 10–40)
ANION GAP SERPL CALCULATED.3IONS-SCNC: 9 MMOL/L (ref 3–16)
AST SERPL-CCNC: 22 U/L (ref 15–37)
BILIRUB SERPL-MCNC: 0.7 MG/DL (ref 0–1)
BUN BLDV-MCNC: 15 MG/DL (ref 7–20)
CALCIUM SERPL-MCNC: 9.4 MG/DL (ref 8.3–10.6)
CHLORIDE BLD-SCNC: 104 MMOL/L (ref 99–110)
CHOLESTEROL, TOTAL: 139 MG/DL (ref 0–199)
CO2: 26 MMOL/L (ref 21–32)
CREAT SERPL-MCNC: 0.8 MG/DL (ref 0.8–1.3)
GFR AFRICAN AMERICAN: >60
GFR NON-AFRICAN AMERICAN: >60
GLUCOSE BLD-MCNC: 69 MG/DL (ref 70–99)
HCT VFR BLD CALC: 44.5 % (ref 40.5–52.5)
HDLC SERPL-MCNC: 58 MG/DL (ref 40–60)
HEMOGLOBIN: 14.6 G/DL (ref 13.5–17.5)
LDL CHOLESTEROL CALCULATED: 51 MG/DL
MCH RBC QN AUTO: 30.2 PG (ref 26–34)
MCHC RBC AUTO-ENTMCNC: 32.9 G/DL (ref 31–36)
MCV RBC AUTO: 91.8 FL (ref 80–100)
PDW BLD-RTO: 15 % (ref 12.4–15.4)
PLATELET # BLD: 232 K/UL (ref 135–450)
PMV BLD AUTO: 8.4 FL (ref 5–10.5)
POTASSIUM SERPL-SCNC: 5 MMOL/L (ref 3.5–5.1)
RBC # BLD: 4.84 M/UL (ref 4.2–5.9)
SODIUM BLD-SCNC: 139 MMOL/L (ref 136–145)
TOTAL PROTEIN: 6.5 G/DL (ref 6.4–8.2)
TRIGL SERPL-MCNC: 152 MG/DL (ref 0–150)
VLDLC SERPL CALC-MCNC: 30 MG/DL
WBC # BLD: 5.6 K/UL (ref 4–11)

## 2022-05-02 RX ORDER — METOPROLOL SUCCINATE 25 MG/1
TABLET, EXTENDED RELEASE ORAL
Qty: 90 TABLET | Refills: 3 | Status: SHIPPED | OUTPATIENT
Start: 2022-05-02

## 2022-05-02 NOTE — TELEPHONE ENCOUNTER
Last office visit 4/6/2022     Last written     Next office visit scheduled 7/6/2022    Requested Prescriptions     Pending Prescriptions Disp Refills    metoprolol succinate (TOPROL XL) 25 MG extended release tablet [Pharmacy Med Name: METOPROLOL SUCCINATE ER TABS 25MG] 90 tablet 3     Sig: TAKE 1 TABLET NIGHTLY

## 2022-05-03 ENCOUNTER — HOSPITAL ENCOUNTER (OUTPATIENT)
Dept: NON INVASIVE DIAGNOSTICS | Age: 87
Discharge: HOME | End: 2022-05-03
Payer: MEDICARE

## 2022-05-03 DIAGNOSIS — R06.09 DOE (DYSPNEA ON EXERTION): ICD-10-CM

## 2022-05-03 DIAGNOSIS — Z95.2 HX OF ARTIFICIAL HEART VALVE REPLACEMENT: ICD-10-CM

## 2022-05-03 PROCEDURE — 78452 HT MUSCLE IMAGE SPECT MULT: CPT

## 2022-05-03 PROCEDURE — 93017 CV STRESS TEST TRACING ONLY: CPT

## 2022-05-03 PROCEDURE — 3430000000 HC RX DIAGNOSTIC RADIOPHARMACEUTICAL: Performed by: INTERNAL MEDICINE

## 2022-05-03 PROCEDURE — 6360000002 HC RX W HCPCS: Performed by: INTERNAL MEDICINE

## 2022-05-03 PROCEDURE — A9502 TC99M TETROFOSMIN: HCPCS | Performed by: INTERNAL MEDICINE

## 2022-05-03 RX ADMIN — TETROFOSMIN 10 MILLICURIE: 1.38 INJECTION, POWDER, LYOPHILIZED, FOR SOLUTION INTRAVENOUS at 11:12

## 2022-05-03 RX ADMIN — TETROFOSMIN 30 MILLICURIE: 1.38 INJECTION, POWDER, LYOPHILIZED, FOR SOLUTION INTRAVENOUS at 12:26

## 2022-05-03 RX ADMIN — REGADENOSON 0.4 MG: 0.08 INJECTION, SOLUTION INTRAVENOUS at 12:26

## 2022-05-04 ENCOUNTER — TELEPHONE (OUTPATIENT)
Dept: CARDIOLOGY CLINIC | Age: 87
End: 2022-05-04

## 2022-05-04 DIAGNOSIS — Z95.2 HX OF ARTIFICIAL HEART VALVE REPLACEMENT: ICD-10-CM

## 2022-05-04 DIAGNOSIS — R94.39 ABNORMAL STRESS TEST: Primary | ICD-10-CM

## 2022-05-04 NOTE — TELEPHONE ENCOUNTER
Pt is agreeable to date/time. Went over pre-procedure instructions. Pt v/u. Published on [employer] and e-mail to Alan Morales. Notified pt that we may need to push back due to insurance. He v/u. Procedure is scheduled for 5/9/22 at 1 pm with a 11:30 arrival time. The morning of your procedure you will park in the hospital parking lot and report directly to the cath lab to check in.      Pre-Procedure Instructions   1. You will need to fast for at least 8 hours prior to procedure. No caffeine the morning of.   2. You will need to hold your anticoagulation, warfarin for 2 days prior. 3. All other medications can be taken in the morning with sips of water. 4. You will need to take 325 mg aspirin the morning of. If you are currently taking 81 mg please take 4 tablets that morning. 5. Do not use any lotions, creams or perfume the morning of procedure. 6. Pre-procedure lab work will need to be completed 5-7 days prior to procedure. 7. Please have a responsible adult to drive you home after procedure. We advise you have someone to stay with you for 24 hours following procedure for precautionary measures. Depending on procedure you may require an overnight stay.    8. Cath lab will provide you with all post procedure instructions.      If you have any questions regarding the procedure itself or medications, please call [phone] and ask to speak with a nurse.

## 2022-05-04 NOTE — TELEPHONE ENCOUNTER
Patient had abnormal stress test and is agreeable to proceed with [facility]. Please call to scheduled. The morning of your procedure you will park in the hospital parking lot and report directly to the cath lab to check in.     Pre-Procedure Instructions   1. You will need to fast for at least 8 hours prior to procedure. No caffeine the morning of.   2. You will need to hold your anticoagulation, warfarin for 2 days prior. 3. All other medications can be taken in the morning with sips of water. 4. You will need to take 325 mg aspirin the morning of. If you are currently taking 81 mg please take 4 tablets that morning. 5. Do not use any lotions, creams or perfume the morning of procedure. 6. Pre-procedure lab work will need to be completed 5-7 days prior to procedure. 7. Please have a responsible adult to drive you home after procedure. We advise you have someone to stay with you for 24 hours following procedure for precautionary measures. Depending on procedure you may require an overnight stay. 8. Cath lab will provide you with all post procedure instructions. If you have any questions regarding the procedure itself or medications, please call [phone] and ask to speak with a nurse.

## 2022-05-05 NOTE — TELEPHONE ENCOUNTER
Pt was informed that procedure was approved through his insurance. We went over pre-procedure instructions again. He v/u.

## 2022-05-05 NOTE — TELEPHONE ENCOUNTER
Will await to see if insurance approves, if not will need to be pushed out. Pt is agreeable to both.

## 2022-05-09 ENCOUNTER — HOSPITAL ENCOUNTER (OUTPATIENT)
Dept: CARDIAC CATH/INVASIVE PROCEDURES | Age: 87
Discharge: HOME | End: 2022-05-09
Attending: INTERNAL MEDICINE | Admitting: INTERNAL MEDICINE
Payer: MEDICARE

## 2022-05-09 VITALS
HEART RATE: 62 BPM | RESPIRATION RATE: 16 BRPM | BODY MASS INDEX: 24.34 KG/M2 | OXYGEN SATURATION: 100 % | DIASTOLIC BLOOD PRESSURE: 78 MMHG | SYSTOLIC BLOOD PRESSURE: 164 MMHG | HEIGHT: 70 IN | WEIGHT: 170 LBS

## 2022-05-09 LAB
INR BLD: 1.33 (ref 0.88–1.12)
PROTHROMBIN TIME: 15.2 SEC (ref 9.9–12.7)

## 2022-05-09 PROCEDURE — C1894 INTRO/SHEATH, NON-LASER: HCPCS

## 2022-05-09 PROCEDURE — C1769 GUIDE WIRE: HCPCS

## 2022-05-09 PROCEDURE — 85347 COAGULATION TIME ACTIVATED: CPT

## 2022-05-09 PROCEDURE — 99153 MOD SED SAME PHYS/QHP EA: CPT

## 2022-05-09 PROCEDURE — 6360000002 HC RX W HCPCS

## 2022-05-09 PROCEDURE — 85610 PROTHROMBIN TIME: CPT

## 2022-05-09 PROCEDURE — C1725 CATH, TRANSLUMIN NON-LASER: HCPCS

## 2022-05-09 PROCEDURE — 93454 CORONARY ARTERY ANGIO S&I: CPT | Performed by: INTERNAL MEDICINE

## 2022-05-09 PROCEDURE — 99152 MOD SED SAME PHYS/QHP 5/>YRS: CPT | Performed by: INTERNAL MEDICINE

## 2022-05-09 PROCEDURE — 6360000004 HC RX CONTRAST MEDICATION: Performed by: INTERNAL MEDICINE

## 2022-05-09 PROCEDURE — 2709999900 HC NON-CHARGEABLE SUPPLY

## 2022-05-09 PROCEDURE — 99152 MOD SED SAME PHYS/QHP 5/>YRS: CPT

## 2022-05-09 PROCEDURE — 93458 L HRT ARTERY/VENTRICLE ANGIO: CPT

## 2022-05-09 PROCEDURE — 2500000003 HC RX 250 WO HCPCS

## 2022-05-09 PROCEDURE — 2580000003 HC RX 258

## 2022-05-09 PROCEDURE — C1887 CATHETER, GUIDING: HCPCS

## 2022-05-09 RX ORDER — SODIUM CHLORIDE 0.9 % (FLUSH) 0.9 %
5-40 SYRINGE (ML) INJECTION PRN
Status: DISCONTINUED | OUTPATIENT
Start: 2022-05-09 | End: 2022-05-10 | Stop reason: HOSPADM

## 2022-05-09 RX ORDER — SODIUM CHLORIDE 9 MG/ML
INJECTION, SOLUTION INTRAVENOUS PRN
Status: DISCONTINUED | OUTPATIENT
Start: 2022-05-09 | End: 2022-05-09 | Stop reason: ALTCHOICE

## 2022-05-09 RX ORDER — ONDANSETRON 2 MG/ML
4 INJECTION INTRAMUSCULAR; INTRAVENOUS EVERY 6 HOURS PRN
Status: DISCONTINUED | OUTPATIENT
Start: 2022-05-09 | End: 2022-05-10 | Stop reason: HOSPADM

## 2022-05-09 RX ORDER — SODIUM CHLORIDE 9 MG/ML
INJECTION, SOLUTION INTRAVENOUS PRN
Status: DISCONTINUED | OUTPATIENT
Start: 2022-05-09 | End: 2022-05-10 | Stop reason: HOSPADM

## 2022-05-09 RX ORDER — ACETAMINOPHEN 325 MG/1
650 TABLET ORAL EVERY 4 HOURS PRN
Status: DISCONTINUED | OUTPATIENT
Start: 2022-05-09 | End: 2022-05-10 | Stop reason: HOSPADM

## 2022-05-09 RX ORDER — DIPHENHYDRAMINE HYDROCHLORIDE 50 MG/ML
25 INJECTION INTRAMUSCULAR; INTRAVENOUS ONCE
Status: DISCONTINUED | OUTPATIENT
Start: 2022-05-09 | End: 2022-05-09 | Stop reason: ALTCHOICE

## 2022-05-09 RX ORDER — SODIUM CHLORIDE 0.9 % (FLUSH) 0.9 %
5-40 SYRINGE (ML) INJECTION EVERY 12 HOURS SCHEDULED
Status: DISCONTINUED | OUTPATIENT
Start: 2022-05-09 | End: 2022-05-10 | Stop reason: HOSPADM

## 2022-05-09 RX ORDER — SODIUM CHLORIDE 0.9 % (FLUSH) 0.9 %
5-40 SYRINGE (ML) INJECTION EVERY 12 HOURS SCHEDULED
Status: DISCONTINUED | OUTPATIENT
Start: 2022-05-09 | End: 2022-05-09 | Stop reason: ALTCHOICE

## 2022-05-09 RX ORDER — SODIUM CHLORIDE 9 MG/ML
INJECTION, SOLUTION INTRAVENOUS CONTINUOUS
Status: DISCONTINUED | OUTPATIENT
Start: 2022-05-09 | End: 2022-05-10 | Stop reason: HOSPADM

## 2022-05-09 RX ORDER — SODIUM CHLORIDE 0.9 % (FLUSH) 0.9 %
5-40 SYRINGE (ML) INJECTION PRN
Status: DISCONTINUED | OUTPATIENT
Start: 2022-05-09 | End: 2022-05-09 | Stop reason: ALTCHOICE

## 2022-05-09 RX ORDER — METHYLPREDNISOLONE SODIUM SUCCINATE 125 MG/2ML
125 INJECTION, POWDER, LYOPHILIZED, FOR SOLUTION INTRAMUSCULAR; INTRAVENOUS ONCE
Status: DISCONTINUED | OUTPATIENT
Start: 2022-05-09 | End: 2022-05-10 | Stop reason: HOSPADM

## 2022-05-09 RX ORDER — ASPIRIN 325 MG
325 TABLET ORAL ONCE
Status: DISCONTINUED | OUTPATIENT
Start: 2022-05-09 | End: 2022-05-09 | Stop reason: ALTCHOICE

## 2022-05-09 RX ADMIN — IOPAMIDOL 125 ML: 755 INJECTION, SOLUTION INTRAVENOUS at 14:20

## 2022-05-09 NOTE — H&P
Reason for Referral: hx of AVR 1991     CC: \"I have some shortness of breath. \"         Subjective:      History of Present Illness:     Clay Bailey is a 80 y.o. patient with a PMH significant for Parkinson's, HTN, mechanical AVR 1991 in [address] [place] [address] on Coumadin (PCP managed), Aspirin, Toprol-xl  therapy.      Today, he is here for follow up. He continues to take warfarin and his INR has been in normal range. He has been getting shortness of breath with exertion. He says that he is blaming that on not doing anything over the winter. He does attribute the shortness of breath with deconditioning. Patient denies exertional chest pain/pressure, dyspnea at rest,PND, orthopnea, palpitations, lightheadedness, weight changes, changes in LE edema, and syncope.      Past Medical History:   has a past medical history of Colon polyps, Mechanical heart valve present, and S/P aortic valve replacement with metallic valve.     Surgical History:   has a past surgical history that includes Aortic valve replacement; Colonoscopy (2008); Colonoscopy (8-); eye surgery; and Colonoscopy (2018).    Social History:   reports that he has never smoked. He has never used smokeless tobacco. He reports current alcohol use of about 1.0 standard drink of alcohol per week. He reports that he does not use drugs.      Family History:  family history includes Breast Cancer in his mother and sister; Cancer in his mother; Heart Disease in his mother; Parkinsonism in his father; Prostate Cancer in his maternal grandfather; Stomach Cancer in his paternal grandfather.     Home Medications:  Were reviewed and are listed in nursing record and/or below  Home Medications           Prior to Admission medications    Medication Sig Start Date End Date Taking?  Authorizing Provider   carbidopa-levodopa (SINEMET CR)  MG per extended release tablet Take 2 tablets by mouth nightly     Yes Historical Provider, MD   warfarin (COUMADIN) 10 MG tablet TAKE 1 TABLET DAILY EXCEPT 5 MG EVERY MONDAY AND FRIDAY OR AS DIRECTED BY [facility] [phone] 3/25/22   Yes Gianna Barnes, DO   metoprolol succinate (TOPROL XL) 25 MG extended release tablet TAKE 1 TABLET NIGHTLY 6/15/21   Yes Gianna Barnes, DO   carbidopa-levodopa (SINEMET)  MG per tablet Take 2 tablets by mouth 4 times daily 6/18/20   Yes Gianna Barnes DO   aspirin 81 MG tablet Take 81 mg by mouth daily     Yes Historical Provider, MD            CURRENT Medications:    Current Meds Link used for Sign Out Report   No current facility-administered medications for this visit.         Allergies:  Amlodipine and Codeine               Review of Systems: SEE HPI   · Constitutional: no unanticipated weight loss. There's been no change in energy level, sleep pattern, or activity level. No fevers, chills. · Eyes: No visual changes or diplopia. No scleral icterus. · ENT: No Headaches, hearing loss or vertigo. No mouth sores or sore throat. · Cardiovascular: No Chest pain, tightness or discomfort. · No Shortness of breath. No Dyspnea on exertion, Orthopnea, Paroxysmal nocturnal dyspnea or breathlessness at rest.  · No Palpitations. · No Syncope ('blackouts', 'faints', 'collapse') or dizziness. · Respiratory: No cough or wheezing, no sputum production. No hematemesis. · Gastrointestinal: No abdominal pain, appetite loss, blood in stools. No change in bowel or bladder habits. · Genitourinary: No dysuria, trouble voiding, or hematuria. · Musculoskeletal:  No gait disturbance, no joint complaints. · Integumentary: No rash or pruritis. · Neurological: No headache, diplopia, change in muscle strength, numbness or tingling. · Psychiatric: No anxiety or depression. · Endocrine: No temperature intolerance. No excessive thirst, fluid intake, or urination. No tremor. · Hematologic/Lymphatic: No abnormal bruising or bleeding, blood clots or swollen lymph nodes.   · Allergic/Immunologic: No nasal congestion or hives.        Objective:      PHYSICAL EXAM:       Vitals       Vitals:     04/21/22 1408   BP: 128/86   Pulse: 81   SpO2: 90%   Weight: 170 lb (77.1 kg)   Height: 5' 10\" (1.778 m)          Weight: 170 lb (77.1 kg)       General Appearance:  Alert, cooperative, no distress, appears stated age. Head:  Normocephalic, without obvious abnormality, atraumatic. Eyes:  Pupils equal and round. No scleral icterus. Mouth: Moist mucosa, no pharyngeal erythema. Nose: Nares normal. No drainage or sinus tenderness. Neck: Supple, symmetrical, trachea midline. No adenopathy. No tenderness/mass/nodules. No carotid bruit or elevated JVD. Lungs:   Respiratory Effort: Normal   Auscultation: Clear to auscultation bilaterally, respirations unlabored. No wheeze, rales   Chest Wall:  No tenderness or deformity. Cardiovascular:     Pulses  Palpation: normal   Ascultation: Regular rate, mechanical Aortic click  Y5/ S2 normal. No murmur, rub, or gallop. 2+ radial and pedal pulses, symmetric  Carotid  Femoral   Abdomen and Gastrointestinal:   Soft, non-tender, bowel sounds active. Liver and Spleen  Masses   Musculoskeletal: No muscle wasting  Back  Gait   Extremities: Extremities normal, atraumatic. No cyanosis or edema. No cyanosis clubbing         Skin: Inspection and palpation performed, no rashes or lesions. Pysch: Normal mood and affect.  Alert and oriented to time place person   Neurologic: Normal gross motor and sensory exam.       Labs      All labs have been reviewed           Lab Results   Component Value Date     WBC 8.2 04/14/2021     RBC 5.10 04/14/2021     HGB 15.6 04/14/2021     HCT 47.7 04/14/2021     MCV 93.6 04/14/2021     RDW 15.6 04/14/2021      04/14/2021            Lab Results   Component Value Date      04/14/2021     K 5.0 04/14/2021     K 4.1 10/20/2019      04/14/2021     CO2 25 04/14/2021     BUN 14 04/14/2021     CREATININE 0.8 04/14/2021     GFRAA >60 2021     AGRATIO 2.1 2021     LABGLOM >60 2021     GLUCOSE 75 2021     PROT 6.6 2021     CALCIUM 9.5 2021     BILITOT 0.8 2021     ALKPHOS 53 2021     AST 27 2021     ALT <5 2021      No results found for: PTINR  No results found for: LABA1C  No results found for: CKTOTAL, CKMB, CKMBINDEX, TROPONINI     Cardiac, Vascular and Imaging Data all Personally Reviewed in Detail by Myself       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    Moderate concentric left ventricular hypertrophy. Abnormal (paradoxical) septal motion consistent with postoperative state.    The base of the inferior wall is hypokinetic   Left ventricular ejection fraction is in the normal range.    Mild left atrial dilatation. Mechanical prosthetic aortic valve which is well seated   Mildly dilated proximal ascending aorta      ECHO 2020  Concentric LVH with normal LV size and wall motion. EF is   55%. Grade I  diastolic dysfunction with normal LV filling pressures. The left atrium is mildly dilated. A mechanical artificial aortic valve appears well seated with a mean  gradient of 17 mmHg. The right ventricle is normal in size and function. A bubble study was performed and shows evidence of right to left shunting     ECHO 2021  Left ventricular cavity size is normal.  There is asymmetric hypertrophy of the basal septum. Ejection fraction is visually estimated to be 55-60%. No regional wall motion abnormalities are noted. Indeterminate diastolic function. Normal right ventricular size and function. Right ventricular systolic function is normal.     Stress Test: none      Cath: none      Other imaging:      Assessment and Plan      Mechanical AVR  in [facility] on Coumadin (PCP managed). Dyspnea with exertion.  Repeat echocardiogram yearly.      Hypertension  Controlled. Continue current medical management.      Dyspnea  With exertion. Will order a nuclear stress test to assess this.      Follow up in 1 year.      Thank you for allowing us to participate in the care of [facility].   Please do not hesitate to contact me if you have any questions.     Salvador Whaley MD, MPH

## 2022-05-09 NOTE — PROCEDURES
[address] [address]                            CARDIAC CATHETERIZATION    PATIENT NAME: Edilberto Rowe                      :        1934  MED REC NO:   [id number]                          ROOM:  ACCOUNT NO:   [de-identified]                           ADMIT DATE: 2022  PROVIDER:     Tito Neal MD    DATE OF PROCEDURE:  2022    PROCEDURE PERFORMED:  Left heart catheterization. ASA is III. Mallampati score II. No complications. Estimated blood loss less than 20 mL. PROCEDURE IN DETAIL:  Right radial could not be accessed. Right common  femoral artery accessed, inserted slender sheath. JL4 selected the left  main, performed angiography of the left system. JR4 engaged the right coronary artery and performed angiography of the  right system. _____ to perform intervention of the circumflex. We inserted XB 4.0  guide catheter but had a lot of trouble keeping it engaged. At that  point, the procedure was aborted. All the catheters, wires, sheaths  removed. Manual pressure applied to achieve hemostasis. ANGIOGRAPHIC FINDINGS:  1. Left main 30% stenosis. 2.  Left anterior descending artery mid and distal 80 to 90% heavily  calcified stenosis present. 3.  Left circumflex, obtuse marginal 1 proximal 90% stenosis with heavy  calcification. 4.  Right coronary artery completely occluded. SUMMARY:  Significant LAD and circumflex disease with heavy  calcification. RECOMMENDATIONS:  1. Continue postop post cath care. 2.  Site care. 3.  Risk factor modification. 4.  Antiplatelet therapy. 5.  We will consider high risk complex revascularization.         Joseph Gavin MD    D: 2022 14:22:06       T: 2022 17:01:58     [email or web address]  Job#: [id number]     Doc#: [id number]    CC:

## 2022-05-09 NOTE — PROCEDURES
Moderate Sedation:  Start time: 1258  Stop time: 1417  3 mg versed   150 mcg fentanyl   An independent trained observer pushed medications at my direction. We monitored the patient's level of consciousness and vital signs/physiologic status throughout the procedure duration (see start and start times above).

## 2022-05-10 LAB — POC ACT LR: >400 SEC

## 2022-05-11 LAB — POC ACT LR: 186 SEC

## 2022-05-25 ENCOUNTER — ANTI-COAG VISIT (OUTPATIENT)
Dept: PHARMACY | Age: 87
End: 2022-05-25
Payer: MEDICARE

## 2022-05-25 DIAGNOSIS — Z79.01 ANTICOAGULATED ON COUMADIN: Primary | ICD-10-CM

## 2022-05-25 DIAGNOSIS — Z95.2 HX OF ARTIFICIAL HEART VALVE REPLACEMENT: ICD-10-CM

## 2022-05-25 LAB — INTERNATIONAL NORMALIZATION RATIO, POC: 2.5

## 2022-05-25 PROCEDURE — 85610 PROTHROMBIN TIME: CPT

## 2022-05-25 PROCEDURE — 99211 OFF/OP EST MAY X REQ PHY/QHP: CPT

## 2022-05-25 NOTE — PROGRESS NOTES
Christina Lee is a 80 y.o. here for warfarin management. Russell Ayon had an INR test today. Results were reviewed and appropriate warfarin management was completed. This visit was performed as: An in person visit. Protocols were followed with precautions to reduce the spread of COVID-19. Patient verifies current dosing regimen: Yes     Warfarin medication reviewed and updated on the patient 's home medication list: Yes   All other medications reviewed and updated on the patient 's home medication list: Yes :Sinemet dose increased to 2.5mg four times daily    Lab Results   Component Value Date    INR 2.5 2022    INR 1.33 (H) 2022    INR 2.2 2022           Anticoagulation Summary  As of 2022    INR goal:  2.5-3.5   TTR:  67.1 % (1.4 y)   INR used for dosin.5 (2022)   Warfarin maintenance plan:  5 mg (10 mg x 0.5) every Mon, Fri; 10 mg (10 mg x 1) all other days   Weekly warfarin total:  60 mg   Plan last modified:  [facility] (2021)   Next INR check:  2022   Priority:  Maintenance   Target end date: Indefinite    Indications    Anticoagulated on Coumadin [Z79.01]  Hx of artificial heart valve replacement [Z95.2]             Anticoagulation Episode Summary     INR check location:      Preferred lab:      Send INR reminders to:  [facility]    Comments:  [facility]      Anticoagulation Care Providers     Provider Role Specialty Phone number    Ayaka DO Ada Referring Family Medicine [phone]          Warfarin assessment / plan:   Appears well. No changes affecting warfarin therapy were noted. No acute findings. INR within goal range. No change to warfarin therapy today. His sinemet dose was increased by neurology yesterday. This should not alter the INR. Next INR in 6 weeks per MD order. He will call with any issues.     Description    CONTINUE: Warfarin 10 mg daily except 5 mg on Monday and     Call [phone] with signs or symptoms of bleeding or ANY medication changes (including over-the-counter medications or herbal supplements). If significant bleeding occurs please seek immediate medical attention. Keep the number of servings of vitamin K containing foods (dark green, leafy vegetables) the same each week. Please call if this changes. Salad every other day and a green vegetable most days of the week. He does not eat dark green leafy vegetables. Limit alcohol intake. He drinks a glass of wine most evenings. Please call if this changes. Immunization History   Administered Date(s) Administered    COVID-19, Pfizer Purple top, DILUTE for use, 12+ yrs, 30mcg/0.3mL dose 01/20/2021, 02/10/2021, 10/06/2021    Influenza, High Dose (Fluzone 65 yrs and older) 09/25/2020    Influenza, Quadv, adjuvanted, 65 yrs +, IM, PF (Fluad) 10/06/2021    Zoster Recombinant (Shingrix) 09/22/2020           Orders Placed This Encounter   Procedures    POCT INR     This external order was created through the results console. No orders of the defined types were placed in this encounter. Reviewed AVS with patient / caregiver.     Billing Points:  0 billing points this visit       CLINICAL PHARMACY CONSULT: MED RECONCILIATION/REVIEW ADDENDUM    For Pharmacy Admin Tracking Only     Intervention Detail:    Total # of Interventions Recommended: 0   Total # of Interventions Accepted: 0   Time Spent (min): 20

## 2022-05-26 ENCOUNTER — OFFICE VISIT (OUTPATIENT)
Dept: CARDIOLOGY CLINIC | Age: 87
End: 2022-05-26
Payer: MEDICARE

## 2022-05-26 VITALS
WEIGHT: 166 LBS | HEIGHT: 70 IN | HEART RATE: 62 BPM | DIASTOLIC BLOOD PRESSURE: 72 MMHG | SYSTOLIC BLOOD PRESSURE: 134 MMHG | OXYGEN SATURATION: 97 % | BODY MASS INDEX: 23.77 KG/M2

## 2022-05-26 DIAGNOSIS — Z95.2 HX OF ARTIFICIAL HEART VALVE REPLACEMENT: ICD-10-CM

## 2022-05-26 DIAGNOSIS — I25.10 CORONARY ARTERY DISEASE INVOLVING NATIVE CORONARY ARTERY OF NATIVE HEART WITHOUT ANGINA PECTORIS: ICD-10-CM

## 2022-05-26 DIAGNOSIS — R06.02 SOB (SHORTNESS OF BREATH) ON EXERTION: Primary | ICD-10-CM

## 2022-05-26 DIAGNOSIS — I10 ESSENTIAL HYPERTENSION: ICD-10-CM

## 2022-05-26 PROCEDURE — 93000 ELECTROCARDIOGRAM COMPLETE: CPT | Performed by: INTERNAL MEDICINE

## 2022-05-26 PROCEDURE — 1123F ACP DISCUSS/DSCN MKR DOCD: CPT | Performed by: INTERNAL MEDICINE

## 2022-05-26 PROCEDURE — 99214 OFFICE O/P EST MOD 30 MIN: CPT | Performed by: INTERNAL MEDICINE

## 2022-05-26 RX ORDER — ISOSORBIDE MONONITRATE 30 MG/1
30 TABLET, EXTENDED RELEASE ORAL DAILY
Qty: 90 TABLET | Refills: 3 | Status: SHIPPED | OUTPATIENT
Start: 2022-05-26

## 2022-05-26 RX ORDER — ATORVASTATIN CALCIUM 40 MG/1
40 TABLET, FILM COATED ORAL DAILY
Qty: 90 TABLET | Refills: 3 | Status: SHIPPED | OUTPATIENT
Start: 2022-05-26

## 2022-05-26 RX ORDER — LATANOPROST 50 UG/ML
SOLUTION/ DROPS OPHTHALMIC
COMMUNITY
Start: 2022-04-21

## 2022-05-26 RX ORDER — LANOLIN ALCOHOL/MO/W.PET/CERES
3 CREAM (GRAM) TOPICAL NIGHTLY
COMMUNITY
Start: 2022-05-24 | End: 2022-07-28 | Stop reason: ALTCHOICE

## 2022-05-26 RX ORDER — LATANOPROST 50 UG/ML
SOLUTION/ DROPS OPHTHALMIC
COMMUNITY
Start: 2022-04-21 | End: 2022-07-28

## 2022-05-26 NOTE — PROGRESS NOTES
[address]  Cardiology Consult    Oz Richmond  1934    May 26, 2022    PCP: Dr. Riccardo Neumann   Former Cardiologist: Dr. Lolis Nicole     Reason for Referral: hx of AVR 1991    CC: \"I have some shortness of breath. \"       Subjective:     History of Present Illness:    Oz Richmond is a 80 y.o. patient with a PMH significant for Parkinson's, HTN, mechanical AVR 1991 in [address] [place] [address] on Coumadin (PCP managed), Aspirin, Toprol-xl  therapy. Today, he is here for follow up to discuss [facility]. He states that he has been walking and he did notice some chest discomfort at 9 minutes and shortness of breath at 14 minutes. Patient denies dyspnea at rest,PND, orthopnea, palpitations, lightheadedness, weight changes, changes in LE edema, and syncope. Patient reports compliance to his medications. Past Medical History:   has a past medical history of Colon polyps, Mechanical heart valve present, and S/P aortic valve replacement with metallic valve. Surgical History:   has a past surgical history that includes Aortic valve replacement; Colonoscopy (2008); Colonoscopy (8-); eye surgery; and Colonoscopy (2018). Social History:   reports that he has never smoked. He has never used smokeless tobacco. He reports current alcohol use of about 1.0 standard drink of alcohol per week. He reports that he does not use drugs. Family History:  family history includes Breast Cancer in his mother and sister; Cancer in his mother; Heart Disease in his mother; Parkinsonism in his father; Prostate Cancer in his maternal grandfather; Stomach Cancer in his paternal grandfather. Home Medications:  Were reviewed and are listed in nursing record and/or below  Prior to Admission medications    Medication Sig Start Date End Date Taking?  Authorizing Provider   melatonin 3 mg TABS tablet Take 3 mg by mouth nightly 5/24/22  Yes Historical Provider, MD   latanoprost (XALATAN) 0.005 % ophthalmic solution 4/21/22  Yes Historical Provider, MD   latanoprost (XALATAN) 0.005 % ophthalmic solution  4/21/22  Yes Historical Provider, MD   metoprolol succinate (TOPROL XL) 25 MG extended release tablet TAKE 1 TABLET NIGHTLY 5/2/22  Yes Tereso Chavez, DO   carbidopa-levodopa (SINEMET CR)  MG per extended release tablet Take 2 tablets by mouth nightly   Yes Historical Provider, MD   warfarin (COUMADIN) 10 MG tablet TAKE 1 TABLET DAILY EXCEPT 5 MG EVERY MONDAY AND FRIDAY OR AS DIRECTED BY [facility] [phone] 3/25/22  Yes Madelin Jones DO   carbidopa-levodopa (SINEMET)  MG per tablet Take 2 tablets by mouth 4 times daily  Patient taking differently: Take 2.5 tablets by mouth 4 times daily  6/18/20  Yes Madelin Jones DO   aspirin 81 MG tablet Take 81 mg by mouth daily   Yes Historical Provider, MD        CURRENT Medications:  No current facility-administered medications for this visit. Allergies:  Amlodipine and Codeine           Review of Systems: SEE HPI   · Constitutional: no unanticipated weight loss. There's been no change in energy level, sleep pattern, or activity level. No fevers, chills. · Eyes: No visual changes or diplopia. No scleral icterus. · ENT: No Headaches, hearing loss or vertigo. No mouth sores or sore throat. · Cardiovascular: No Chest pain, tightness or discomfort.  No Shortness of breath. No Dyspnea on exertion, Orthopnea, Paroxysmal nocturnal dyspnea or breathlessness at rest.   No Palpitations.  No Syncope ('blackouts', 'faints', 'collapse') or dizziness. · Respiratory: No cough or wheezing, no sputum production. No hematemesis. · Gastrointestinal: No abdominal pain, appetite loss, blood in stools. No change in bowel or bladder habits. · Genitourinary: No dysuria, trouble voiding, or hematuria. · Musculoskeletal:  No gait disturbance, no joint complaints. · Integumentary: No rash or pruritis.   · Neurological: No headache, diplopia, change in muscle strength, numbness or tingling. · Psychiatric: No anxiety or depression. · Endocrine: No temperature intolerance. No excessive thirst, fluid intake, or urination. No tremor. · Hematologic/Lymphatic: No abnormal bruising or bleeding, blood clots or swollen lymph nodes. · Allergic/Immunologic: No nasal congestion or hives. Objective:     PHYSICAL EXAM:      Vitals:    05/26/22 1014   BP: 134/72   Pulse: 62   SpO2: 97%   Weight: 166 lb (75.3 kg)   Height: 5' 10\" (1.778 m)      Weight: 166 lb (75.3 kg)       General Appearance:  Alert, cooperative, no distress, appears stated age. Head:  Normocephalic, without obvious abnormality, atraumatic. Eyes:  Pupils equal and round. No scleral icterus. Mouth: Moist mucosa, no pharyngeal erythema. Nose: Nares normal. No drainage or sinus tenderness. Neck: Supple, symmetrical, trachea midline. No adenopathy. No tenderness/mass/nodules. No carotid bruit or elevated JVD. Lungs:   Respiratory Effort: Normal   Auscultation: Clear to auscultation bilaterally, respirations unlabored. No wheeze, rales   Chest Wall:  No tenderness or deformity. Cardiovascular:    Pulses  Palpation: normal   Ascultation: Regular rate, mechanical Aortic click  N7/ S2 normal. No murmur, rub, or gallop. 2+ radial and pedal pulses, symmetric  Carotid  Femoral   Abdomen and Gastrointestinal:   Soft, non-tender, bowel sounds active. Liver and Spleen  Masses   Musculoskeletal: No muscle wasting  Back  Gait   Extremities: Extremities normal, atraumatic. No cyanosis or edema. No cyanosis clubbing       Skin: Inspection and palpation performed, no rashes or lesions. Pysch: Normal mood and affect.  Alert and oriented to time place person   Neurologic: Normal gross motor and sensory exam.       Labs     All labs have been reviewed    Lab Results   Component Value Date    WBC 5.6 04/27/2022    RBC 4.84 04/27/2022    HGB 14.6 04/27/2022    HCT 44.5 04/27/2022    MCV 91.8 04/27/2022    RDW 15.0 2022     2022     Lab Results   Component Value Date     2022    K 5.0 2022    K 4.1 10/20/2019     2022    CO2 26 2022    BUN 15 2022    CREATININE 0.8 2022    GFRAA >60 2022    AGRATIO 2.1 2022    LABGLOM >60 2022    GLUCOSE 69 2022    PROT 6.5 2022    CALCIUM 9.4 2022    BILITOT 0.7 2022    ALKPHOS 48 2022    AST 22 2022    ALT <5 2022     No results found for: PTINR  No results found for: LABA1C  No results found for: CKTOTAL, CKMB, CKMBINDEX, TROPONINI    Cardiac, Vascular and Imaging Data all Personally Reviewed in Detail by Myself      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    Moderate concentric left ventricular hypertrophy. Abnormal (paradoxical) septal motion consistent with postoperative state.    The base of the inferior wall is hypokinetic   Left ventricular ejection fraction is in the normal range.    Mild left atrial dilatation. Mechanical prosthetic aortic valve which is well seated   Mildly dilated proximal ascending aorta     ECHO 2020  Concentric LVH with normal LV size and wall motion. EF is   55%. Grade I  diastolic dysfunction with normal LV filling pressures. The left atrium is mildly dilated. A mechanical artificial aortic valve appears well seated with a mean  gradient of 17 mmHg. The right ventricle is normal in size and function. A bubble study was performed and shows evidence of right to left shunting    ECHO 2021  Left ventricular cavity size is normal.  There is asymmetric hypertrophy of the basal septum. Ejection fraction is visually estimated to be 55-60%. No regional wall motion abnormalities are noted. Indeterminate diastolic function. Normal right ventricular size and function.   Right ventricular systolic decision making performed by me.

## 2022-05-26 NOTE — PATIENT INSTRUCTIONS
Patient Education        Heart-Healthy Diet: Care Instructions  Your Care Instructions     A heart-healthy diet has lots of vegetables, fruits, nuts, beans, and whole grains, and is low in salt. It limits foods that are high in saturated fat, such as meats, cheeses, and fried foods. It may be hard to change your diet,but even small changes can lower your risk of heart attack and heart disease. Follow-up care is a key part of your treatment and safety. Be sure to make and go to all appointments, and call your doctor if you are having problems. It's also a good idea to know your test results and keep alist of the medicines you take. How can you care for yourself at home? Watch your portions   Use food labels to learn what the recommended servings are for the foods you eat.  Eat only the number of calories you need to stay at a healthy weight. If you need to lose weight, eat fewer calories than your body burns (through exercise and other physical activity). Eat more fruits and vegetables   Eat a variety of fruit and vegetables every day. Dark green, deep orange, red, or yellow fruits and vegetables are especially good for you. Examples include spinach, carrots, peaches, and berries.  Keep carrots, celery, and other veggies handy for snacks. Buy fruit that is in season and store it where you can see it so that you will be tempted to eat it.  Cook dishes that have a lot of veggies in them, such as stir-fries and soups. Limit saturated fat   Read food labels, and try to avoid saturated fats. They increase your risk of heart disease.  Use olive or canola oil when you cook.  Bake, broil, grill, or steam foods instead of frying them.  Choose lean meats instead of high-fat meats such as hot dogs and sausages. Cut off all visible fat when you prepare meat.  Eat fish, skinless poultry, and meat alternatives such as soy products instead of high-fat meats.  Soy products, such as tofu, may be especially good for your heart.  Choose low-fat or fat-free milk and dairy products. Eat foods high in fiber   Eat a variety of grain products every day. Include whole-grain foods that have lots of fiber and nutrients. Examples of whole-grain foods include oats, whole wheat bread, and brown rice.  Buy whole-grain breads and cereals, instead of white bread or pastries. Limit salt and sodium   Limit how much salt and sodium you eat to help lower your blood pressure.  Taste food before you salt it. Add only a little salt when you think you need it. With time, your taste buds will adjust to less salt.  Eat fewer snack items, fast foods, and other high-salt, processed foods. Check food labels for the amount of sodium in packaged foods.  Choose low-sodium versions of canned goods (such as soups, vegetables, and beans). Limit sugar   Limit drinks and foods with added sugar. These include candy, desserts, and soda pop. Limit alcohol   Limit alcohol to no more than 2 drinks a day for men and 1 drink a day for women. Too much alcohol can cause health problems. When should you call for help? Watch closely for changes in your health, and be sure to contact your doctor if:     You would like help planning heart-healthy meals. Where can you learn more? Go to https://Lib[employer]Alarm.comeb.[employer]. org and sign in to your [employer] account. Enter V137 in the [facility] box to learn more about \"Heart-Healthy Diet: Care Instructions. \"     If you do not have an account, please click on the \"Sign Up Now\" link. Current as of: September 8, 2021               Content Version: 13.2  © 2297-8643 Healthwise, Incorporated. Care instructions adapted under license by [facility] ([facility]). If you have questions about a medical condition or this instruction, always ask your healthcare professional. [place] [address] any warranty or liability for your use of this information.

## 2022-07-06 ENCOUNTER — ANTI-COAG VISIT (OUTPATIENT)
Dept: PHARMACY | Age: 87
End: 2022-07-06
Payer: MEDICARE

## 2022-07-06 ENCOUNTER — OFFICE VISIT (OUTPATIENT)
Dept: FAMILY MEDICINE CLINIC | Age: 87
End: 2022-07-06
Payer: MEDICARE

## 2022-07-06 VITALS
HEIGHT: 70 IN | SYSTOLIC BLOOD PRESSURE: 124 MMHG | DIASTOLIC BLOOD PRESSURE: 80 MMHG | WEIGHT: 165 LBS | BODY MASS INDEX: 23.62 KG/M2

## 2022-07-06 DIAGNOSIS — I20.0 UNSTABLE ANGINA (HCC): ICD-10-CM

## 2022-07-06 DIAGNOSIS — G20 PARKINSON DISEASE (HCC): ICD-10-CM

## 2022-07-06 DIAGNOSIS — Z79.01 ANTICOAGULATED ON COUMADIN: Primary | ICD-10-CM

## 2022-07-06 LAB — INTERNATIONAL NORMALIZATION RATIO, POC: 4.8

## 2022-07-06 PROCEDURE — 1123F ACP DISCUSS/DSCN MKR DOCD: CPT | Performed by: FAMILY MEDICINE

## 2022-07-06 PROCEDURE — 99211 OFF/OP EST MAY X REQ PHY/QHP: CPT

## 2022-07-06 PROCEDURE — 99214 OFFICE O/P EST MOD 30 MIN: CPT | Performed by: FAMILY MEDICINE

## 2022-07-06 PROCEDURE — 85610 PROTHROMBIN TIME: CPT

## 2022-07-06 ASSESSMENT — PATIENT HEALTH QUESTIONNAIRE - PHQ9
SUM OF ALL RESPONSES TO PHQ QUESTIONS 1-9: 0
SUM OF ALL RESPONSES TO PHQ QUESTIONS 1-9: 0
SUM OF ALL RESPONSES TO PHQ9 QUESTIONS 1 & 2: 0
SUM OF ALL RESPONSES TO PHQ QUESTIONS 1-9: 0
1. LITTLE INTEREST OR PLEASURE IN DOING THINGS: 0
2. FEELING DOWN, DEPRESSED OR HOPELESS: 0
SUM OF ALL RESPONSES TO PHQ QUESTIONS 1-9: 0

## 2022-07-06 NOTE — PROGRESS NOTES
Gui Patel is a 80 y.o. here for warfarin management. John Santiago had an INR test today. Results were reviewed and appropriate warfarin management was completed. This visit was performed as: An in person visit. Protocols were followed with precautions to reduce the spread of COVID-19. Patient verifies current dosing regimen: Yes     Warfarin medication reviewed and updated on the patient 's home medication list: Yes   All other medications reviewed and updated on the patient 's home medication list: Yes: Imdur and Lipitor started 3 weeks ago     Lab Results   Component Value Date    INR 4.8 2022    INR 2.5 2022    INR 1.33 (H) 2022       Patient Findings     Negatives:  Signs/symptoms of thrombosis, Signs/symptoms of bleeding, Change in health, Missed doses, Change in medications, Change in diet/appetite, Bruising          Anticoagulation Summary  As of 2022    INR goal:  2.5-3.5   TTR:  65.3 % (1.5 y)   INR used for dosin.8 (2022)   Warfarin maintenance plan:  5 mg (10 mg x 0.5) every Mon, Fri; 10 mg (10 mg x 1) all other days   Weekly warfarin total:  60 mg   Plan last modified:  Mecca Holden [facility] (2021)   Next INR check:     Priority:  Maintenance   Target end date: Indefinite    Indications    Anticoagulated on Coumadin [Z79.01]  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 Denies signs and symptoms of bleeding/bruising. Denies change in appetite. Denies illness, fever, vomiting or diarrhea. Patient started Imdur and Lipitor 3 weeks ago.  Lipitor could be contributing to elevated INR today. Extra warfarin doses. Patient states that her may have taken warfarin incorrectly last weekend. He may have taken more warfarin than prescribed but it is unclear how much. Explained to patient to seek emergency care if unusual signs of bruising, bleeding, or if he should hit his head go to the emergency room. For INR of 4.8, we will hold warfarin tomorrow and Friday. He already took his warfarin today. We advised him to have a green vegetable today. We will then resume previous dosing and see him in one week. Description    CONTINUE: Warfarin 10 mg daily except 5 mg on Monday and Fridays    Call [phone] with signs or symptoms of bleeding or ANY medication changes (including over-the-counter medications or herbal supplements). If significant bleeding occurs please seek immediate medical attention. Keep the number of servings of vitamin K containing foods (dark green, leafy vegetables) the same each week. Please call if this changes. Salad every other day and a green vegetable most days of the week. He does not eat dark green leafy vegetables. Limit alcohol intake. He drinks a glass of wine most evenings. Please call if this changes. Immunization History   Administered Date(s) Administered    COVID-19, PFIZER PURPLE top, DILUTE for use, (age 15 y+), 30mcg/0.3mL 01/20/2021, 02/10/2021, 10/06/2021    Influenza, High Dose (Fluzone 65 yrs and older) 09/25/2020    Influenza, Quadv, adjuvanted, 65 yrs +, IM, PF (Fluad) 10/06/2021    Zoster Recombinant (Shingrix) 09/22/2020           Orders Placed This Encounter   Procedures    POCT INR     This external order was created through the results console. No orders of the defined types were placed in this encounter. Reviewed AVS with patient / caregiver.     Billing Points:  Adjust dosage and/or reconcile meds (fill pill box) </= 5 medications - 2 points       CLINICAL PHARMACY CONSULT: MED RECONCILIATION/REVIEW ADDENDUM    For Pharmacy Admin Tracking Only     Intervention Detail: Dose Adjustment: 1, reason: Therapy Optimization   Total # of Interventions Recommended: 1   Total # of Interventions Accepted: 1   Time Spent (min): 20

## 2022-07-06 NOTE — PROGRESS NOTES
2022     Oz Richmond (:  1934) is a 80 y.o. male, here for evaluation of the following medical concerns:    HPI  1. Aortic valve- caumadin 10 mg  four times a week, 5 mg three times a week, last INR was wnl, he is wanting it checked today, recently saw his new  Cardiologist, feels well and doesn't have a concern today. He is being managed by the Coumadin clinic. 2.  Parkinsons disease- tremors at rest, ambulation is wnl, follows with Neurology at [facility], feels it is well controlled on the medication, denied a side effect from the medication, takes it as prescribed. \"1. 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 He has mild wearing off with return of tremor. Impaired sleep maintenance likely represents sub-optimal dopamine coverage overnight. \"      Today, denied chest pain, sob, n, v, or diarrhea. Review of Systems   Constitutional:  Positive for fatigue. Negative for activity change. HENT:  Negative for congestion, ear pain and sore throat. Respiratory:  Negative for shortness of breath. Cardiovascular:  Negative for chest pain, palpitations and leg swelling. Gastrointestinal:  Negative for abdominal pain, diarrhea, nausea and vomiting. Musculoskeletal:  Positive for arthralgias. Neurological:  Negative for light-headedness and headaches. Psychiatric/Behavioral:  Negative for dysphoric mood. The patient is not nervous/anxious. Prior to Visit Medications    Medication Sig Taking?  Authorizing Provider   melatonin 3 mg TABS tablet Take 3 mg by mouth nightly Yes Historical Provider, MD   latanoprost (XALATAN) 0.005 % ophthalmic solution  Yes Historical Provider, MD   latanoprost (XALATAN) 0.005 % ophthalmic solution  Yes Historical Provider, MD   atorvastatin (LIPITOR) 40 MG tablet Take 1 tablet by mouth daily Yes Antonio Coronado MD   isosorbide mononitrate (IMDUR) 30 MG extended release tablet Take 1 tablet by mouth daily Yes Antonio Coronado MD   metoprolol succinate (TOPROL XL) 25 MG extended release tablet TAKE 1 TABLET NIGHTLY Yes Tereso Chavez, DO   carbidopa-levodopa (SINEMET CR)  MG per extended release tablet Take 2 tablets by mouth nightly Yes Historical Provider, MD   warfarin (COUMADIN) 10 MG tablet TAKE 1 TABLET DAILY EXCEPT 5 MG EVERY MONDAY AND FRIDAY OR AS DIRECTED BY [facility] [phone] Yes Janae Benson,    carbidopa-levodopa (SINEMET)  MG per tablet Take 2 tablets by mouth 4 times daily  Patient taking differently: Take 2.5 tablets by mouth 4 times daily  Yes Janae Benson DO   aspirin 81 MG tablet Take 81 mg by mouth daily Yes Historical Provider, MD        Social History     Tobacco Use    Smoking status: Never    Smokeless tobacco: Never   Substance Use Topics    Alcohol use: Yes     Alcohol/week: 1.0 standard drink     Types: 1 Glasses of wine per week     Comment: wine weekly        Vitals:    07/06/22 1016   BP: 124/80   Weight: 165 lb (74.8 kg)   Height: 5' 10\" (1.778 m)     Estimated body mass index is 23.68 kg/m² as calculated from the following:    Height as of this encounter: 5' 10\" (1.778 m). Weight as of this encounter: 165 lb (74.8 kg). Physical Exam  Vitals reviewed. Constitutional:       Appearance: Normal appearance. HENT:      Head: Normocephalic and atraumatic. Right Ear: External ear normal.      Left Ear: External ear normal.   Cardiovascular:      Rate and Rhythm: Regular rhythm. Pulses: Normal pulses. Pulmonary:      Effort: Pulmonary effort is normal. No respiratory distress. Breath sounds: Normal breath sounds. Abdominal:      General: Abdomen is flat. Palpations: Abdomen is soft. Tenderness: There is no abdominal tenderness. Neurological:      General: No focal deficit present.       Mental Status: He is alert and oriented to person, place, and time. Psychiatric:         Behavior: Behavior normal.       ASSESSMENT/PLAN:  1. Anticoagulated on Coumadin  Stable  Continue with medication  Keep appointments with specialist.   Shaune Drop questions     2. Unstable angina (HCC)Stable  Continue with medication  Keep appointments with specialist.   Nba Drop questions     3. Parkinson disease (HCC)Stable  Continue with medication  Keep appointments with specialist.   Nba Drop questions     No follow-ups on file.

## 2022-07-14 ENCOUNTER — HOSPITAL ENCOUNTER (OUTPATIENT)
Dept: NON INVASIVE DIAGNOSTICS | Age: 87
Discharge: HOME | End: 2022-07-14
Payer: MEDICARE

## 2022-07-14 DIAGNOSIS — Z95.2 HX OF ARTIFICIAL HEART VALVE REPLACEMENT: ICD-10-CM

## 2022-07-14 DIAGNOSIS — R06.09 DOE (DYSPNEA ON EXERTION): ICD-10-CM

## 2022-07-14 LAB
LV EF: 43 %
LVEF MODALITY: NORMAL

## 2022-07-14 PROCEDURE — 93306 TTE W/DOPPLER COMPLETE: CPT

## 2022-07-15 ENCOUNTER — APPOINTMENT (OUTPATIENT)
Dept: PHARMACY | Age: 87
End: 2022-07-15
Payer: MEDICARE

## 2022-07-15 ASSESSMENT — ENCOUNTER SYMPTOMS
SHORTNESS OF BREATH: 0
DIARRHEA: 0
VOMITING: 0
SORE THROAT: 0
ABDOMINAL PAIN: 0
NAUSEA: 0

## 2022-07-18 ENCOUNTER — ANTI-COAG VISIT (OUTPATIENT)
Dept: PHARMACY | Age: 87
End: 2022-07-18
Payer: MEDICARE

## 2022-07-18 DIAGNOSIS — Z95.2 HX OF ARTIFICIAL HEART VALVE REPLACEMENT: ICD-10-CM

## 2022-07-18 DIAGNOSIS — Z79.01 ANTICOAGULATED ON COUMADIN: Primary | ICD-10-CM

## 2022-07-18 LAB — INTERNATIONAL NORMALIZATION RATIO, POC: 3.5

## 2022-07-18 PROCEDURE — 99211 OFF/OP EST MAY X REQ PHY/QHP: CPT

## 2022-07-18 PROCEDURE — 85610 PROTHROMBIN TIME: CPT

## 2022-07-18 NOTE — PROGRESS NOTES
Sierra Boles is a 80 y.o. here for warfarin management. Rosalva Gonzalez had an INR test today. Results were reviewed and appropriate warfarin management was completed. This visit was performed as: An in person visit. Protocols were followed with precautions to reduce the spread of COVID-19. Patient verifies current dosing regimen: Yes     Warfarin medication reviewed and updated on the patient 's home medication list: Yes   All other medications reviewed and updated on the patient 's home medication list: No: No changes     Lab Results   Component Value Date    INR 3.5 07/18/2022    INR 4.8 07/06/2022    INR 2.5 05/25/2022       Patient Findings       Negatives:  Signs/symptoms of thrombosis, Signs/symptoms of bleeding, Change in health, Missed doses, Change in medications, Change in diet/appetite, Bruising            Anticoagulation Summary  As of 7/18/2022      INR goal:  2.5-3.5   TTR:  63.9 % (1.5 y)   INR used for dosing:  3.5 (7/18/2022)   Warfarin maintenance plan:  5 mg (10 mg x 0.5) every Mon, Fri; 10 mg (10 mg x 1) all other days   Weekly warfarin total:  60 mg   Plan last modified:  Mecca Holden [facility] (4/16/2021)   Next INR check:  8/15/2022   Priority:  Maintenance   Target end date: Indefinite    Indications    Anticoagulated on Coumadin [Z79.01]  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 No changes affecting warfarin therapy were noted. No acute findings. INR within goal range.      For INR of 3.5 today, will have patient decrease dose tomorrow to 5mg because it is the top of the range and he was high at 4.8 for last INR check on 7-6. Then continue 10mg daily except 5mg on Mondays and Fridays. Patient is usually q6w INR checks but scheduled for 4 weeks due to high INRs recently. Patient had already taken his warfarin this morning. Description    TOMORROW ONLY (7-18) take warfarin 5 mg (1/2 tablet) THEN  CONTINUE: Warfarin 10 mg daily except 5 mg on Monday and Fridays    Call [phone] with signs or symptoms of bleeding or ANY medication changes (including over-the-counter medications or herbal supplements). If significant bleeding occurs please seek immediate medical attention. Keep the number of servings of vitamin K containing foods (dark green, leafy vegetables) the same each week. Please call if this changes. Salad every other day and a green vegetable most days of the week. He does not eat dark green leafy vegetables. Limit alcohol intake. He drinks a glass of wine most evenings. Please call if this changes. Immunization History   Administered Date(s) Administered    COVID-19, PFIZER PURPLE top, DILUTE for use, (age 15 y+), 30mcg/0.3mL 01/20/2021, 02/10/2021, 10/06/2021    Influenza, High Dose (Fluzone 65 yrs and older) 09/25/2020    Influenza, Quadv, adjuvanted, 65 yrs +, IM, PF (Fluad) 10/06/2021    Zoster Recombinant (Shingrix) 09/22/2020           Orders Placed This Encounter   Procedures    POCT INR     This external order was created through the results console. No orders of the defined types were placed in this encounter. Reviewed AVS with patient / caregiver.     Billing Points:  Adjust dosage and/or reconcile meds (fill pill box) </= 5 medications - 2 points       CLINICAL PHARMACY CONSULT: MED RECONCILIATION/REVIEW ADDENDUM    For Pharmacy Admin Tracking Only    Intervention Detail: Dose Adjustment: 1, reason: Therapy Optimization  Total # of Interventions Recommended: 1  Total # of Interventions Accepted: 1  Time Spent (min): 20

## 2022-07-27 NOTE — PROGRESS NOTES
[address]  Cardiology Consult    Oz Richmond  1934 July 28, 2022    PCP: Dr. Cuba Go   Former Cardiologist: Dr. Salena Churchill     Reason for Referral: hx of AVR 1991    CC: \"I have not had any chest pain\"       Subjective:     History of Present Illness:    Oz Richmond is a 80 y.o. patient with a PMH significant for Parkinson's, HTN, mechanical AVR 1991 in [address] [place] [address] on Coumadin (PCP managed), Aspirin, Toprol-xl  therapy. Today, he is here for follow up for CAD. He states that he has been feeling well. He denies any increased symptoms of angina. He does try to walk as much as he can. Patient denies exertional chest pain/pressure, dyspnea at rest, DALTON, PND, orthopnea, palpitations, lightheadedness, weight changes, changes in LE edema, and syncope. Patient reports compliance to his medications. Past Medical History:   has a past medical history of Colon polyps, Mechanical heart valve present, and S/P aortic valve replacement with metallic valve. Surgical History:   has a past surgical history that includes Aortic valve replacement; Colonoscopy (2008); Colonoscopy (08/01/2012); eye surgery; Colonoscopy (2018); and Diagnostic Cardiac Cath Lab Procedure (05/09/2022). Social History:   reports that he has never smoked. He has never used smokeless tobacco. He reports current alcohol use of about 1.0 standard drink per week. He reports that he does not use drugs. Family History:  family history includes Breast Cancer in his mother and sister; Cancer in his mother; Heart Disease in his mother; Parkinsonism in his father; Prostate Cancer in his maternal grandfather; Stomach Cancer in his paternal grandfather. Home Medications:  Were reviewed and are listed in nursing record and/or below  Prior to Admission medications    Medication Sig Start Date End Date Taking?  Authorizing Provider   latanoprost (XALATAN) 0.005 % ophthalmic solution  4/21/22  Yes Historical hematuria. Musculoskeletal:  No gait disturbance, no joint complaints. Integumentary: No rash or pruritis. Neurological: No headache, diplopia, change in muscle strength, numbness or tingling. Psychiatric: No anxiety or depression. Endocrine: No temperature intolerance. No excessive thirst, fluid intake, or urination. No tremor. Hematologic/Lymphatic: No abnormal bruising or bleeding, blood clots or swollen lymph nodes. Allergic/Immunologic: No nasal congestion or hives. Objective:     PHYSICAL EXAM:      Vitals:    07/28/22 0912   BP: (!) 94/50   Pulse: 56   SpO2: 93%   Weight: 166 lb (75.3 kg)   Height: 5' 10\" (1.778 m)        Weight: 166 lb (75.3 kg)       General Appearance:  Alert, cooperative, no distress, appears stated age. Head:  Normocephalic, without obvious abnormality, atraumatic. Eyes:  Pupils equal and round. No scleral icterus. Mouth: Moist mucosa, no pharyngeal erythema. Nose: Nares normal. No drainage or sinus tenderness. Neck: Supple, symmetrical, trachea midline. No adenopathy. No tenderness/mass/nodules. No carotid bruit or elevated JVD. Lungs:   Respiratory Effort: Normal   Auscultation: Clear to auscultation bilaterally, respirations unlabored. No wheeze, rales   Chest Wall:  No tenderness or deformity. Cardiovascular:    Pulses  Palpation: normal   Ascultation: Regular rate, mechanical Aortic click  C2/ S2 normal. No murmur, rub, or gallop. 2+ radial and pedal pulses, symmetric  Carotid  Femoral   Abdomen and Gastrointestinal:   Soft, non-tender, bowel sounds active. Liver and Spleen  Masses   Musculoskeletal: No muscle wasting  Back  Gait   Extremities: Extremities normal, atraumatic. No cyanosis or edema. No cyanosis clubbing       Skin: Inspection and palpation performed, no rashes or lesions. Pysch: Normal mood and affect.  Alert and oriented to time place person   Neurologic: Normal gross motor and sensory exam.       Labs     All labs have been reviewed    Lab Results   Component Value Date/Time    WBC 5.6 2022 09:38 AM    RBC 4.84 2022 09:38 AM    HGB 14.6 2022 09:38 AM    HCT 44.5 2022 09:38 AM    MCV 91.8 2022 09:38 AM    RDW 15.0 2022 09:38 AM     2022 09:38 AM     Lab Results   Component Value Date/Time     2022 09:38 AM    K 5.0 2022 09:38 AM    K 4.1 10/20/2019 05:28 AM     2022 09:38 AM    CO2 26 2022 09:38 AM    BUN 15 2022 09:38 AM    CREATININE 0.8 2022 09:38 AM    GFRAA >60 2022 09:38 AM    AGRATIO 2.1 2022 09:38 AM    LABGLOM >60 2022 09:38 AM    GLUCOSE 69 2022 09:38 AM    PROT 6.5 2022 09:38 AM    CALCIUM 9.4 2022 09:38 AM    BILITOT 0.7 2022 09:38 AM    ALKPHOS 48 2022 09:38 AM    AST 22 2022 09:38 AM    ALT <5 2022 09:38 AM     No results found for: PTINR  No results found for: LABA1C  No results found for: CKTOTAL, CKMB, CKMBINDEX, TROPONINI    Cardiac, Vascular and Imaging Data all Personally Reviewed in Detail by Myself      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    Moderate concentric left ventricular hypertrophy. Abnormal (paradoxical) septal motion consistent with postoperative state. The base of the inferior wall is hypokinetic   Left ventricular ejection fraction is in the normal range. Mild left atrial dilatation. Mechanical prosthetic aortic valve which is well seated   Mildly dilated proximal ascending aorta     ECHO 2020  Concentric LVH with normal LV size and wall motion. EF is   55%. Grade I  diastolic dysfunction with normal LV filling pressures. The left atrium is mildly dilated. A mechanical artificial aortic valve appears well seated with a mean  gradient of 17 mmHg. The right ventricle is normal in size and function.   A Coronary artery disease  Significant LAD and circumflex disease with heavy calcification. Asymptomatic. Will continue medical management with Asa, B-blocker, Imdur, statin, and warfarin. . I have advised him to call the office with any increased symptoms. Cardiomyopathy  LVEF 40-45%. Appears compensated today. Continue Asa and B-blocker. Mechanical AVR 1991 in [facility] on Coumadin (PCP managed). Dyspnea with exertion. Repeat echocardiogram yearly. Hypertension  Controlled. Continue current medical management. Follow up in 6 months for CAD and cardiomyopathy. Thank you for allowing us to participate in the care of Angela Mitchell. Please do not hesitate to contact me if you have any questions. Antonio Coronado MD, MPH    [address], [address] [place] [address] Santosh Olmedo [place] [address]  Ph: [phone]  Fax: [phone]      This note was scribed in the presence of Dr Ana Cisneros, by Den Baptiste RN  Physician Attestation:  The scribes documentation has been prepared under my direction and personally reviewed by me in its entirety. I confirm that the note above accurately reflects all work, treatment, procedures, and medical decision making performed by me.

## 2022-07-28 ENCOUNTER — OFFICE VISIT (OUTPATIENT)
Dept: CARDIOLOGY CLINIC | Age: 87
End: 2022-07-28
Payer: MEDICARE

## 2022-07-28 VITALS
WEIGHT: 166 LBS | OXYGEN SATURATION: 93 % | BODY MASS INDEX: 23.77 KG/M2 | HEART RATE: 56 BPM | HEIGHT: 70 IN | SYSTOLIC BLOOD PRESSURE: 94 MMHG | DIASTOLIC BLOOD PRESSURE: 50 MMHG

## 2022-07-28 DIAGNOSIS — I25.5 ISCHEMIC CARDIOMYOPATHY: ICD-10-CM

## 2022-07-28 DIAGNOSIS — Z95.2 HX OF ARTIFICIAL HEART VALVE REPLACEMENT: ICD-10-CM

## 2022-07-28 DIAGNOSIS — I25.10 CORONARY ARTERY DISEASE INVOLVING NATIVE CORONARY ARTERY OF NATIVE HEART WITHOUT ANGINA PECTORIS: Primary | ICD-10-CM

## 2022-07-28 DIAGNOSIS — I10 ESSENTIAL HYPERTENSION: ICD-10-CM

## 2022-07-28 PROCEDURE — 1123F ACP DISCUSS/DSCN MKR DOCD: CPT | Performed by: INTERNAL MEDICINE

## 2022-07-28 PROCEDURE — 99214 OFFICE O/P EST MOD 30 MIN: CPT | Performed by: INTERNAL MEDICINE

## 2022-08-15 ENCOUNTER — ANTI-COAG VISIT (OUTPATIENT)
Dept: PHARMACY | Age: 87
End: 2022-08-15
Payer: MEDICARE

## 2022-08-15 DIAGNOSIS — Z79.01 ANTICOAGULATED ON COUMADIN: Primary | ICD-10-CM

## 2022-08-15 DIAGNOSIS — Z95.2 HX OF ARTIFICIAL HEART VALVE REPLACEMENT: ICD-10-CM

## 2022-08-15 LAB — INR BLD: 2.6

## 2022-08-15 PROCEDURE — 85610 PROTHROMBIN TIME: CPT

## 2022-08-15 PROCEDURE — 99211 OFF/OP EST MAY X REQ PHY/QHP: CPT

## 2022-08-15 NOTE — PROGRESS NOTES
daily except 5 mg on Monday and Fridays    Call [phone] with signs or symptoms of bleeding or ANY medication changes (including over-the-counter medications or herbal supplements). If significant bleeding occurs please seek immediate medical attention. Keep the number of servings of vitamin K containing foods (dark green, leafy vegetables) the same each week. Please call if this changes. Salad every other day and a green vegetable most days of the week. He does not eat dark green leafy vegetables. Limit alcohol intake. He drinks a glass of wine most evenings. Please call if this changes. Immunization History   Administered Date(s) Administered    COVID-19, PFIZER PURPLE top, DILUTE for use, (age 15 y+), 30mcg/0.3mL 01/20/2021, 02/10/2021, 10/06/2021    Influenza, High Dose (Fluzone 65 yrs and older) 09/25/2020    Influenza, Quadv, adjuvanted, 65 yrs +, IM, PF (Fluad) 10/06/2021    Zoster Recombinant (Shingrix) 09/22/2020           Orders Placed This Encounter   Procedures    Protime-INR     This external order was created through the results console. No orders of the defined types were placed in this encounter. Reviewed AVS with patient / caregiver.     Billing Points:  0 billing points this visit       CLINICAL PHARMACY CONSULT: MED RECONCILIATION/REVIEW ADDENDUM    For Pharmacy Admin Tracking Only    Intervention Detail:   Total # of Interventions Recommended: 0  Total # of Interventions Accepted: 0  Time Spent (min): 15

## 2022-09-26 ENCOUNTER — ANTI-COAG VISIT (OUTPATIENT)
Dept: PHARMACY | Age: 87
End: 2022-09-26
Payer: MEDICARE

## 2022-09-26 DIAGNOSIS — Z95.2 HX OF ARTIFICIAL HEART VALVE REPLACEMENT: ICD-10-CM

## 2022-09-26 DIAGNOSIS — Z79.01 ANTICOAGULATED ON COUMADIN: Primary | ICD-10-CM

## 2022-09-26 LAB — INTERNATIONAL NORMALIZATION RATIO, POC: 4.3

## 2022-09-26 PROCEDURE — 99211 OFF/OP EST MAY X REQ PHY/QHP: CPT

## 2022-09-26 PROCEDURE — 85610 PROTHROMBIN TIME: CPT

## 2022-10-12 ENCOUNTER — OFFICE VISIT (OUTPATIENT)
Dept: FAMILY MEDICINE CLINIC | Age: 87
End: 2022-10-12
Payer: MEDICARE

## 2022-10-12 VITALS
SYSTOLIC BLOOD PRESSURE: 122 MMHG | HEIGHT: 70 IN | BODY MASS INDEX: 23.48 KG/M2 | DIASTOLIC BLOOD PRESSURE: 80 MMHG | WEIGHT: 164 LBS

## 2022-10-12 DIAGNOSIS — I50.22 CHRONIC SYSTOLIC (CONGESTIVE) HEART FAILURE (HCC): ICD-10-CM

## 2022-10-12 DIAGNOSIS — G20 PARKINSON DISEASE (HCC): Primary | ICD-10-CM

## 2022-10-12 DIAGNOSIS — R29.898 WEAKNESS OF BOTH LOWER EXTREMITIES: ICD-10-CM

## 2022-10-12 PROCEDURE — 99214 OFFICE O/P EST MOD 30 MIN: CPT | Performed by: FAMILY MEDICINE

## 2022-10-12 PROCEDURE — 1123F ACP DISCUSS/DSCN MKR DOCD: CPT | Performed by: FAMILY MEDICINE

## 2022-10-12 RX ORDER — WARFARIN SODIUM 10 MG/1
TABLET ORAL
Qty: 90 TABLET | Refills: 2 | Status: SHIPPED | OUTPATIENT
Start: 2022-10-12

## 2022-10-12 NOTE — PROGRESS NOTES
10/12/2022     Oz Rihcmond (:  1934) is a 80 y.o. male, here for evaluation of the following medical concerns:    HPI    Patient presented today for follow up concerning several chronic medical condistions. Overall, he feels well. 1.  Aortic valve- caumadin 10 mg  four times a week, 5 mg three times a week, last INR was wnl, he is wanting it checked today, recently saw his new  Cardiologist, feels well and doesn't have a concern today. He is being managed by the Coumadin clinic. 2.  Parkinsons disease- tremors at rest, ambulation is wnl, follows with Neurology at [facility], feels it is well controlled on the medication, denied a side effect from the medication, takes it as prescribed. 3.  \"1. 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 He has mild wearing off with return of tremor. Impaired sleep maintenance likely represents sub-optimal dopamine coverage overnight. \"    4.  Bilateral leg weakness- patient is having more balance issues and strength. Denied falling, will refer for PT. Today, denied chest pain, sob, n, v, or diarrhea. Review of Systems   Constitutional:  Positive for fatigue. Negative for activity change, fever and unexpected weight change. HENT:  Negative for congestion, ear pain, rhinorrhea, sinus pressure, sore throat and trouble swallowing. Respiratory:  Negative for cough and shortness of breath. Cardiovascular:  Negative for chest pain, palpitations and leg swelling. Gastrointestinal:  Negative for abdominal pain, diarrhea, nausea and vomiting. Endocrine: Negative for cold intolerance, heat intolerance, polydipsia and polyphagia. Musculoskeletal:  Positive for arthralgias and gait problem. Skin:  Negative for rash. Neurological:  Positive for weakness.  Negative for dizziness, syncope, light-headedness and headaches. Psychiatric/Behavioral:  Negative for dysphoric mood. The patient is not nervous/anxious. Prior to Visit Medications    Medication Sig Taking? Authorizing Provider   warfarin (COUMADIN) 10 MG tablet TAKE 1 TABLET DAILY EXCEPT 5 MG EVERY MONDAY AND FRIDAY OR AS DIRECTED BY [facility] [phone] Yes Tereso Chavez, DO   latanoprost (XALATAN) 0.005 % ophthalmic solution  Yes Historical Provider, MD   atorvastatin (LIPITOR) 40 MG tablet Take 1 tablet by mouth daily Yes Shyann Burgos MD   isosorbide mononitrate (IMDUR) 30 MG extended release tablet Take 1 tablet by mouth daily Yes Shyann Burgos MD   metoprolol succinate (TOPROL XL) 25 MG extended release tablet TAKE 1 TABLET NIGHTLY Yes Tereso Chavez DO   carbidopa-levodopa (SINEMET CR)  MG per extended release tablet Take 2 tablets by mouth nightly Yes Historical Provider, MD   carbidopa-levodopa (SINEMET)  MG per tablet Take 2 tablets by mouth 4 times daily  Patient taking differently: Take 2.5 tablets by mouth 4 times daily Yes Ibrahima Vazquez,    aspirin 81 MG tablet Take 81 mg by mouth daily Yes Historical Provider, MD        Social History     Tobacco Use    Smoking status: Never    Smokeless tobacco: Never   Substance Use Topics    Alcohol use: Yes     Alcohol/week: 1.0 standard drink     Types: 1 Glasses of wine per week     Comment: wine weekly        Vitals:    10/12/22 0950   BP: 122/80   Weight: 164 lb (74.4 kg)   Height: 5' 10\" (1.778 m)     Estimated body mass index is 23.53 kg/m² as calculated from the following:    Height as of this encounter: 5' 10\" (1.778 m). Weight as of this encounter: 164 lb (74.4 kg). Physical Exam  Vitals and nursing note reviewed. Constitutional:       Appearance: He is well-developed. HENT:      Head: Normocephalic and atraumatic.       Right Ear: External ear normal.      Left Ear: External ear normal.   Eyes:      Pupils: Pupils are equal, round, and reactive to light. Neck:      Thyroid: No thyromegaly. Cardiovascular:      Rate and Rhythm: Normal rate and regular rhythm. Heart sounds: No murmur heard. Pulmonary:      Effort: Pulmonary effort is normal.      Breath sounds: Normal breath sounds. No wheezing or rales. Abdominal:      General: Bowel sounds are normal.      Palpations: Abdomen is soft. Tenderness: There is no abdominal tenderness. Musculoskeletal:         General: Normal range of motion. Skin:     Findings: No rash. Neurological:      Mental Status: He is alert and oriented to person, place, and time. Psychiatric:         Behavior: Behavior normal.         Judgment: Judgment normal.       ASSESSMENT/PLAN:  1. Parkinson disease ([address]  Stable  Continue with medication  Keep appointments with specialist.   Jonel Jefferson questions     2. Chronic systolic (congestive) heart failure  Stable  Continue with medication  Keep appointments with specialist.   Jonel Jefferson questions     3. Weakness of both lower extremities  Referred for procedure  Educated on the importance of having this done. Answered questions   - R Kathi Goldsmith 114    Return in about 3 months (around 1/12/2023).

## 2022-10-21 ENCOUNTER — HOSPITAL ENCOUNTER (OUTPATIENT)
Dept: PHYSICAL THERAPY | Age: 87
Setting detail: THERAPIES SERIES
Discharge: HOME | End: 2022-10-21
Payer: MEDICARE

## 2022-10-21 PROCEDURE — 97162 PT EVAL MOD COMPLEX 30 MIN: CPT

## 2022-10-21 PROCEDURE — 97530 THERAPEUTIC ACTIVITIES: CPT

## 2022-10-21 PROCEDURE — 97110 THERAPEUTIC EXERCISES: CPT

## 2022-10-21 ASSESSMENT — ENCOUNTER SYMPTOMS
TROUBLE SWALLOWING: 0
SORE THROAT: 0
COUGH: 0
SHORTNESS OF BREATH: 0
RHINORRHEA: 0
NAUSEA: 0
DIARRHEA: 0
VOMITING: 0
ABDOMINAL PAIN: 0
SINUS PRESSURE: 0

## 2022-10-21 NOTE — FLOWSHEET NOTE
[facility]  [address] ([facility]) [address]  Phone: [phone]   Fax:     [phone]      Date:  10/21/2022    Patient Name:  Brittanie Deras    :  1934  MRN: [id number]    Pertinent Medical History:      Medical/Treatment Diagnosis Information:  Medical Diagnosis: Weakness of both lower extremities [R29.898]  Treatment Diagnosis: LE weakness/ thigh soreness, limited function, strength, gait instability    Insurance/Certification information:     Physician Information:  Candy Coles DO  Plan of care signed (Y/N):     Date of Patient follow up with Physician:      Progress Report: []  Yes  [x]  No     Date Range for reporting period:  Beginning: 10/21/2022  Ending:     Progress report due (10 Rx/or 30 days whichever is less): #35     Recertification due (POC duration/ or 90 days whichever is less):      Visit # Insurance/POC Allowable Auth Needed   1 / BMN []Yes    []No       Latex Allergy:  [x]NO      []YES  Preferred Language for Healthcare:   [x]English       []Other:    Functional Scale:      Date assessed: at eval  Test: FOTO  Score:    Pain level:  /10     History of Injury: Pt here w/ primary c/o of bilat leg weakness and gait instability. He started noticing leg weakness and gait instability last winter spring. He was diagnosed with parkinsons 5 yrs, on meds regularly. He is referred from PCP who wanted him to start PT due to leg soreness/ weakness with walking and unsteadiness reported with walking. The patient describes the symptoms as a soreness/ weakness in his bilateral thighs. Denies any back pain or radiating pain, pain does not radiate past his thighs, denies any cold sensation in lower legs. No problems ambulating stairs. Denies any episodes of chest pain with exertion, but does get more SOB with walking per his report (cardio aware of calcified/ blocked artery in heart). Thigh pain does not worsen with prolonged standing. Prior mgmt: 2 yrs ago attended parkinsons exercise class which he liked. He may be interested in finding a class closer to home. Prior function: >1 yr ago, used to walk 2 miles w/ spouse, now walks less w/ dog. Walking tolerance: 1/4 to 1/2 mile. Current Function:   -walks dog 1/2 mile (1 lap) 5x/wk started 6 months ago: no improvement in walking since starting.   - plays golf 1x/wk w/ son (rides in cart)  - regular home exercise: upper body wt ex's, ST hip abd w/ elastic band @ ankles. Neurologist did suggest parkinsons exercise class, but hasn't been back in 2 yrs since moving from [facility]. Lives in [place] w/ spouse, shares household chores. 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 SUBJECTIVE:  See eval    OBJECTIVE:  Observation:   Test measurements:      RESTRICTIONS/PRECAUTIONS: cardiac: coronary artery stenosis w/ SOB, hx aortic valve    Exercises/Interventions:     Therapeutic Ex (26379)   Min: Reps/Resistance Notes/CUES        Sit to stand 30s x 1    Lateral walk w/ band Pt edu         ST chops ~2# wt, bilat UE/ trunk diag         Pt edu HEP review, pt edu re: progressive ex', s/s to monitor for excessive SOB/ chest pain    Manual Intervention (14053)  Min:                    NMR re-education (85582)  Min:  CUES NEEDED   Tandem walking Pt edu    SLS >    Tandem stance >    Cone walking FWD, LAT: pt edu         Therapeutic Activity (40272)  Min:     Pt edu Thorough review of diff dx/ causes: muscular/ vascular/ lumbar, etc, prognosis, role of PT short term, and long term exercise to maintain/ progress physical function and encouraged home or parkinsons specific exercise program to facilitate long term mgmt. Modalities  Min:                           Other Therapeutic Activities: Pt was educated on PT POC, Diagnosis, Prognosis, pathomechanics as well as frequency and duration of scheduling future physical therapy appointments. Time was also taken on this day to answer all patient questions and participation in PT. Reviewed appointment policy in detail with patient and patient verbalized understanding. Home Exercise Program: Patient was instructed in the following for HEP:      10/21: Access Code: [email or web address]  URL: [email or web address]  Date: 10/21/2022  Prepared by: Te Vidal     Exercises  Tandem Walking - 2-3 x daily - 4-6 x weekly - 2-3 sets - 10-15 reps  Side Stepping with Resistance at Thighs - 2-3 x daily - 4-6 x weekly - 2-3 sets - 10-15 reps  Squat with Chair Touch - 2-3 x daily - 4-6 x weekly - 2-3 sets - 10-15 reps  Walking Forward Lunge - 2-3 x daily - 4-6 x weekly - 2-3 sets - 10-15 reps  Standing Diagonal Lift with Anchored Resistance - 2-3 x daily - 4-6 x weekly - 2-3 sets - 10-15 reps  Side Step Overs with Cones and Counter Support - 2-3 x daily - 4-6 x weekly - 2-3 sets - 10-15 reps         . Patient verbalized/demonstrated understanding and was issued written handout. Therapeutic Exercise and NMR EXR  [x] (04326) Provided verbal/tactile cueing for activities related to strengthening, flexibility, endurance, ROM for improvements in LE, proximal hip, and core control with self care, mobility, lifting, ambulation. [x] (69852) Provided verbal/tactile cueing for activities related to improving balance, coordination, kinesthetic sense, posture, motor skill, proprioception  to assist with LE, proximal hip, and core control in self care, mobility, lifting, ambulation and eccentric single leg control.      NMR and Therapeutic Activities:    [x] (66866 or 45191) Provided verbal/tactile cueing for activities related to improving balance, coordination, kinesthetic sense, posture, motor skill, proprioception and motor activation to allow for proper function of core, proximal hip and LE with self care and ADLs and functional mobility. [x] (02426) Gait Re-education- Provided training and instruction to the patient for proper LE, core and proximal hip recruitment and positioning and eccentric body weight control with ambulation re-education including up and down stairs     Home Exercise Program:    [x] (56068) Reviewed/Progressed HEP activities related to strengthening, flexibility, endurance, ROM of core, proximal hip and LE for functional self-care, mobility, lifting and ambulation/stair navigation   [x] (71689)Reviewed/Progressed HEP activities related to improving balance, coordination, kinesthetic sense, posture, motor skill, proprioception of core, proximal hip and LE for self care, mobility, lifting, and ambulation/stair navigation      Manual Treatments:  PROM / STM / Oscillations-Mobs:  G-I, II, III, IV (PA's, Inf., Post.)  [x] (10402) Provided manual therapy to mobilize LE, proximal hip and/or LS spine soft tissue/joints for the purpose of modulating pain, promoting relaxation,  increasing ROM, reducing/eliminating soft tissue swelling/inflammation/restriction, improving soft tissue extensibility and allowing for proper ROM for normal function with self care, mobility, lifting and ambulation.        Approval Dates:  CPT Code Units Approved Units Used  Date Updated:                     Charges:  Timed Code Treatment Minutes: 25   Total Treatment Minutes: 50      [] EVAL (LOW) 69148 (typically 20 minutes face-to-face)  [x] EVAL (MOD) 60551 (typically 30 minutes face-to-face)  [] EVAL (HIGH) 39844 (typically 45 minutes face-to-face)  [] RE-EVAL     [x] XA(95945) x     [] Dry needle 1 or 2 Muscles (35161)  [] NMR (91530) x     [] Dry needle 3+ Muscles (05842)  [] Manual (06249) x     [] Ultrasound (13057) x  [x] TA (67184) x     [] Mech Traction (07155)  [] ES(attended) (37105) [] ES (un) (71423):   [] Vasopump (22943) [] Ionto (27359)   [] Other:    Shayne Garcia stated goal: able to walk at least 3/4 mile w/o thigh pain  [] Progressing: [] Met: [] Not Met: [] Adjusted  Therapist goals for Patient:   Short Term Goals: To be achieved in: 2 weeks  1. Independent in HEP and progression per patient tolerance, in order to prevent re-injury. [] Progressing: [] Met: [] Not Met: [] Adjusted  2. Patient will have a decrease in pain to facilitate improvement in movement, function, and ADLs as indicated by Functional Deficits. [] Progressing: [] Met: [] Not Met: [] Adjusted    Long Term Goals: To be achieved in: up to 10 weeks  1. Increase FOTO functional outcome score from 49 to 57  to assist with reaching prior level of function. [] Progressing: [] Met: [] Not Met: [] Adjusted  2. Patient will demonstrate increased AROM to WNL, good LS mobility, good hip ROM to allow for proper joint functioning as indicated by patients Functional Deficits. [] Progressing: [] Met: [] Not Met: [] Adjusted  3. Patient will demonstrate an increase in Strength to good proximal hip and core activation  and able to perform at least 12 reps of sit to  30seconds. [] Progressing: [] Met: [] Not Met: [] Adjusted  4. Patient will be able to perform tandem and SLS for at least 10 seconds to demonstrate improved balance control. [] Progressing: [] Met: [] Not Met: [] Adjusted  5. Pt will perform TUG in less than 10sec to show improved gait control and stability and confidence. [] Progressing: [] Met: [] Not Met: [] Adjusted     ASSESSMENT:  See eval    Treatment/Activity Tolerance:  [x] Patient tolerated treatment well [] Patient limited by fatique  [] Patient limited by pain  [] Patient limited by other medical complications  [] Other:     Overall Progression Towards Functional goals/ Treatment Progress Update:  [] Patient is progressing as expected towards functional goals listed.     [] Progression is slowed due to complexities/Impairments listed. [] Progression has been slowed due to co-morbidities. [x] Plan just implemented, too soon to assess goals progression <30days   [] Goals require adjustment due to lack of progress  [] Patient is not progressing as expected and requires additional follow up with physician  [] Other    Prognosis for POC: [x] Good [] Fair  [] Poor    Patient requires continued skilled intervention: [x] Yes  [] No        PLAN:   [] Continue per plan of care [] Alter current plan (see comments)  [x] Plan of care initiated [] Hold pending MD visit [] Discharge    Electronically signed by: Rafiq Whitten PT , DPT  #[id number]      Note: If patient does not return for scheduled/recommended follow up visits, this note will serve as a discharge from care along with the most recent update on progress.

## 2022-10-21 NOTE — PLAN OF CARE
[facility]. [address]  Phone: [phone]   Fax:     [phone]                                                      Physical Therapy Certification    Dear Juan Aguilar DO,    We had the pleasure of evaluating the following patient for physical therapy services at [facility]. A summary of our findings can be found in the initial assessment below. This includes our plan of care. If you have any questions or concerns regarding these findings, please do not hesitate to contact me at the office phone number checked above. Thank you for the referral.       Physician Signature:_______________________________Date:__________________  By signing above (or electronic signature), therapists plan is approved by physician      Patient: Akbar Faulkner   : 1934   MRN: [id number]  Referring Physician: Juan Aguilar DO      Evaluation Date: 10/21/2022      Medical Diagnosis Information:  Medical Diagnosis: Weakness of both lower extremities [R29.898]   Treatment Diagnosis: LE weakness/ thigh soreness, limited function, strength, gait instability                                       Insurance information:        Precautions/ Contra-indications: hx aortic valve, coronary artery stenosis w/ SOB  Latex Allergy:  [x]NO      []YES  Preferred Language for Healthcare:   [x]English       []other:    C-SSRS Triggered by Intake questionnaire (Past 2 wk assessment ):   [x] No, Questionnaire did not trigger screening.   [] Yes, Patient intake triggered C-SSRS Screening      [] C-SSRS Screening completed  [] PCP notified via Epic     SUBJECTIVE: Patient stated complaint: Pt here w/ primary c/o of bilat leg weakness and gait instability. He started noticing leg weakness and gait instability last winter spring. He was diagnosed with parkinsons 5 yrs, on meds regularly.  He is referred from PCP who wanted him to start PT due to leg soreness/ weakness with walking and unsteadiness reported with walking. The patient describes the symptoms as a soreness/ weakness in his bilateral thighs. Denies any back pain or radiating pain, pain does not radiate past his thighs, denies any cold sensation in lower legs. No problems ambulating stairs. Denies any episodes of chest pain with exertion, but does get more SOB with walking per his report (cardio aware of calcified/ blocked artery in heart). Thigh pain does not worsen with prolonged standing. Prior mgmt: 2 yrs ago attended parkinsons exercise class which he liked. He may be interested in finding a class closer to home. Prior function: >1 yr ago, used to walk 2 miles w/ spouse, now walks less w/ dog. Walking tolerance: 1/4 to 1/2 mile. Current Function:   -walks dog 1/2 mile (1 lap) 5x/wk started 6 months ago: no improvement in walking since starting.   - plays golf 1x/wk w/ son (rides in cart)  - regular home exercise: upper body wt ex's, ST hip abd w/ elastic band @ ankles. Neurologist did suggest parkinsons exercise class, but hasn't been back in 2 yrs since moving from [facility]. Lives in [place] w/ spouse, shares household chores. *Goals:   - walk >1/2 mi w/o thigh soreness  - initiate ex program: general wellness/ parkinsons/ balance    Relevant Medical History: he recently found out that he has signficant blockage in his LAD artery, but he cant have a stent and it will be managed medically. Functional Outcome Measure: FOTO = 49    Pain Scale: 1/10 at rest, 5/10 after walking 1/2 mile. Easing factors: avoiding prolonged walking. Provocative factors: walking > 1/2 mile causes leg sx, prolonged standing/ cooking causes L shldr soreness.       Type: []Constant   [x]Intermittent  []Radiating []Localized []other:     Numbness/Tingling: denies    Occupation/School: retired    Living Status/Prior Level of Function: Independent with ADLs and IADLs, lives w/ spouse    OBJECTIVE:   Posture: mild fwd head posture    Functional Mobility/Transfers: able to rise from chair w/o hands. Palpation: no tenderness to lateral hip greater troch or lumbar spine with PA. Gait: (include devices/WB status) WFL, no obvious antalgia noted. Mild fwd trunk lean, no steppage gait    Bandages/Dressings/Incisions: NA    Repeated Movements:unremarkable     ROM  Comments   Lumbar Flex WFL    Lumbar Ext WFL      ROM LEFT RIGHT Comments   Lumbar Side Bend   WFL bilat   Lumbar Rotation   Mild deficit to L   Quadrant      Hip Flexion      Hip Abd      Hip ER      Hip IR      Hip Extension      Knee Ext      Knee Flex      Hamstring Flex      Piriformis      David test                Myotomes/Strength Normal Abnormal Comments   []ALL NORMAL      Hip Abd   Mild deficit on L   Hip Ext      Hip flexion (L1-L2 femoral) [] []    Knee extension (L2-L4 femoral) [x] []    Knee flexion (S1 sciatic)      Dorsiflexion (L4-L5 deep peroneal) [] []    Great Toe Ext (L5 deep peroneal nerve) [] []    Ankle Eversion (S1-S2 super peroneal) [] []    Ankle PF(S1-S2 tibial) [] []    Multifidus [] []    Transverse Ab [] []      Dermatomes Normal Abnormal Comments   []ALL NORMAL            inguinal area (L1)  [] []    anterior mid-thigh (L2) [] []    distal ant thigh/med knee (L3) [] []    medial lower leg and foot (L4) [] []    lateral lower leg and foot (L5) [] []    posterior calf (S1) [] []    medial calcaneus (S2) [] []      Reflexes Normal Abnormal Comments   [x]ALL NORMAL            S1-2 Seated achilles [x] []    S1-2 Prone knee bend [] []    L3-4 Patellar tendon [x] []    C5-6 Biceps [] []    C6 Brachioradialis [] []    C7-8 Triceps [] []    Clonus [] []    Babinski [] []    Mukherjee's [] []      Joint mobility: lumbar spine PA assessment non provocative. Lumbar AROM is grossly WFL all planes and again nonprovocative for his thigh pain. []Normal    []Hypo   []Hyper    Neurodynamics:     Orthopedic Special Tests:      Tandem balance: >10sec  SLS: R=10s, L=7s  TU.5sec  30s STS: 11x      Neural dynamic tension testing Normal Abnormal Comments   Slump Test  - Degree of knee flexion:  [x] []    SLR  [x] []    0-30 [] []    30-70 [] []    Femoral nerve (L2-4) [] []       Normal Abnormal N/A Comments   Fwd Bend-aberrant or innominate mvmt) [] [] []    Trendelenburg [x] [] [] Mild difficulty on L   Kemps/Quadrant [] [] []    Stork [] [] []    BOGDAN/Jak [x] [] [] Slight deficit on L   Hip scour [x] [] []    Supine to sit [] [] []    Prone knee bend [] [] []           Hip thrust [] [] []    SI distraction/compression [] [] []    Sacral Spring/thrust [] [] []               [x] Patient history, allergies, meds reviewed. Medical chart reviewed. See intake form. Review Of Systems (ROS):  [x]Performed Review of systems (Integumentary, CardioPulmonary, Neurological) by intake and observation. Intake form has been scanned into medical record. Patient has been instructed to contact their primary care physician regarding ROS issues if not already being addressed at this time. Co-morbidities/Complexities (which will affect course of rehabilitation):  has a past medical history of Colon polyps, Mechanical heart valve present, and S/P aortic valve replacement with metallic valve.     []None           Arthritic conditions   []Rheumatoid arthritis (M05.9)  []Osteoarthritis (M19.91)   Cardiovascular conditions   []Hypertension (I10)  []Hyperlipidemia (E78.5)  []Angina pectoris (I20)  []Atherosclerosis (I70)  []CVA Musculoskeletal conditions   []Disc pathology   []Congenital spine pathologies   []Prior surgical intervention  []Osteoporosis (M81.8)  []Osteopenia (M85.8)   Endocrine conditions   []Hypothyroid (E03.9)  []Hyperthyroid Gastrointestinal conditions   []Constipation (U14.18)   Metabolic conditions   []Morbid obesity (E66.01)  []Diabetes type 1(E10.65) or 2 (E11.65)   []Neuropathy (G60.9)     Pulmonary conditions   []Asthma (J45)  []Coughing   []COPD (J44.9)   Psychological Disorders  []Anxiety (F41.9)  []Depression (F32.9)   []Other:   [x]Other:     Parkinsons disease  Cardiac hx      Barriers to/and or personal factors that will affect rehab potential:              [x]Age  []Sex    []Smoker              []Motivation/Lack of Motivation                        [x]Co-Morbidities              []Cognitive Function, education/learning barriers              []Environmental, home barriers              []profession/work barriers  [x]past PT/medical experience  []other:  Justification:     Falls Risk Assessment (30 days):   [x] Falls Risk assessed and no intervention required.   [] Falls Risk assessed and Patient requires intervention due to being higher risk   TUG score (>12s at risk):     [] Falls education provided, including:         ASSESSMENT:   Functional Impairments:     []Noted lumbar/proximal hip hypomobility   []Noted lumbosacral and/or generalized hypermobility   []Decreased Lumbosacral/hip/LE functional ROM   [x]Decreased core/proximal hip strength and neuromuscular control    [x]Decreased LE functional strength    []Abnormal reflexes/sensation/myotomal/dermatomal deficits  [x]Reduced balance/proprioceptive control    []other:      Functional Activity Limitations (from functional questionnaire and intake)   [x]Reduced ability to tolerate prolonged functional positions   []Reduced ability or difficulty with changes of positions or transfers between positions   []Reduced ability to maintain good posture and demonstrate good body mechanics with sitting, bending, and lifting   []Reduced ability to sleep   [] Reduced ability or tolerance with driving and/or computer work   []Reduced ability to perform lifting, reaching, carrying tasks   []Reduced ability to squat   []Reduced ability to forward bend   [x]Reduced ability to ambulate prolonged functional periods/distances/surfaces   []Reduced ability to ascend/descend stairs   []other:       Participation Restrictions   []Reduced participation in self care activities   [x]Reduced participation in home management activities   [x]Reduced participation in work activities   [x]Reduced participation in social activities. []Reduced participation in sport/recreational activities. Classification:   []Signs/symptoms consistent with Lumbar instability/stabilization subgroup. []Signs/symptoms consistent with Lumbar mobilization/manipulation subgroup, myotomes and dermatomes intact. Meets manipulation criteria. []Signs/symptoms consistent with Lumbar direction specific/centralization subgroup   []Signs/symptoms consistent with Lumbar traction subgroup       []Signs/symptoms consistent with lumbar facet dysfunction   []Signs/symptoms consistent with lumbar stenosis type dysfunction   []Signs/symptoms consistent with nerve root involvement including myotome & dermatome dysfunction   []Signs/symptoms consistent with post-surgical status including: decreased ROM, strength and function. []signs/symptoms consistent with pathology which may benefit from Dry needling     [x]other:  s/s consistent w/ likely functional decline and parkinsons related deficits.      Prognosis/Rehab Potential:      [x]Excellent   []Good    []Fair   []Poor    Tolerance of evaluation/treatment:    [x]Excellent   []Good    []Fair   []Poor     Physical Therapy Evaluation Complexity Justification  [x] A history of present problem with:  [] no personal factors and/or comorbidities that impact the plan of care;  [x]1-2 personal factors and/or comorbidities that impact the plan of care  []3 personal factors and/or comorbidities that impact the plan of care  [x] An examination of body systems using standardized tests and measures addressing any of the following: body structures and functions (impairments), activity limitations, and/or participation restrictions;:  [] a total of 1-2 or more elements   [x] a total of 3 or more elements   [] a total of 4 or more elements   [x] A clinical presentation with:  [x] stable and/or uncomplicated characteristics   [] evolving clinical presentation with changing characteristics  [] unstable and unpredictable characteristics;   [x] Clinical decision making of [] low, [] moderate, [] high complexity using standardized patient assessment instrument and/or measurable assessment of functional outcome. [] EVAL (LOW) 06837 (typically 20 minutes face-to-face)  [x] EVAL (MOD) 83479 (typically 30 minutes face-to-face)  [] EVAL (HIGH) 23141 (typically 45 minutes face-to-face)  [] RE-EVAL     PLAN:     Frequency/Duration:  up to 2 days per week for up to 6-10 Weeks:  Interventions:  [x]  Therapeutic exercise including: strength training, ROM, for LE, Glutes and core   [x]  NMR activation and proprioception for glutes , LE and Core   [x]  Manual therapy as indicated for Hip complex, LE and spine to include: Dry Needling/IASTM, STM, PROM, Gr I-IV mobilizations, manipulation. [x]  Modalities as needed that may include: thermal agents, E-stim, Biofeedback, US, iontophoresis as indicated  [x]  Patient education on joint protection, postural re-education, activity modification, progression of HEP. HEP instruction: Access Code: [email or web address]  URL: [email or web address]  Date: 10/21/2022  Prepared by: Josué Swan    Exercises  Tandem Walking - 2-3 x daily - 4-6 x weekly - 2-3 sets - 10-15 reps  Side Stepping with Resistance at Thighs - 2-3 x daily - 4-6 x weekly - 2-3 sets - 10-15 reps  Squat with Chair Touch - 2-3 x daily - 4-6 x weekly - 2-3 sets - 10-15 reps  Walking Forward Lunge - 2-3 x daily - 4-6 x weekly - 2-3 sets - 10-15 reps  Standing Diagonal Lift with Anchored Resistance - 2-3 x daily - 4-6 x weekly - 2-3 sets - 10-15 reps  Side Step Overs with Cones and Counter Support - 2-3 x daily - 4-6 x weekly - 2-3 sets - 10-15 reps      GOALS:  Patient stated goal: able to walk at least 3/4 mile w/o thigh pain  [] Progressing: [] Met: [] Not Met: [] Adjusted  Therapist goals for Patient:   Short Term Goals: To be achieved in: 2 weeks  1. Independent in HEP and progression per patient tolerance, in order to prevent re-injury. [] Progressing: [] Met: [] Not Met: [] Adjusted  2. Patient will have a decrease in pain to facilitate improvement in movement, function, and ADLs as indicated by Functional Deficits. [] Progressing: [] Met: [] Not Met: [] Adjusted    Long Term Goals: To be achieved in: up to 10 weeks  1. Increase FOTO functional outcome score from 49 to 57  to assist with reaching prior level of function. [] Progressing: [] Met: [] Not Met: [] Adjusted  2. Patient will demonstrate increased AROM to WNL, good LS mobility, good hip ROM to allow for proper joint functioning as indicated by patients Functional Deficits. [] Progressing: [] Met: [] Not Met: [] Adjusted  3. Patient will demonstrate an increase in Strength to good proximal hip and core activation  and able to perform at least 12 reps of sit to  30seconds. [] Progressing: [] Met: [] Not Met: [] Adjusted  4. Patient will be able to perform tandem and SLS for at least 10 seconds to demonstrate improved balance control. [] Progressing: [] Met: [] Not Met: [] Adjusted  5. Pt will perform TUG in less than 10sec to show improved gait control and stability and confidence. [] Progressing: [] Met: [] Not Met: [] Adjusted     Electronically signed by: Kavitha Sorensen PT , DPT  #[id number]          Note: If patient does not return for scheduled/recommended follow up visits, this note will serve as a discharge from care along with the most recent update on progress.

## 2022-10-25 ENCOUNTER — HOSPITAL ENCOUNTER (OUTPATIENT)
Dept: PHYSICAL THERAPY | Age: 87
Setting detail: THERAPIES SERIES
Discharge: HOME | End: 2022-10-25
Payer: MEDICARE

## 2022-10-25 PROCEDURE — 97110 THERAPEUTIC EXERCISES: CPT

## 2022-10-25 PROCEDURE — 97530 THERAPEUTIC ACTIVITIES: CPT

## 2022-10-25 NOTE — FLOWSHEET NOTE
[facility] - Outpatient Rehabilitation & Therapy  [address] ([facility]. [address]  Phone: [phone]   Fax:     [phone]      Date:  10/25/2022    Patient Name:  Jennifer Medley    :  1934  MRN: [id number]    Pertinent Medical History:      Medical/Treatment Diagnosis Information:  Medical Diagnosis: Weakness of both lower extremities [R29.898]  Treatment Diagnosis: LE weakness/ thigh soreness, limited function, strength, gait instability    Insurance/Certification information:     Physician Information:  Sebastian Quispe DO  Plan of care signed (Y/N):     Date of Patient follow up with Physician:      Progress Report: []  Yes  [x]  No     Date Range for reporting period:  Beginning: 10/21/2022  Ending:     Progress report due (10 Rx/or 30 days whichever is less): #20     Recertification due (POC duration/ or 90 days whichever is less):      Visit # Insurance/POC Allowable Auth Needed   2 / BMN []Yes    []No       Latex Allergy:  [x]NO      []YES  Preferred Language for Healthcare:   [x]English       []Other:    Functional Scale:      Date assessed: at eval  Test: FOTO  Score:    Pain level:  /10     History of Injury: Pt here w/ primary c/o of bilat leg weakness and gait instability. He started noticing leg weakness and gait instability last winter spring. He was diagnosed with parkinsons 5 yrs, on meds regularly. He is referred from PCP who wanted him to start PT due to leg soreness/ weakness with walking and unsteadiness reported with walking. The patient describes the symptoms as a soreness/ weakness in his bilateral thighs. Denies any back pain or radiating pain, pain does not radiate past his thighs, denies any cold sensation in lower legs. No problems ambulating stairs. Denies any episodes of chest pain with exertion, but does get more SOB with walking per his report (cardio aware of calcified/ blocked artery in heart). Thigh pain does not worsen with prolonged standing. Prior mgmt: 2 yrs ago attended parkinsons exercise class which he liked. He may be interested in finding a class closer to home. Prior function: >1 yr ago, used to walk 2 miles w/ spouse, now walks less w/ dog.   - last winter; sedentary  - in spring started walking: had post leg pain; improved over time. Walking tolerance: 1/4 to 1/2 mile. Pain would start after approx 300-400 yds. Current Function:   -walks dog 1/2 mile (1 lap) 5x/wk started 6 months ago: no improvement in walking since starting.   - plays golf 1x/wk w/ son (rides in cart)  - regular home exercise: upper body wt ex's, ST hip abd w/ elastic band @ ankles. Neurologist did suggest parkinsons exercise class, but hasn't been back in 2 yrs since moving from [facility]. Lives in [address] [place] [address] w/ spouse, shares household chores. 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 SUBJECTIVE:    10/25: pt reports doing ok. He hasn't walked a long distance to test his posterior thigh symptoms. He has been doing the home ex's which is giving him some quad soreness. He does state that over the spring, his symptoms did improve gradually as he got more active.  He has had a few weeks off from exercising, but he is interested in re-starting some type of regular exercise program.     OBJECTIVE:  Observation:   Test measurements:    10/25: lumbar AROM: full all directions, no pain    RESTRICTIONS/PRECAUTIONS: cardiac: coronary artery stenosis w/ SOB, hx aortic valve    Exercises/Interventions:     Therapeutic Ex (04942)   Min: Reps/Resistance Notes/CUES   treadmill 2.0-2.3mph x 5' Min/ mild SOB, no leg sx   Sit to stand 5# 2x10    Lateral walk w/ band Pt edu ST chops         Upper quarter exercise Wall push ups x10  Seated RB row x 10 (blue)              Pt edu Thorough HEP review/ update; see below; education re: programmin min a week (30min x 5 days)    Manual Intervention (21370)  Min:                    NMR re-education (69707)  Min:  CUES NEEDED   Tandem walking    SLS >    Tandem stance >    [facility] walking         Therapeutic Activity (25174)  Min:     Pt edu Review of pathology, role of progressive/ regular exercise to maximize function and fitness, educated on options from silver sneakers to parkinsons exercise class to well rounded PT home program.         Modalities  Min:                           Other Therapeutic Activities: Pt was educated on PT POC, Diagnosis, Prognosis, pathomechanics as well as frequency and duration of scheduling future physical therapy appointments. Time was also taken on this day to answer all patient questions and participation in PT. Reviewed appointment policy in detail with patient and patient verbalized understanding. Home Exercise Program: Patient was instructed in the following for HEP:      10/21: Access Code: [email or web address]  URL: [email or web address]  Date: 10/21/2022  Prepared by: Josué Swan     Exercises  Tandem Walking - 2-3 x daily - 4-6 x weekly - 2-3 sets - 10-15 reps  Side Stepping with Resistance at Thighs - 2-3 x daily - 4-6 x weekly - 2-3 sets - 10-15 reps  Squat with Chair Touch - 2-3 x daily - 4-6 x weekly - 2-3 sets - 10-15 reps  Walking Forward Lunge - 2-3 x daily - 4-6 x weekly - 2-3 sets - 10-15 reps  Standing Diagonal Lift with Anchored Resistance - 2-3 x daily - 4-6 x weekly - 2-3 sets - 10-15 reps  Side Step Overs with Cones and Counter Support - 2-3 x daily - 4-6 x weekly - 2-3 sets - 10-15 reps    10/25: add wall push up, seated TB row         . Patient verbalized/demonstrated understanding and was issued written handout.       Therapeutic Exercise and NMR EXR  [x] (84747) Provided verbal/tactile cueing for activities related to strengthening, flexibility, endurance, ROM for improvements in LE, proximal hip, and core control with self care, mobility, lifting, ambulation. [x] (61072) Provided verbal/tactile cueing for activities related to improving balance, coordination, kinesthetic sense, posture, motor skill, proprioception  to assist with LE, proximal hip, and core control in self care, mobility, lifting, ambulation and eccentric single leg control. NMR and Therapeutic Activities:    [x] (03853 or 24322) Provided verbal/tactile cueing for activities related to improving balance, coordination, kinesthetic sense, posture, motor skill, proprioception and motor activation to allow for proper function of core, proximal hip and LE with self care and ADLs and functional mobility.    [x] (53421) Gait Re-education- Provided training and instruction to the patient for proper LE, core and proximal hip recruitment and positioning and eccentric body weight control with ambulation re-education including up and down stairs     Home Exercise Program:    [x] (88194) Reviewed/Progressed HEP activities related to strengthening, flexibility, endurance, ROM of core, proximal hip and LE for functional self-care, mobility, lifting and ambulation/stair navigation   [x] (47135)Reviewed/Progressed HEP activities related to improving balance, coordination, kinesthetic sense, posture, motor skill, proprioception of core, proximal hip and LE for self care, mobility, lifting, and ambulation/stair navigation      Manual Treatments:  PROM / STM / Oscillations-Mobs:  G-I, II, III, IV (PA's, Inf., Post.)  [x] (11286) Provided manual therapy to mobilize LE, proximal hip and/or LS spine soft tissue/joints for the purpose of modulating pain, promoting relaxation,  increasing ROM, reducing/eliminating soft tissue swelling/inflammation/restriction, improving soft tissue extensibility and allowing for proper ROM for normal function with self care, mobility, lifting and ambulation. Approval Dates:  CPT Code Units Approved Units Used  Date Updated:                     Charges:  Timed Code Treatment Minutes: 40   Total Treatment Minutes: 40      [] EVAL (LOW) 74266 (typically 20 minutes face-to-face)  [] EVAL (MOD) 27925 (typically 30 minutes face-to-face)  [] EVAL (HIGH) 38449 (typically 45 minutes face-to-face)  [] RE-EVAL     [x] AB(01090) x 2    [] Dry needle 1 or 2 Muscles (26636)  [] NMR (65462) x     [] Dry needle 3+ Muscles (84786)  [] Manual (36664) x     [] Ultrasound (24369) x  [x] TA (14996) x     [] Mech Traction (52877)  [] ES(attended) (12025)     [] ES (un) (24115):   [] Vasopump (50405) [] Ionto (21582)   [] Other:    [place] Every stated goal: able to walk at least 3/4 mile w/o thigh pain  [] Progressing: [] Met: [] Not Met: [] Adjusted  Therapist goals for Patient:   Short Term Goals: To be achieved in: 2 weeks  1. Independent in HEP and progression per patient tolerance, in order to prevent re-injury. [] Progressing: [] Met: [] Not Met: [] Adjusted  2. Patient will have a decrease in pain to facilitate improvement in movement, function, and ADLs as indicated by Functional Deficits. [] Progressing: [] Met: [] Not Met: [] Adjusted    Long Term Goals: To be achieved in: up to 10 weeks  1. Increase FOTO functional outcome score from 49 to 57  to assist with reaching prior level of function. [] Progressing: [] Met: [] Not Met: [] Adjusted  2. Patient will demonstrate increased AROM to WNL, good LS mobility, good hip ROM to allow for proper joint functioning as indicated by patients Functional Deficits. [] Progressing: [] Met: [] Not Met: [] Adjusted  3. Patient will demonstrate an increase in Strength to good proximal hip and core activation  and able to perform at least 12 reps of sit to  30seconds. [] Progressing: [] Met: [] Not Met: [] Adjusted  4.  Patient will be able to perform tandem and SLS for at least 10 seconds to demonstrate improved balance control. [] Progressing: [] Met: [] Not Met: [] Adjusted  5. Pt will perform TUG in less than 10sec to show improved gait control and stability and confidence. [] Progressing: [] Met: [] Not Met: [] Adjusted     ASSESSMENT:  Pt needs skilled PT to restore functional mobility/ activity tolerance and faciliate safe progression towards self mgmt with a home program.     Sandie session well; unable to provoke his symptoms with brisk walk on TM, which he states would likely have caused pain in the recent past. Significant portion spent on education/ review of home program, programming info, recommended exercise dosage per week and types of exercise that are optimal.     Treatment/Activity Tolerance:  [x] Patient tolerated treatment well [] Patient limited by fatique  [] Patient limited by pain  [] Patient limited by other medical complications  [] Other:     Overall Progression Towards Functional goals/ Treatment Progress Update:  [] Patient is progressing as expected towards functional goals listed. [] Progression is slowed due to complexities/Impairments listed. [] Progression has been slowed due to co-morbidities. [x] Plan just implemented, too soon to assess goals progression <30days   [] Goals require adjustment due to lack of progress  [] Patient is not progressing as expected and requires additional follow up with physician  [] Other    Prognosis for POC: [x] Good [] Fair  [] Poor    Patient requires continued skilled intervention: [x] Yes  [] No        PLAN:   [] Continue per plan of care [] Alter current plan (see comments)  [x] Plan of care initiated [] Hold pending MD visit [] Discharge    Electronically signed by: Kunal Krueger PT , DPT  #[id number]      Note: If patient does not return for scheduled/recommended follow up visits, this note will serve as a discharge from care along with the most recent update on progress.

## 2022-11-01 ENCOUNTER — HOSPITAL ENCOUNTER (OUTPATIENT)
Dept: PHYSICAL THERAPY | Age: 87
Setting detail: THERAPIES SERIES
Discharge: HOME | End: 2022-11-01
Payer: MEDICARE

## 2022-11-01 PROCEDURE — 97110 THERAPEUTIC EXERCISES: CPT

## 2022-11-01 PROCEDURE — 97530 THERAPEUTIC ACTIVITIES: CPT

## 2022-11-01 NOTE — FLOWSHEET NOTE
[facility] - Outpatient Rehabilitation & Therapy  [address] ([facility]. [address]  Phone: [phone]   Fax:     [phone]      Date:  2022    Patient Name:  Brittanie Deras    :  1934  MRN: [id number]    Pertinent Medical History:      Medical/Treatment Diagnosis Information:  Medical Diagnosis: Weakness of both lower extremities [R29.898]  Treatment Diagnosis: LE weakness/ thigh soreness, limited function, strength, gait instability    Insurance/Certification information:     Physician Information:  Candy Coles DO  Plan of care signed (Y/N):     Date of Patient follow up with Physician:      Progress Report: []  Yes  [x]  No     Date Range for reporting period:  Beginning: 10/21/2022  Ending:     Progress report due (10 Rx/or 30 days whichever is less): #13     Recertification due (POC duration/ or 90 days whichever is less):      Visit # Insurance/POC Allowable Auth Needed   3 / BMN []Yes    []No       Latex Allergy:  [x]NO      []YES  Preferred Language for Healthcare:   [x]English       []Other:    Functional Scale:      Date assessed: at eval  Test: FOTO (47-55)  Score:  : 52    Pain level:  /10     History of Injury: Pt here w/ primary c/o of bilat leg weakness and gait instability. He started noticing leg weakness and gait instability last winter spring. He was diagnosed with parkinsons 5 yrs, on meds regularly. He is referred from PCP who wanted him to start PT due to leg soreness/ weakness with walking and unsteadiness reported with walking. The patient describes the symptoms as a soreness/ weakness in his bilateral thighs. Denies any back pain or radiating pain, pain does not radiate past his thighs, denies any cold sensation in lower legs. No problems ambulating stairs.  Denies any episodes of chest pain with exertion, but does get more SOB with walking per his report (cardio aware of calcified/ blocked artery in heart). Thigh pain does not worsen with prolonged standing. Prior mgmt: 2 yrs ago attended parkinsons exercise class which he liked. He may be interested in finding a class closer to home. Prior function: >1 yr ago, used to walk 2 miles w/ spouse, now walks less w/ dog.   - last winter; sedentary  - in spring started walking: had post leg pain; improved over time. Walking tolerance: 1/4 to 1/2 mile. Pain would start after approx 300-400 yds. Current Function:   -walks dog 1/2 mile (1 lap) 5x/wk started 6 months ago: no improvement in walking since starting.   - plays golf 1x/wk w/ son (rides in cart)  - regular home exercise: upper body wt ex's, ST hip abd w/ elastic band @ ankles. Neurologist did suggest parkinsons exercise class, but hasn't been back in 2 yrs since moving from [facility]. Lives in [place] w/ spouse, shares household chores. 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 SUBJECTIVE:    10/25: pt reports doing ok. He hasn't walked a long distance to test his posterior thigh symptoms. He has been doing the home ex's which is giving him some quad soreness. He does state that over the spring, his symptoms did improve gradually as he got more active. He has had a few weeks off from exercising, but he is interested in re-starting some type of regular exercise program.   11/1: walked dog 1/2 mile today w/o any of his familiar posterior leg pain. He actually hasn't had that pain recently. He has been consistent w/ home program, and has not yet looked into local parkinsons exercise classes, but he plans to do that in the future.  He cannot report any specific problem as of late, and he feels confident with current home program. He does feel possibly parkinsons is progressing as when its time to take another dose of his meds, he notices the shaking is a bit worse. I suggested he consult w/ specialist about this, but also he could cont to strongly focus on parkinsons specific exercises to see if this helps him prevent worsening of symptoms. OBJECTIVE:  Observation:   Test measurements:    10/25: lumbar AROM: full all directions, no pain    ROM Eval   10/21 2022                                                            Strength                                                            TESTS/ OTHER          30s STS   (age adjusted cut off=8x) 11x 15x        TUG 11.5sec 8. 3sec        SLS R=10s  L=7s R=12s  L=8s                                RESTRICTIONS/PRECAUTIONS: cardiac: coronary artery stenosis w/ SOB, hx aortic valve    Exercises/Interventions:     Therapeutic Ex (56773)   Min: Reps/Resistance Notes/CUES   treadmill Min/ mild SOB, no leg sx   Sit to stand    Lateral walk w/ band        ST chops        Upper quarter exercise              Pt edu Performed functional tests again today, Again Thorough HEP review/ update; see below; education re: programmin min a week (30min x 5 days)    Manual Intervention (40522)  Min:                    NMR re-education (36208)  Min:  CUES NEEDED   Tandem walking    SLS >    Tandem stance >    [facility] walking         Therapeutic Activity (08293)  Min:     Pt edu pt edu for norms and goals for each activity, benefits of home program and specific parkinsons targeted progressive exercise program for long-term benefit/ mgmt of his problems. Modalities  Min:                           Other Therapeutic Activities: Pt was educated on PT POC, Diagnosis, Prognosis, pathomechanics as well as frequency and duration of scheduling future physical therapy appointments. Time was also taken on this day to answer all patient questions and participation in PT.  Reviewed appointment policy in detail with patient and patient verbalized understanding. Home Exercise Program: Patient was instructed in the following for HEP:      10/21: Access Code: [email or web address]  URL: [email or web address]  Date: 10/21/2022  Prepared by: Giulia Gonzalez     Exercises  Tandem Walking - 2-3 x daily - 4-6 x weekly - 2-3 sets - 10-15 reps  Side Stepping with Resistance at Thighs - 2-3 x daily - 4-6 x weekly - 2-3 sets - 10-15 reps  Squat with Chair Touch - 2-3 x daily - 4-6 x weekly - 2-3 sets - 10-15 reps  Walking Forward Lunge - 2-3 x daily - 4-6 x weekly - 2-3 sets - 10-15 reps  Standing Diagonal Lift with Anchored Resistance - 2-3 x daily - 4-6 x weekly - 2-3 sets - 10-15 reps  Side Step Overs with Cones and Counter Support - 2-3 x daily - 4-6 x weekly - 2-3 sets - 10-15 reps    10/25: add wall push up, seated TB row         . Patient verbalized/demonstrated understanding and was issued written handout. Therapeutic Exercise and NMR EXR  [x] (35305) Provided verbal/tactile cueing for activities related to strengthening, flexibility, endurance, ROM for improvements in LE, proximal hip, and core control with self care, mobility, lifting, ambulation. [x] (70846) Provided verbal/tactile cueing for activities related to improving balance, coordination, kinesthetic sense, posture, motor skill, proprioception  to assist with LE, proximal hip, and core control in self care, mobility, lifting, ambulation and eccentric single leg control. NMR and Therapeutic Activities:    [x] (67203 or 36000) Provided verbal/tactile cueing for activities related to improving balance, coordination, kinesthetic sense, posture, motor skill, proprioception and motor activation to allow for proper function of core, proximal hip and LE with self care and ADLs and functional mobility.    [x] (01017) Gait Re-education- Provided training and instruction to the patient for proper LE, core and proximal hip recruitment and positioning and eccentric tolerance, in order to prevent re-injury. [x] Progressing: [] Met: [] Not Met: [] Adjusted  2. Patient will have a decrease in pain to facilitate improvement in movement, function, and ADLs as indicated by Functional Deficits. [x] Progressing: [] Met: [] Not Met: [] Adjusted    Long Term Goals: To be achieved in: up to 10 weeks  1. Increase FOTO functional outcome score from 49 to (55 - adjusted 11/1 TB)  to assist with reaching prior level of function. [x] Progressing: [] Met: [] Not Met: [] Adjusted  2. Patient will demonstrate increased AROM to WNL, good LS mobility, good hip ROM to allow for proper joint functioning as indicated by patients Functional Deficits. [x] Progressing: [] Met: [] Not Met: [] Adjusted  3. Patient will demonstrate an increase in Strength to good proximal hip and core activation  and able to perform at least 12 reps of sit to  30seconds. [x] Progressing: [x] Met: [] Not Met: [] Adjusted  4. Patient will be able to perform tandem and SLS for at least 10 seconds to demonstrate improved balance control. [x] Progressing: [] Met: [] Not Met: [] Adjusted  5. Pt will perform TUG in less than 10sec to show improved gait control and stability and confidence. [x] Progressing: [x] Met: [] Not Met: [] Adjusted     ASSESSMENT:  Pt needs skilled PT to restore functional mobility/ activity tolerance and faciliate safe progression towards self mgmt with a home program.     Pt has improved significantly with functional tests, except for L Single leg stance. He is independent with current home program and I had strongly recommended he seek out an specialized parkinsons exercise class to participate in for its social and physical benefits, and he stated that he would look into it. At this point, he is safe to continue on his own and will f/u prn.      Treatment/Activity Tolerance:  [x] Patient tolerated treatment well [] Patient limited by fatique  [] Patient limited by pain  [] Patient limited by other medical complications  [] Other:     Overall Progression Towards Functional goals/ Treatment Progress Update:  [] Patient is progressing as expected towards functional goals listed. [] Progression is slowed due to complexities/Impairments listed. [] Progression has been slowed due to co-morbidities. [x] Plan just implemented, too soon to assess goals progression <30days   [] Goals require adjustment due to lack of progress  [] Patient is not progressing as expected and requires additional follow up with physician  [] Other    Prognosis for POC: [x] Good [] Fair  [] Poor    Patient requires continued skilled intervention: [x] Yes  [] No        PLAN:   11/1: cont on his own for up to a month, return to PT if having problems. If no contact after a month, we will plan to D/C this episode of care. [] Continue per plan of care [] Alter current plan (see comments)  [x] Plan of care initiated [] Hold pending MD visit [] Discharge    Electronically signed by: Mina Fragoso PT , DPT  #[id number]      Note: If patient does not return for scheduled/recommended follow up visits, this note will serve as a discharge from care along with the most recent update on progress.

## 2022-11-03 ENCOUNTER — APPOINTMENT (OUTPATIENT)
Dept: PHYSICAL THERAPY | Age: 87
End: 2022-11-03
Payer: MEDICARE

## 2022-11-07 ENCOUNTER — ANTI-COAG VISIT (OUTPATIENT)
Dept: PHARMACY | Age: 87
End: 2022-11-07
Payer: MEDICARE

## 2022-11-07 DIAGNOSIS — Z95.2 HX OF ARTIFICIAL HEART VALVE REPLACEMENT: ICD-10-CM

## 2022-11-07 DIAGNOSIS — Z79.01 ANTICOAGULATED ON COUMADIN: Primary | ICD-10-CM

## 2022-11-07 LAB — INTERNATIONAL NORMALIZATION RATIO, POC: 3.8

## 2022-11-07 PROCEDURE — 85610 PROTHROMBIN TIME: CPT

## 2022-11-07 PROCEDURE — 99211 OFF/OP EST MAY X REQ PHY/QHP: CPT

## 2022-11-07 NOTE — PROGRESS NOTES
Carla Arguello is a 80 y.o. here for warfarin management. Sheila Diallo had an INR test today. Results were reviewed and appropriate warfarin management was completed. This visit was performed as: An in person visit. Protocols were followed with precautions to reduce the spread of COVID-19. Patient verifies current warfarin dosing regimen: Yes     Warfarin medication reviewed and updated on the patient 's home medication list: Yes   All other medications reviewed and updated on the patient 's home medication list: Yes     Lab Results   Component Value Date    INR 3.8 11/07/2022    INR 4.3 09/26/2022    INR 2.60 08/15/2022           Anticoagulation Summary  As of 11/7/2022      INR goal:  2.5-3.5   TTR:  60.7 % (1.8 y)   INR used for dosing:  3.8 (11/7/2022)   Warfarin maintenance plan:  5 mg (10 mg x 0.5) every Mon, Fri; 10 mg (10 mg x 1) all other days; Starting 11/7/2022   Weekly warfarin total:  60 mg   Plan last modified:  Mecca Holden RPH (4/16/2021)   Next INR check:  12/19/2022   Priority:  Maintenance   Target end date: Indefinite    Indications    Anticoagulated on Coumadin [Z79.01]  Hx of artificial heart valve replacement [Z95.2]                 Anticoagulation Episode Summary       INR check location:      Preferred lab:      Send INR reminders to:  [facility]    Comments:  [facility]          Anticoagulation Care Providers       Provider Role Specialty Phone number    Jeronimo Charles DO Referring Family Medicine [phone]            There were no vitals taken for this visit. Warfarin assessment / plan:     Appears well  Supra-therapeutic INR. Encouraged to eat more greens this week    Description    Hold today and take 5mg tomorrow then  CONTINUE: Warfarin 10 mg daily except 5 mg on Monday and Fridays    Call [phone] with signs or symptoms of bleeding or ANY medication changes (including over-the-counter medications or herbal supplements).        If significant bleeding occurs please seek immediate medical attention. Keep the number of servings of vitamin K containing foods (dark green, leafy vegetables) the same each week. Please call if this changes. Salad every other day and a green vegetable most days of the week. He does not eat dark green leafy vegetables. Limit alcohol intake. He drinks a glass of wine most evenings. Please call if this changes. Immunization History   Administered Date(s) Administered    COVID-19, PFIZER Bivalent BOOSTER, (age 12y+), IM, 30 mcg/0.3 mL dose 10/13/2022    COVID-19, PFIZER PURPLE top, DILUTE for use, (age 15 y+), 30mcg/0.3mL 01/20/2021, 02/10/2021, 10/06/2021    Influenza, FLUAD, (age 72 y+), Adjuvanted, 0.5mL 10/06/2021    Influenza, High Dose (Fluzone 65 yrs and older) 09/25/2020    Zoster Recombinant (Shingrix) 09/22/2020           Orders Placed This Encounter   Procedures    POCT INR     This external order was created through the results console. No orders of the defined types were placed in this encounter. Reviewed AVS with patient / caregiver.     Billing Points:  Basic Education (disease state, med overview, expected symptoms) - 1 point  Adjust dosage and/or reconcile meds (fill pill box) </= 5 medications - 2 points       CLINICAL PHARMACY CONSULT: MED RECONCILIATION/REVIEW ADDENDUM    For Pharmacy Admin Tracking Only    Intervention Detail: Dose Adjustment: 2, reason: Therapy Optimization  Total # of Interventions Recommended: 2  Total # of Interventions Accepted: 2  Time Spent (min): 20

## 2022-11-08 ENCOUNTER — APPOINTMENT (OUTPATIENT)
Dept: PHYSICAL THERAPY | Age: 87
End: 2022-11-08
Payer: MEDICARE

## 2022-11-09 ENCOUNTER — TELEPHONE (OUTPATIENT)
Dept: CARDIOLOGY CLINIC | Age: 87
End: 2022-11-09

## 2022-11-09 NOTE — TELEPHONE ENCOUNTER
Denia Barakat from [place] [id number] called in to leave a message in regards to Daily Garcia.  Denia Barakat states: \"Dr. Adam Mcclendon would like to set up some time to talk to Mansfield Boas, she will be in office tomorrow and can set aside some time to speak with him after 3:30-4 pm.\"    Dr. Lillie Kohler can be reached at: [phone]

## 2022-12-07 ENCOUNTER — TELEPHONE (OUTPATIENT)
Dept: CARDIOLOGY CLINIC | Age: 87
End: 2022-12-07

## 2022-12-07 RX ORDER — METOPROLOL SUCCINATE 25 MG/1
12.5 TABLET, EXTENDED RELEASE ORAL DAILY
Qty: 90 TABLET | Refills: 3
Start: 2022-12-07

## 2022-12-07 RX ORDER — ISOSORBIDE MONONITRATE 30 MG/1
15 TABLET, EXTENDED RELEASE ORAL DAILY
Qty: 90 TABLET | Refills: 3 | Status: SHIPPED
Start: 2022-12-07

## 2022-12-07 NOTE — TELEPHONE ENCOUNTER
Received call from [facility] with worsening glaucoma with stable pressures-concern from optic nerve perfusion     Will liberalize BP parameters    Spoke with patient   Decrease Imdur to 15 mg daily and Toprol XL to 12.5 mg daily.       Patient educated to call if SBP > 160 or if recurrence of chest pain     Pt  verbalized understanding

## 2022-12-19 ENCOUNTER — ANTI-COAG VISIT (OUTPATIENT)
Dept: PHARMACY | Age: 87
End: 2022-12-19
Payer: MEDICARE

## 2022-12-19 DIAGNOSIS — Z95.2 HX OF ARTIFICIAL HEART VALVE REPLACEMENT: ICD-10-CM

## 2022-12-19 DIAGNOSIS — Z79.01 ANTICOAGULATED ON COUMADIN: Primary | ICD-10-CM

## 2022-12-19 LAB — INR BLD: 2.4

## 2022-12-19 PROCEDURE — 85610 PROTHROMBIN TIME: CPT | Performed by: SPEECH-LANGUAGE PATHOLOGIST

## 2022-12-19 PROCEDURE — 99211 OFF/OP EST MAY X REQ PHY/QHP: CPT | Performed by: SPEECH-LANGUAGE PATHOLOGIST

## 2022-12-19 NOTE — PROGRESS NOTES
Jose Balbuena is a 80 y.o. here for warfarin management. Iva Brock had an INR test today. Results were reviewed and appropriate warfarin management was completed. This visit was performed as: An in person visit. Protocols were followed with precautions to reduce the spread of COVID-19. Patient verifies current warfarin dosing regimen: Yes     Warfarin medication reviewed and updated on the patient 's home medication list: Yes   All other medications reviewed and updated on the patient 's home medication list: No: no changes      Lab Results   Component Value Date    INR 2.40 2022    INR 3.8 2022    INR 4.3 2022       Patient Findings       Negatives:  Signs/symptoms of bleeding, Missed doses, Extra doses, Change in medications, Change in diet/appetite, Bruising            Anticoagulation Summary  As of 2022      INR goal:  2.5-3.5   TTR:  61.3 % (1.9 y)   INR used for dosin.40 (2022)   Warfarin maintenance plan:  5 mg (10 mg x 0.5) every Mon, Fri; 10 mg (10 mg x 1) all other days; Starting 2022   Weekly warfarin total:  60 mg   Plan last modified:  Mecca Holden [facility] (2021)   Next INR check:  2023   Priority:  Maintenance   Target end date: Indefinite    Indications    Anticoagulated on Coumadin [Z79.01]  Hx of artificial heart valve replacement [Z95.2]                 Anticoagulation Episode Summary       INR check location:      Preferred lab:      Send INR reminders to:  [facility]    Comments:  [facility]          Anticoagulation Care Providers       Provider Role Specialty Phone number    Porsha Hilario DO Referring Family Medicine [phone]            There were no vitals taken for this visit. Warfarin assessment / plan:     Appears well  Sub-therapeutic INR     Denies missed doses. Denies increased vitamin K intake. Denies medication changes. Patient reports no changes since last visit that would result in a lower INR. Recommended to take 10 mg (1 tablet) Today ONLY then resume normal dose of 10 mg (1 tablet) daily except 5 mg (1/2 tablet) Monday and Friday. Advised patient to come back in 4-5 weeks instead of the normal 6 weeks. Description    Take 10 mg TODAY ONLY     THEN CONTINUE: Warfarin 10 mg daily except 5 mg on Monday and Fridays    Call [phone] with signs or symptoms of bleeding or ANY medication changes (including over-the-counter medications or herbal supplements). If significant bleeding occurs please seek immediate medical attention. Keep the number of servings of vitamin K containing foods (dark green, leafy vegetables) the same each week. Please call if this changes. Salad every other day and a green vegetable most days of the week. He does not eat dark green leafy vegetables. Limit alcohol intake. He drinks a glass of wine most evenings. Please call if this changes. Immunization History   Administered Date(s) Administered    COVID-19, PFIZER Bivalent BOOSTER, DO NOT Dilute, (age 12y+), IM, 30 mcg/0.3 mL 10/13/2022    COVID-19, PFIZER PURPLE top, DILUTE for use, (age 15 y+), 30mcg/0.3mL 01/20/2021, 02/10/2021, 10/06/2021    Influenza, FLUAD, (age 72 y+), Adjuvanted, 0.5mL 10/06/2021    Influenza, High Dose (Fluzone 65 yrs and older) 09/25/2020    Zoster Recombinant (Shingrix) 09/22/2020           Orders Placed This Encounter   Procedures    Protime-INR     This external order was created through the results console. No orders of the defined types were placed in this encounter. Reviewed AVS with patient / caregiver.     Billing Points:  Completion of RX assistance forms - 1 point  Adjust dosage and/or reconcile meds (fill pill box) </= 5 medications - 2 points       CLINICAL PHARMACY CONSULT: MED RECONCILIATION/REVIEW ADDENDUM    For Pharmacy Admin Tracking Only    Intervention Detail: Dose Adjustment: 1, reason: Therapy Optimization  Total # of Interventions Recommended: 1  Total # of Interventions Accepted: 1  Time Spent (min): 15

## 2023-01-10 DIAGNOSIS — E78.5 HYPERLIPIDEMIA, UNSPECIFIED HYPERLIPIDEMIA TYPE: ICD-10-CM

## 2023-01-10 DIAGNOSIS — I50.22 CHRONIC SYSTOLIC (CONGESTIVE) HEART FAILURE (HCC): ICD-10-CM

## 2023-01-10 DIAGNOSIS — Z95.2 HX OF ARTIFICIAL HEART VALVE REPLACEMENT: ICD-10-CM

## 2023-01-10 LAB
BASOPHILS ABSOLUTE: 0 K/UL (ref 0–0.2)
BASOPHILS RELATIVE PERCENT: 0.2 %
CHOLESTEROL, TOTAL: 113 MG/DL (ref 0–199)
EOSINOPHILS ABSOLUTE: 0 K/UL (ref 0–0.6)
EOSINOPHILS RELATIVE PERCENT: 0.4 %
HCT VFR BLD CALC: 45.1 % (ref 40.5–52.5)
HDLC SERPL-MCNC: 76 MG/DL (ref 40–60)
HEMOGLOBIN: 14.6 G/DL (ref 13.5–17.5)
LDL CHOLESTEROL CALCULATED: 24 MG/DL
LYMPHOCYTES ABSOLUTE: 1.4 K/UL (ref 1–5.1)
LYMPHOCYTES RELATIVE PERCENT: 22.1 %
MCH RBC QN AUTO: 30.2 PG (ref 26–34)
MCHC RBC AUTO-ENTMCNC: 32.4 G/DL (ref 31–36)
MCV RBC AUTO: 93.1 FL (ref 80–100)
MONOCYTES ABSOLUTE: 0.6 K/UL (ref 0–1.3)
MONOCYTES RELATIVE PERCENT: 9.9 %
NEUTROPHILS ABSOLUTE: 4.4 K/UL (ref 1.7–7.7)
NEUTROPHILS RELATIVE PERCENT: 67.4 %
PDW BLD-RTO: 15.6 % (ref 12.4–15.4)
PLATELET # BLD: 219 K/UL (ref 135–450)
PMV BLD AUTO: 9.1 FL (ref 5–10.5)
RBC # BLD: 4.84 M/UL (ref 4.2–5.9)
TRIGL SERPL-MCNC: 66 MG/DL (ref 0–150)
VLDLC SERPL CALC-MCNC: 13 MG/DL
WBC # BLD: 6.5 K/UL (ref 4–11)

## 2023-01-12 ENCOUNTER — OFFICE VISIT (OUTPATIENT)
Dept: FAMILY MEDICINE CLINIC | Age: 88
End: 2023-01-12

## 2023-01-12 VITALS
DIASTOLIC BLOOD PRESSURE: 78 MMHG | BODY MASS INDEX: 23.34 KG/M2 | SYSTOLIC BLOOD PRESSURE: 118 MMHG | HEIGHT: 70 IN | WEIGHT: 163 LBS

## 2023-01-12 DIAGNOSIS — I50.22 CHRONIC SYSTOLIC (CONGESTIVE) HEART FAILURE (HCC): ICD-10-CM

## 2023-01-12 DIAGNOSIS — E78.5 HYPERLIPIDEMIA, UNSPECIFIED HYPERLIPIDEMIA TYPE: ICD-10-CM

## 2023-01-12 DIAGNOSIS — Z95.2 HX OF ARTIFICIAL HEART VALVE REPLACEMENT: ICD-10-CM

## 2023-01-12 DIAGNOSIS — G20 PARKINSON DISEASE (HCC): Primary | ICD-10-CM

## 2023-01-12 LAB
A/G RATIO: 2.1 (ref 1.1–2.2)
ALBUMIN SERPL-MCNC: 4.1 G/DL (ref 3.4–5)
ALP BLD-CCNC: 59 U/L (ref 40–129)
ALT SERPL-CCNC: 7 U/L (ref 10–40)
ANION GAP SERPL CALCULATED.3IONS-SCNC: 15 MMOL/L (ref 3–16)
AST SERPL-CCNC: 25 U/L (ref 15–37)
BILIRUB SERPL-MCNC: 0.7 MG/DL (ref 0–1)
BUN BLDV-MCNC: 17 MG/DL (ref 7–20)
CALCIUM SERPL-MCNC: 9.7 MG/DL (ref 8.3–10.6)
CHLORIDE BLD-SCNC: 104 MMOL/L (ref 99–110)
CO2: 23 MMOL/L (ref 21–32)
CREAT SERPL-MCNC: 0.8 MG/DL (ref 0.8–1.3)
GFR SERPL CREATININE-BSD FRML MDRD: >60 ML/MIN/{1.73_M2}
GLUCOSE BLD-MCNC: 74 MG/DL (ref 70–99)
POTASSIUM SERPL-SCNC: 4.6 MMOL/L (ref 3.5–5.1)
SODIUM BLD-SCNC: 142 MMOL/L (ref 136–145)
TOTAL PROTEIN: 6.1 G/DL (ref 6.4–8.2)

## 2023-01-12 ASSESSMENT — PATIENT HEALTH QUESTIONNAIRE - PHQ9
SUM OF ALL RESPONSES TO PHQ QUESTIONS 1-9: 0
SUM OF ALL RESPONSES TO PHQ QUESTIONS 1-9: 0
1. LITTLE INTEREST OR PLEASURE IN DOING THINGS: 0
SUM OF ALL RESPONSES TO PHQ9 QUESTIONS 1 & 2: 0
SUM OF ALL RESPONSES TO PHQ QUESTIONS 1-9: 0
SUM OF ALL RESPONSES TO PHQ QUESTIONS 1-9: 0
2. FEELING DOWN, DEPRESSED OR HOPELESS: 0

## 2023-01-12 NOTE — PROGRESS NOTES
2023     Oz Richmond (:  1934) is a 80 y.o. male, here for evaluation of the following medical concerns:    HPI  Patient presented today for follow up concerning several chronic medical condistions. Overall, he feels well. 1.  Aortic valve- caumadin 10 mg  four times a week, 5 mg three times a week, last INR was wnl, he is wanting it checked today, recently saw his new  Cardiologist, feels well and doesn't have a concern today. He is being managed by the Coumadin clinic. 2.  Parkinsons disease- tremors at rest, ambulation is wnl, follows with Neurology at [facility], feels it is not well controlled on the medication, denied a side effect from the medication, admitted that he only takes 3.5 tablets instead of 4 due to fatigue. 3.  Bilateral leg weakness- patient is having more balance issues and strength. Denied falling, will refer for PT. Today, denied chest pain, sob, n, v, or diarrhea. Review of Systems   Constitutional:  Negative for activity change, fatigue, fever and unexpected weight change. HENT:  Negative for congestion, ear pain, facial swelling, hearing loss, rhinorrhea, sinus pressure, sore throat and trouble swallowing. Eyes:  Negative for visual disturbance. Respiratory:  Negative for cough and shortness of breath. Cardiovascular:  Negative for chest pain, palpitations and leg swelling. Gastrointestinal:  Negative for abdominal pain, diarrhea, nausea and vomiting. Endocrine: Negative for cold intolerance, heat intolerance, polydipsia and polyphagia. Musculoskeletal:  Positive for arthralgias, gait problem and myalgias. Skin:  Negative for rash. Neurological:  Positive for tremors and weakness. Negative for dizziness, light-headedness and headaches. Psychiatric/Behavioral:  Negative for dysphoric mood. The patient is not nervous/anxious. Prior to Visit Medications    Medication Sig Taking?  Authorizing Provider   isosorbide mononitrate (IMDUR) 30 MG extended release tablet Take 0.5 tablets by mouth daily Yes [facility], APRN - CNP   metoprolol succinate (TOPROL XL) 25 MG extended release tablet Take 0.5 tablets by mouth daily Yes DYLLAN Bach CNP   warfarin (COUMADIN) 10 MG tablet TAKE 1 TABLET DAILY EXCEPT 5 MG EVERY MONDAY AND FRIDAY OR AS DIRECTED BY [facility] [phone] Yes Tereso Chavez, DO   latanoprost (XALATAN) 0.005 % ophthalmic solution  Yes Historical Provider, MD   atorvastatin (LIPITOR) 40 MG tablet Take 1 tablet by mouth daily Yes Hollie Juarez MD   carbidopa-levodopa (SINEMET CR)  MG per extended release tablet Take 2 tablets by mouth nightly Yes Historical Provider, MD   carbidopa-levodopa (SINEMET)  MG per tablet Take 2 tablets by mouth 4 times daily  Patient taking differently: Take 2.5 tablets by mouth 4 times daily Yes Porsha Hilario,    aspirin 81 MG tablet Take 81 mg by mouth daily Yes Historical Provider, MD        Social History     Tobacco Use    Smoking status: Never    Smokeless tobacco: Never   Substance Use Topics    Alcohol use: Yes     Alcohol/week: 1.0 standard drink     Types: 1 Glasses of wine per week     Comment: wine weekly        Vitals:    01/12/23 0939   BP: 118/78   Weight: 163 lb (73.9 kg)   Height: 5' 10\" (1.778 m)     Estimated body mass index is 23.39 kg/m² as calculated from the following:    Height as of this encounter: 5' 10\" (1.778 m). Weight as of this encounter: 163 lb (73.9 kg). Physical Exam  Vitals and nursing note reviewed. Constitutional:       Appearance: He is well-developed. HENT:      Head: Normocephalic and atraumatic. Right Ear: External ear normal.      Left Ear: External ear normal.   Neck:      Thyroid: No thyromegaly. Cardiovascular:      Rate and Rhythm: Normal rate and regular rhythm. Heart sounds: No murmur heard. Pulmonary:      Effort: Pulmonary effort is normal.      Breath sounds: Normal breath sounds.  No wheezing or rales. Abdominal:      General: Bowel sounds are normal.      Palpations: Abdomen is soft. Tenderness: There is no abdominal tenderness. Musculoskeletal:      Cervical back: Neck supple. Lymphadenopathy:      Cervical: No cervical adenopathy. Skin:     Findings: No rash. Neurological:      Mental Status: He is alert and oriented to person, place, and time. Psychiatric:         Behavior: Behavior normal.         Judgment: Judgment normal.       ASSESSMENT/PLAN:  1. Parkinson disease ([address]  Stable  Continue with medication  Keep appointments with specialist.   Raiza Catena questions     2. Chronic systolic (congestive) heart failure (HCC)  Stable  Continue with medication  Keep appointments with specialist.   Raiza Catena questions     3. Hx of artificial heart valve replacement  Stable  Continue with medication  Keep appointments with specialist.   Raiza Catena questions     Return in about 3 months (around 4/12/2023).

## 2023-01-15 ASSESSMENT — ENCOUNTER SYMPTOMS
COUGH: 0
SHORTNESS OF BREATH: 0
NAUSEA: 0
DIARRHEA: 0
RHINORRHEA: 0
TROUBLE SWALLOWING: 0
FACIAL SWELLING: 0
ABDOMINAL PAIN: 0
SORE THROAT: 0
SINUS PRESSURE: 0
VOMITING: 0

## 2023-01-23 ENCOUNTER — ANTI-COAG VISIT (OUTPATIENT)
Dept: PHARMACY | Age: 88
End: 2023-01-23
Payer: MEDICARE

## 2023-01-23 DIAGNOSIS — Z79.01 ANTICOAGULATED ON COUMADIN: Primary | ICD-10-CM

## 2023-01-23 DIAGNOSIS — Z95.2 HX OF ARTIFICIAL HEART VALVE REPLACEMENT: ICD-10-CM

## 2023-01-23 LAB — INR BLD: 2.7

## 2023-01-23 PROCEDURE — 99211 OFF/OP EST MAY X REQ PHY/QHP: CPT

## 2023-01-23 PROCEDURE — 85610 PROTHROMBIN TIME: CPT

## 2023-01-23 NOTE — PROGRESS NOTES
Radha Colunga is a 80 y.o. here for warfarin management. Maritza Wheeler had an INR test today. Results were reviewed and appropriate warfarin management was completed. This visit was performed as: An in person visit. Protocols were followed with precautions to reduce the spread of COVID-19. Patient verifies current warfarin dosing regimen: Yes     Warfarin medication reviewed and updated on the patient 's home medication list: Yes   All other medications reviewed and updated on the patient 's home medication list: No: No medication changes reported. Lab Results   Component Value Date    INR 2.70 2023    INR 2.40 2022    INR 3.8 2022       Patient Findings       Negatives:  Signs/symptoms of thrombosis, Signs/symptoms of bleeding, Change in health, Change in alcohol use, Change in activity, Upcoming invasive procedure, Emergency department visit, Upcoming dental procedure, Missed doses, Extra doses, Change in medications, Change in diet/appetite, Hospital admission, Bruising            Anticoagulation Summary  As of 2023      INR goal:  2.5-3.5   TTR:  61.6 % (2 y)   INR used for dosin.70 (2023)   Warfarin maintenance plan:  5 mg (10 mg x 0.5) every Mon, Fri; 10 mg (10 mg x 1) all other days; Starting 2023   Weekly warfarin total:  60 mg   Plan last modified:  Mecca Holden RPH (2021)   Next INR check:  3/6/2023   Priority:  Maintenance   Target end date: Indefinite    Indications    Anticoagulated on Coumadin [Z79.01]  Hx of artificial heart valve replacement [Z95.2]                 Anticoagulation Episode Summary       INR check location:      Preferred lab:      Send INR reminders to:  [facility]FF    Comments:  [facility]  Consent: 23          Anticoagulation Care Providers       Provider Role Specialty Phone number    Kory Rasheed DO Referring Family Medicine [phone]            There were no vitals taken for this visit.     Warfarin assessment / plan:     Appears well. No changes affecting warfarin therapy were noted. No acute findings. INR within goal range. No change to warfarin dosing today. Description    Take 10 mg TODAY ONLY     THEN CONTINUE: Warfarin 10 mg daily except 5 mg on Monday and Fridays    Call [phone] with signs or symptoms of bleeding or ANY medication changes (including over-the-counter medications or herbal supplements). If significant bleeding occurs please seek immediate medical attention. Keep the number of servings of vitamin K containing foods (dark green, leafy vegetables) the same each week. Please call if this changes. Salad every other day and a green vegetable most days of the week. He does not eat dark green leafy vegetables. Limit alcohol intake. He drinks a glass of wine most evenings. Please call if this changes. Immunization History   Administered Date(s) Administered    COVID-19, PFIZER Bivalent BOOSTER, DO NOT Dilute, (age 12y+), IM, 30 mcg/0.3 mL 10/13/2022    COVID-19, PFIZER PURPLE top, DILUTE for use, (age 15 y+), 30mcg/0.3mL 01/20/2021, 02/10/2021, 10/06/2021    Influenza, FLUAD, (age 72 y+), Adjuvanted, 0.5mL 10/06/2021    Influenza, High Dose (Fluzone 65 yrs and older) 09/25/2020    Zoster Recombinant (Shingrix) 09/22/2020           Orders Placed This Encounter   Procedures    Protime-INR     This external order was created through the results console. No orders of the defined types were placed in this encounter. Reviewed AVS with patient / caregiver.     Billing Points:  0 billing points this visit       CLINICAL PHARMACY CONSULT: MED RECONCILIATION/REVIEW ADDENDUM    For Pharmacy Admin Tracking Only    Intervention Detail:   Total # of Interventions Recommended: 0  Total # of Interventions Accepted: 0  Time Spent (min): 15    Thank you,    Ruddy Soni, PharmD Candidate [id number]

## 2023-01-23 NOTE — PROGRESS NOTES
[address]  Cardiology Consult    Oz Richmond  2/19/1934 January 26, 2023    PCP: Dr. Yamini Real   Former Cardiologist: Dr. Boris Moreau     Reason for Referral: hx of AVR 1991    CC: some edema       Subjective:     History of Present Illness:    Oz Richmond is a 80 y.o. patient with a PMH significant for Parkinson's, HTN, mechanical AVR 1991 in [address] [place] [address] on Coumadin (PCP managed), Aspirin, Toprol-xl  therapy. Today, he is here for follow up for CAD. He continues to exercise on a regular basis without limitations. He is concerned about his medication changes that were made by his eye doctor. Patient denies exertional chest pain/pressure, dyspnea at rest, DALTON, PND, orthopnea, palpitations, lightheadedness, weight changes, changes in LE edema, and syncope. Patient reports compliance to his medications. Past Medical History:   has a past medical history of Colon polyps, Mechanical heart valve present, and S/P aortic valve replacement with metallic valve. Surgical History:   has a past surgical history that includes Aortic valve replacement; Colonoscopy (2008); Colonoscopy (08/01/2012); eye surgery; Colonoscopy (2018); and Diagnostic Cardiac Cath Lab Procedure (05/09/2022). Social History:   reports that he has never smoked. He has never used smokeless tobacco. He reports current alcohol use of about 1.0 standard drink per week. He reports that he does not use drugs. Family History:  family history includes Breast Cancer in his mother and sister; Cancer in his mother; Heart Disease in his mother; Parkinsonism in his father; Prostate Cancer in his maternal grandfather; Stomach Cancer in his paternal grandfather. Home Medications:  Were reviewed and are listed in nursing record and/or below  Prior to Admission medications    Medication Sig Start Date End Date Taking?  Authorizing Provider   isosorbide mononitrate (IMDUR) 30 MG extended release tablet Take 0.5 tablets by mouth daily 12/7/22  Yes DYLLAN Palacios CNP   metoprolol succinate (TOPROL XL) 25 MG extended release tablet Take 0.5 tablets by mouth daily 12/7/22  Yes DYLLAN Palacios CNP   warfarin (COUMADIN) 10 MG tablet TAKE 1 TABLET DAILY EXCEPT 5 MG EVERY MONDAY AND FRIDAY OR AS DIRECTED BY [facility] [phone] 10/12/22  Yes Tereso Chavez DO   atorvastatin (LIPITOR) 40 MG tablet Take 1 tablet by mouth daily 5/26/22  Yes Avel Wolff MD   carbidopa-levodopa (SINEMET CR)  MG per extended release tablet Take 2 tablets by mouth nightly   Yes Historical Provider, MD   carbidopa-levodopa (SINEMET)  MG per tablet Take 2 tablets by mouth 4 times daily  Patient taking differently: Take 2.5 tablets by mouth 4 times daily 6/18/20  Yes Tereso Chavez DO   aspirin 81 MG tablet Take 81 mg by mouth daily   Yes Historical Provider, MD   latanoprost (XALATAN) 0.005 % ophthalmic solution  4/21/22   Historical Provider, MD        CURRENT Medications:  No current facility-administered medications for this visit.      Allergies:  Amlodipine and Codeine           Review of Systems: SEE HPI   Constitutional: no unanticipated weight loss. There's been no change in energy level, sleep pattern, or activity level. No fevers, chills.   Eyes: No visual changes or diplopia. No scleral icterus.  ENT: No Headaches, hearing loss or vertigo. No mouth sores or sore throat.  Cardiovascular: No Chest pain, tightness or discomfort.   No Shortness of breath.   No Dyspnea on exertion, Orthopnea, Paroxysmal nocturnal dyspnea or breathlessness at rest.  No Palpitations.  No Syncope ('blackouts', 'faints', 'collapse') or dizziness.   Respiratory: No cough or wheezing, no sputum production. No hematemesis.    Gastrointestinal: No abdominal pain, appetite loss, blood in stools. No change in bowel or bladder habits.  Genitourinary: No dysuria, trouble voiding, or hematuria.  Musculoskeletal:  No gait disturbance, no  joint complaints. Integumentary: No rash or pruritis. Neurological: No headache, diplopia, change in muscle strength, numbness or tingling. Psychiatric: No anxiety or depression. Endocrine: No temperature intolerance. No excessive thirst, fluid intake, or urination. No tremor. Hematologic/Lymphatic: No abnormal bruising or bleeding, blood clots or swollen lymph nodes. Allergic/Immunologic: No nasal congestion or hives. Objective:     PHYSICAL EXAM:      Vitals:    01/26/23 1118   BP: 120/70   Pulse: 62   SpO2: 98%   Weight: 162 lb (73.5 kg)          Weight: 162 lb (73.5 kg)       General Appearance:  Alert, cooperative, no distress, appears stated age. Head:  Normocephalic, without obvious abnormality, atraumatic. Eyes:  Pupils equal and round. No scleral icterus. Mouth: Moist mucosa, no pharyngeal erythema. Nose: Nares normal. No drainage or sinus tenderness. Neck: Supple, symmetrical, trachea midline. No adenopathy. No tenderness/mass/nodules. No carotid bruit or elevated JVD. Lungs:   Respiratory Effort: Normal   Auscultation: Clear to auscultation bilaterally, respirations unlabored. No wheeze, rales   Chest Wall:  No tenderness or deformity. Cardiovascular:    Pulses  Palpation: normal   Ascultation: Regular rate, mechanical Aortic click  S4/ S2 normal. No murmur, rub, or gallop. 2+ radial and pedal pulses, symmetric  Carotid  Femoral   Abdomen and Gastrointestinal:   Soft, non-tender, bowel sounds active. Liver and Spleen  Masses   Musculoskeletal: No muscle wasting  Back  Gait   Extremities: Extremities normal, atraumatic. No cyanosis or edema. No cyanosis clubbing       Skin: Inspection and palpation performed, no rashes or lesions. Pysch: Normal mood and affect.  Alert and oriented to time place person   Neurologic: Normal gross motor and sensory exam.       Labs     All labs have been reviewed    Lab Results   Component Value Date/Time    WBC 6.5 01/10/2023 10:22 AM    RBC 4.84 01/10/2023 10:22 AM    HGB 14.6 01/10/2023 10:22 AM    HCT 45.1 01/10/2023 10:22 AM    MCV 93.1 01/10/2023 10:22 AM    RDW 15.6 01/10/2023 10:22 AM     01/10/2023 10:22 AM     Lab Results   Component Value Date/Time     01/10/2023 10:22 AM    K 4.6 01/10/2023 10:22 AM    K 4.1 10/20/2019 05:28 AM     01/10/2023 10:22 AM    CO2 23 01/10/2023 10:22 AM    BUN 17 01/10/2023 10:22 AM    CREATININE 0.8 01/10/2023 10:22 AM    GFRAA >60 2022 09:38 AM    AGRATIO 2.1 01/10/2023 10:22 AM    LABGLOM >60 01/10/2023 10:22 AM    GLUCOSE 74 01/10/2023 10:22 AM    PROT 6.1 01/10/2023 10:22 AM    CALCIUM 9.7 01/10/2023 10:22 AM    BILITOT 0.7 01/10/2023 10:22 AM    ALKPHOS 59 01/10/2023 10:22 AM    AST 25 01/10/2023 10:22 AM    ALT 7 01/10/2023 10:22 AM     No results found for: PTINR  No results found for: LABA1C  No results found for: CKTOTAL, CKMB, CKMBINDEX, TROPONINI    Cardiac, Vascular and Imaging Data all Personally Reviewed in Detail by Myself      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    Moderate concentric left ventricular hypertrophy. Abnormal (paradoxical) septal motion consistent with postoperative state. The base of the inferior wall is hypokinetic   Left ventricular ejection fraction is in the normal range. Mild left atrial dilatation. Mechanical prosthetic aortic valve which is well seated   Mildly dilated proximal ascending aorta     ECHO 2020  Concentric LVH with normal LV size and wall motion. EF is   55%. Grade I  diastolic dysfunction with normal LV filling pressures. The left atrium is mildly dilated. A mechanical artificial aortic valve appears well seated with a mean  gradient of 17 mmHg. The right ventricle is normal in size and function.   A bubble study was performed and shows evidence of right to left shunting    ECHO 2021  Left ventricular cavity size is normal.  There is asymmetric hypertrophy of the basal septum. Ejection fraction is visually estimated to be 55-60%. No regional wall motion abnormalities are noted. Indeterminate diastolic function. Normal right ventricular size and function. Right ventricular systolic function is normal.    ECHO 7/14/2022  Left ventricular cavity size is normal.  Normal left ventricular wall thickness. Ejection fraction is visually estimated to be 40-45%. There is mild diffuse hypokinesis. Grade II diastolic dysfunction with elevated LV filling pressures. Mitral valve area is calculated at 1.1cm2 consistent with moderate mitral  stenosis. The peak mitral valve velocity is 1.4m/s and the mean pressure gradient is  3mmHg. Trivial mitral regurgitation. A mechanical artificial aortic valve appears well seated with a maximum  velocity of 2.5m/s and a mean gradient of 14mmHg, max gradient of 25mmHg. Trivial cruz-device leakage noted. Mildly dilated right ventricle. Right ventricular systolic function is mildly reduced. Stress Test:   Stress test 5/3/2022  Abnormal study. There is a large sized, moderate to severe intensity,    reversible defect of the mid inferior, all apical segments, and the apical    cap which is consistent with ischemia. Study not gated (No EF calculation) secondary to frequent PVCs. Overall findings represent a high risk study. Cath:   Cardiac cath 5/9/2022  ANGIOGRAPHIC FINDINGS:  1. Left main 30% stenosis. 2.  Left anterior descending artery mid and distal 80 to 90% heavily  calcified stenosis present. 3.  Left circumflex, obtuse marginal 1 proximal 90% stenosis with heavy  calcification. 4.  Right coronary artery completely occluded. SUMMARY:  Significant LAD and circumflex disease with heavy calcification.     Other imaging:     Assessment and Plan     Coronary artery disease  Significant LAD and circumflex disease with heavy calcification. Asymptomatic. Will continue medical management with Asa, B-blocker, Imdur, statin, and warfarin. . I have advised him to call the office with any increased symptoms. Cardiomyopathy  LVEF 40-45%. Appears compensated on exam. Continue Asa and B-blocker. Mechanical AVR 1991 in [address] [place] [address] on Coumadin (PCP managed). Dyspnea with exertion. Repeat echocardiogram yearly. Hypertension  Continue current medical management. Follow up in 9 months for CAD and cardiomyopathy. Thank you for allowing us to participate in the care of Galindo Ancelmo. Please do not hesitate to contact me if you have any questions.     Nicole Guzman MD, MPH    [facility], [address] [place] [address]  Ph: [phone]  Fax: [phone]      This note was scribed in the presence of Dr Janie Guzman, by Yue Walden RN

## 2023-01-26 ENCOUNTER — OFFICE VISIT (OUTPATIENT)
Dept: CARDIOLOGY CLINIC | Age: 88
End: 2023-01-26
Payer: MEDICARE

## 2023-01-26 VITALS
DIASTOLIC BLOOD PRESSURE: 70 MMHG | SYSTOLIC BLOOD PRESSURE: 120 MMHG | OXYGEN SATURATION: 98 % | WEIGHT: 162 LBS | BODY MASS INDEX: 23.24 KG/M2 | HEART RATE: 62 BPM

## 2023-01-26 DIAGNOSIS — I25.10 CORONARY ARTERY DISEASE INVOLVING NATIVE CORONARY ARTERY OF NATIVE HEART WITHOUT ANGINA PECTORIS: Primary | ICD-10-CM

## 2023-01-26 DIAGNOSIS — I10 ESSENTIAL HYPERTENSION: ICD-10-CM

## 2023-01-26 DIAGNOSIS — I25.5 ISCHEMIC CARDIOMYOPATHY: ICD-10-CM

## 2023-01-26 DIAGNOSIS — Z95.2 HX OF ARTIFICIAL HEART VALVE REPLACEMENT: ICD-10-CM

## 2023-01-26 PROCEDURE — 99214 OFFICE O/P EST MOD 30 MIN: CPT | Performed by: INTERNAL MEDICINE

## 2023-01-26 PROCEDURE — 1123F ACP DISCUSS/DSCN MKR DOCD: CPT | Performed by: INTERNAL MEDICINE

## 2023-01-30 ENCOUNTER — TELEMEDICINE (OUTPATIENT)
Dept: FAMILY MEDICINE CLINIC | Age: 88
End: 2023-01-30
Payer: MEDICARE

## 2023-01-30 DIAGNOSIS — Z00.00 MEDICARE ANNUAL WELLNESS VISIT, SUBSEQUENT: Primary | ICD-10-CM

## 2023-01-30 PROCEDURE — 1123F ACP DISCUSS/DSCN MKR DOCD: CPT | Performed by: FAMILY MEDICINE

## 2023-01-30 PROCEDURE — G0439 PPPS, SUBSEQ VISIT: HCPCS | Performed by: FAMILY MEDICINE

## 2023-01-30 ASSESSMENT — PATIENT HEALTH QUESTIONNAIRE - PHQ9
SUM OF ALL RESPONSES TO PHQ QUESTIONS 1-9: 0
SUM OF ALL RESPONSES TO PHQ9 QUESTIONS 1 & 2: 0
SUM OF ALL RESPONSES TO PHQ QUESTIONS 1-9: 0
1. LITTLE INTEREST OR PLEASURE IN DOING THINGS: 0
2. FEELING DOWN, DEPRESSED OR HOPELESS: 0
SUM OF ALL RESPONSES TO PHQ QUESTIONS 1-9: 0
SUM OF ALL RESPONSES TO PHQ QUESTIONS 1-9: 0

## 2023-01-30 ASSESSMENT — LIFESTYLE VARIABLES
HOW MANY STANDARD DRINKS CONTAINING ALCOHOL DO YOU HAVE ON A TYPICAL DAY: PATIENT DOES NOT DRINK
HOW OFTEN DO YOU HAVE A DRINK CONTAINING ALCOHOL: NEVER

## 2023-01-30 NOTE — PATIENT INSTRUCTIONS
Learning About Being Active as an Older Adult  Why is being active important as you get older? Being active is one of the best things you can do for your health. And it's never too late to start. Being active--or getting active, if you aren't already--has definite benefits. It can:  Give you more energy,  Keep your mind sharp. Improve balance to reduce your risk of falls. Help you manage chronic illness with fewer medicines. No matter how old you are, how fit you are, or what health problems you have, there is a form of activity that will work for you. And the more physical activity you can do, the better your overall health will be. What kinds of activity can help you stay healthy? Being more active will make your daily activities easier. Physical activity includes planned exercise and things you do in daily life. There are four types of activity:  Aerobic. Doing aerobic activity makes your heart and lungs strong. Includes walking, dancing, and gardening. Aim for at least 2½ hours spread throughout the week. It improves your energy and can help you sleep better. Muscle-strengthening. This type of activity can help maintain muscle and strengthen bones. Includes climbing stairs, using resistance bands, and lifting or carrying heavy loads. Aim for at least twice a week. It can help protect the knees and other joints. Stretching. Stretching gives you better range of motion in joints and muscles. Includes upper arm stretches, calf stretches, and gentle yoga. Aim for at least twice a week, preferably after your muscles are warmed up from other activities. It can help you function better in daily life. Balancing. This helps you stay coordinated and have good posture. Includes heel-to-toe walking, ceasar chi, and certain types of yoga. Aim for at least 3 days a week. It can reduce your risk of falling.   Even if you have a hard time meeting the recommendations, it's better to be more active than less active. All activity done in each category counts toward your weekly total. You'd be surprised how daily things like carrying groceries, keeping up with grandchildren, and taking the stairs can add up.  What keeps you from being active?  If you've had a hard time being more active, you're not alone. Maybe you remember being able to do more. Or maybe you've never thought of yourself as being active. It's frustrating when you can't do the things you want. Being more active can help. What's holding you back?  Getting started.  Have a goal, but break it into easy tasks. Small steps build into big accomplishments.  Staying motivated.  If you feel like skipping your activity, remember your goal. Maybe you want to move better and stay independent. Every activity gets you one step closer.  Not feeling your best.  Start with 5 minutes of an activity you enjoy. Prove to yourself you can do it. As you get comfortable, increase your time.  You may not be where you want to be. But you're in the process of getting there. Everyone starts somewhere.  How can you find safe ways to stay active?  Talk with your doctor about any physical challenges you're facing. Make a plan with your doctor if you have a health problem or aren't sure how to get started with activity.  If you're already active, ask your doctor if there is anything you should change to stay safe as your body and health change.  If you tend to feel dizzy after you take medicine, avoid activity at that time. Try being active before you take your medicine. This will reduce your risk of falls.  If you plan to be active at home, make sure to clear your space before you get started. Remove things like TV cords, coffee tables, and throw rugs. It's safest to have plenty of space to move freely.  The key to getting more active is to take it slow and steady. Try to improve only a little bit at a time. Pick just one area to improve on at first. And if an activity hurts,  stop and talk to your doctor. Where can you learn more? Go to http://www.wells.com/ and enter P600 to learn more about \"Learning About Being Active as an Older Adult. \"  Current as of: October 10, 2022               Content Version: 13.5  © 0156-0993 Healthwise, Incorporated. Care instructions adapted under license by [facility] ([facility]). If you have questions about a medical condition or this instruction, always ask your healthcare professional. [place] [address] any warranty or liability for your use of this information. Advance Directives: Care Instructions  Overview  An advance directive is a legal way to state your wishes at the end of your life. It tells your family and your doctor what to do if you can't say what you want. There are two main types of advance directives. You can change them any time your wishes change. Living will. This form tells your family and your doctor your wishes about life support and other treatment. The form is also called a declaration. Medical power of . This form lets you name a person to make treatment decisions for you when you can't speak for yourself. This person is called a health care agent (health care proxy, health care surrogate). The form is also called a durable power of  for health care. If you do not have an advance directive, decisions about your medical care may be made by a family member, or by a doctor or a  who doesn't know you. It may help to think of an advance directive as a gift to the people who care for you. If you have one, they won't have to make tough decisions by themselves. For more information, including forms for your state, see the [address] website (www.caringinfo.org/planning/advance-directives/). Follow-up care is a key part of your treatment and safety. Be sure to make and go to all appointments, and call your doctor if you are having problems.  It's also a good idea to know your test results and keep a list of the medicines you take. What should you include in an advance directive? Many states have a unique advance directive form. (It may ask you to address specific issues.) Or you might use a universal form that's approved by many states. If your form doesn't tell you what to address, it may be hard to know what to include in your advance directive. Use the questions below to help you get started. Who do you want to make decisions about your medical care if you are not able to? What life-support measures do you want if you have a serious illness that gets worse over time or can't be cured? What are you most afraid of that might happen? (Maybe you're afraid of having pain, losing your independence, or being kept alive by machines.)  Where would you prefer to die? (Your home? A hospital? A nursing home?)  Do you want to donate your organs when you die? Do you want certain Spiritism practices performed before you die? When should you call for help? Be sure to contact your doctor if you have any questions. Where can you learn more? Go to http://[employer].wells.com/ and enter R264 to learn more about \"Advance Directives: Care Instructions. \"  Current as of: June 16, 2022               Content Version: 13.5  © 3113-6421 Healthwise, Incorporated. Care instructions adapted under license by [facility] ([facility]). If you have questions about a medical condition or this instruction, always ask your healthcare professional. [place] [address] any warranty or liability for your use of this information. Personalized Preventive Plan for [place] - 1/30/2023  Medicare offers a range of preventive health benefits. Some of the tests and screenings are paid in full while other may be subject to a deductible, co-insurance, and/or copay.     Some of these benefits include a comprehensive review of your medical history including lifestyle, illnesses that may run in your family, and various assessments and screenings as appropriate. After reviewing your medical record and screening and assessments performed today your provider may have ordered immunizations, labs, imaging, and/or referrals for you. A list of these orders (if applicable) as well as your Preventive Care list are included within your After Visit Summary for your review. Other Preventive Recommendations:    A preventive eye exam performed by an eye specialist is recommended every 1-2 years to screen for glaucoma; cataracts, macular degeneration, and other eye disorders. A preventive dental visit is recommended every 6 months. Try to get at least 150 minutes of exercise per week or 10,000 steps per day on a pedometer . Order or download the FREE \"Exercise & Physical Activity: Your Everyday Guide\" from [employer]. Call 8-805.117.6728 or search [employer] online. You need 9446-6633 mg of calcium and 9021-5486 IU of vitamin D per day. It is possible to meet your calcium requirement with diet alone, but a vitamin D supplement is usually necessary to meet this goal.  When exposed to the sun, use a sunscreen that protects against both UVA and UVB radiation with an SPF of 30 or greater. Reapply every 2 to 3 hours or after sweating, drying off with a towel, or swimming. Always wear a seat belt when traveling in a car. Always wear a helmet when riding a bicycle or motorcycle. Learning About Being Active as an Older Adult  Why is being active important as you get older? Being active is one of the best things you can do for your health. And it's never too late to start. Being active--or getting active, if you aren't already--has definite benefits. It can:  Give you more energy,  Keep your mind sharp. Improve balance to reduce your risk of falls. Help you manage chronic illness with fewer medicines.   No matter how old you are, how fit you are, or what health problems you have, there is a form of activity that will work for you. And the more physical activity you can do, the better your overall health will be. What kinds of activity can help you stay healthy? Being more active will make your daily activities easier. Physical activity includes planned exercise and things you do in daily life. There are four types of activity:  Aerobic. Doing aerobic activity makes your heart and lungs strong. Includes walking, dancing, and gardening. Aim for at least 2½ hours spread throughout the week. It improves your energy and can help you sleep better. Muscle-strengthening. This type of activity can help maintain muscle and strengthen bones. Includes climbing stairs, using resistance bands, and lifting or carrying heavy loads. Aim for at least twice a week. It can help protect the knees and other joints. Stretching. Stretching gives you better range of motion in joints and muscles. Includes upper arm stretches, calf stretches, and gentle yoga. Aim for at least twice a week, preferably after your muscles are warmed up from other activities. It can help you function better in daily life. Balancing. This helps you stay coordinated and have good posture. Includes heel-to-toe walking, ceasar chi, and certain types of yoga. Aim for at least 3 days a week. It can reduce your risk of falling. Even if you have a hard time meeting the recommendations, it's better to be more active than less active. All activity done in each category counts toward your weekly total. You'd be surprised how daily things like carrying groceries, keeping up with grandchildren, and taking the stairs can add up. What keeps you from being active? If you've had a hard time being more active, you're not alone. Maybe you remember being able to do more. Or maybe you've never thought of yourself as being active. It's frustrating when you can't do the things you want. Being more active can help.  What's holding you back? Getting started. Have a goal, but break it into easy tasks. Small steps build into big accomplishments. Staying motivated. If you feel like skipping your activity, remember your goal. Maybe you want to move better and stay independent. Every activity gets you one step closer. Not feeling your best.  Start with 5 minutes of an activity you enjoy. Prove to yourself you can do it. As you get comfortable, increase your time. You may not be where you want to be. But you're in the process of getting there. Everyone starts somewhere. How can you find safe ways to stay active? Talk with your doctor about any physical challenges you're facing. Make a plan with your doctor if you have a health problem or aren't sure how to get started with activity. If you're already active, ask your doctor if there is anything you should change to stay safe as your body and health change. If you tend to feel dizzy after you take medicine, avoid activity at that time. Try being active before you take your medicine. This will reduce your risk of falls. If you plan to be active at home, make sure to clear your space before you get started. Remove things like TV cords, coffee tables, and throw rugs. It's safest to have plenty of space to move freely. The key to getting more active is to take it slow and steady. Try to improve only a little bit at a time. Pick just one area to improve on at first. And if an activity hurts, stop and talk to your doctor. Where can you learn more? Go to http://www.wells.com/ and enter P600 to learn more about \"Learning About Being Active as an Older Adult. \"  Current as of: October 10, 2022               Content Version: 13.5  © 5498-7002 Healthwise, Incorporated. Care instructions adapted under license by [facility] ([facility]).  If you have questions about a medical condition or this instruction, always ask your healthcare professional. Kwame Santos disclaims any warranty or liability for your use of this information. Advance Directives: Care Instructions  Overview  An advance directive is a legal way to state your wishes at the end of your life. It tells your family and your doctor what to do if you can't say what you want. There are two main types of advance directives. You can change them any time your wishes change. Living will. This form tells your family and your doctor your wishes about life support and other treatment. The form is also called a declaration. Medical power of . This form lets you name a person to make treatment decisions for you when you can't speak for yourself. This person is called a health care agent (health care proxy, health care surrogate). The form is also called a durable power of  for health care. If you do not have an advance directive, decisions about your medical care may be made by a family member, or by a doctor or a  who doesn't know you. It may help to think of an advance directive as a gift to the people who care for you. If you have one, they won't have to make tough decisions by themselves. For more information, including forms for your state, see the [address] website (www.caringinfo.org/planning/advance-directives/). Follow-up care is a key part of your treatment and safety. Be sure to make and go to all appointments, and call your doctor if you are having problems. It's also a good idea to know your test results and keep a list of the medicines you take. What should you include in an advance directive? Many states have a unique advance directive form. (It may ask you to address specific issues.) Or you might use a universal form that's approved by many states. If your form doesn't tell you what to address, it may be hard to know what to include in your advance directive. Use the questions below to help you get started.   Who do you want to make decisions about your medical care if you are not able to? What life-support measures do you want if you have a serious illness that gets worse over time or can't be cured? What are you most afraid of that might happen? (Maybe you're afraid of having pain, losing your independence, or being kept alive by machines.)  Where would you prefer to die? (Your home? A hospital? A nursing home?)  Do you want to donate your organs when you die? Do you want certain Scientology practices performed before you die? When should you call for help? Be sure to contact your doctor if you have any questions. Where can you learn more? Go to http://www.wells.com/ and enter R264 to learn more about \"Advance Directives: Care Instructions. \"  Current as of: June 16, 2022               Content Version: 13.5  © 3717-8869 Healthwise, Incorporated. Care instructions adapted under license by [facility] ([facility]). If you have questions about a medical condition or this instruction, always ask your healthcare professional. [place] [address] any warranty or liability for your use of this information. Personalized Preventive Plan for [place] - 1/30/2023  Medicare offers a range of preventive health benefits. Some of the tests and screenings are paid in full while other may be subject to a deductible, co-insurance, and/or copay. Some of these benefits include a comprehensive review of your medical history including lifestyle, illnesses that may run in your family, and various assessments and screenings as appropriate. After reviewing your medical record and screening and assessments performed today your provider may have ordered immunizations, labs, imaging, and/or referrals for you. A list of these orders (if applicable) as well as your Preventive Care list are included within your After Visit Summary for your review.     Other Preventive Recommendations:    A preventive eye exam performed by an eye specialist is recommended every 1-2 years to screen for glaucoma; cataracts, macular degeneration, and other eye disorders. A preventive dental visit is recommended every 6 months. Try to get at least 150 minutes of exercise per week or 10,000 steps per day on a pedometer . Order or download the FREE \"Exercise & Physical Activity: Your Everyday Guide\" from [employer]. Call 1-647.590.2786 or search [employer] online. You need 1870-5821 mg of calcium and 9249-6452 IU of vitamin D per day. It is possible to meet your calcium requirement with diet alone, but a vitamin D supplement is usually necessary to meet this goal.  When exposed to the sun, use a sunscreen that protects against both UVA and UVB radiation with an SPF of 30 or greater. Reapply every 2 to 3 hours or after sweating, drying off with a towel, or swimming. Always wear a seat belt when traveling in a car. Always wear a helmet when riding a bicycle or motorcycle.

## 2023-01-30 NOTE — PROGRESS NOTES
Medicare Annual Wellness Visit    Cally Price is here for [facility] AWV    Assessment & Plan    Recommendations for Preventive Services Due: see orders and patient instructions/AVS.  Recommended screening schedule for the next 5-10 years is provided to the patient in written form: see Patient Instructions/AVS.     Return for Medicare Annual Wellness Visit in 1 year. Subjective   The following acute and/or chronic problems were also addressed today:  Follow up with Cardiology    Patient's complete Health Risk Assessment and screening values have been reviewed and are found in Flowsheets. The following problems were reviewed today and where indicated follow up appointments were made and/or referrals ordered. Positive Risk Factor Screenings with Interventions:                 Weight and Activity:  Physical Activity: Inactive    Days of Exercise per Week: 0 days    Minutes of Exercise per Session: 0 min     On average, how many days per week do you engage in moderate to strenuous exercise (like a brisk walk)?: 0 days  Have you lost any weight without trying in the past 3 months?: No       Inactivity Interventions:  Patient comments: will play golf more                           Objective      Patient-Reported Vitals  No data recorded   No PE       Allergies   Allergen Reactions    Amlodipine      Caused Dizziness  Caused Dizziness      Codeine      derivatives     Prior to Visit Medications    Medication Sig Taking?  Authorizing Provider   isosorbide mononitrate (IMDUR) 30 MG extended release tablet Take 0.5 tablets by mouth daily Yes DYLLAN Dukes Junior, CNP   metoprolol succinate (TOPROL XL) 25 MG extended release tablet Take 0.5 tablets by mouth daily Yes DYLLAN Dukes Junior, CNP   warfarin (COUMADIN) 10 MG tablet TAKE 1 TABLET DAILY EXCEPT 5 MG EVERY MONDAY AND FRIDAY OR AS DIRECTED BY [facility] [phone] Yes Tereso Chavez,    latanoprost (XALATAN) 0.005 % ophthalmic solution  Yes Historical Provider, MD   atorvastatin (LIPITOR) 40 MG tablet Take 1 tablet by mouth daily Yes Aisha Irizarry MD   carbidopa-levodopa (SINEMET CR)  MG per extended release tablet Take 2 tablets by mouth nightly Yes Historical Provider, MD   carbidopa-levodopa (SINEMET)  MG per tablet Take 2 tablets by mouth 4 times daily  Patient taking differently: Take 2.5 tablets by mouth 4 times daily Yes Miguel Sarabia DO   aspirin 81 MG tablet Take 81 mg by mouth daily Yes Historical Provider, MD aGrcia (Including outside providers/suppliers regularly involved in providing care):   Patient Care Team:  Miguel Sarabia DO as PCP - General (Family Medicine)  Miguel Sarabia DO as PCP - [facility] Empaneled Provider  María Elena Summers MD (Cardiology)     Reviewed and updated this visit:  Tobacco  Allergies  Meds  Problems  Med Hx  Surg Hx  Soc Hx  Fam Hx               Layla Menon, was evaluated through a synchronous (real-time) audio-video encounter. The patient (or guardian if applicable) is aware that this is a billable service, which includes applicable co-pays. This Virtual Visit was conducted with patient's (and/or legal guardian's) consent. The visit was conducted pursuant to the emergency declaration under the [facility], [address] [place] [address] authority and the [employer] and [employer] General Act. Patient identification was verified, and a caregiver was present when appropriate. The patient was located at Home: [address]  [place] [address]. Provider was located at Home ([facility]): [place].    Medicare Annual Wellness Visit    Layla Menon is here for Diandra RIVERA    Assessment & Plan   Medicare annual wellness visit, subsequent      Recommendations for Preventive Services Due: see orders and patient instructions/AVS.  Recommended screening schedule for the next 5-10 years is provided to the patient in written form: see Patient Instructions/AVS.     Return in 3 months (on 4/30/2023) for Medicare Annual Wellness Visit in 1 year. Subjective   The following acute and/or chronic problems were also addressed today:  HTN issues    Patient's complete Health Risk Assessment and screening values have been reviewed and are found in Flowsheets. The following problems were reviewed today and where indicated follow up appointments were made and/or referrals ordered. Positive Risk Factor Screenings with Interventions:                 Weight and Activity:  Physical Activity: Inactive    Days of Exercise per Week: 0 days    Minutes of Exercise per Session: 0 min     On average, how many days per week do you engage in moderate to strenuous exercise (like a brisk walk)?: 0 days  Have you lost any weight without trying in the past 3 months?: No         Inactivity Interventions:  Patient comments: patient will play more golf, walk                           Objective      Patient-Reported Vitals  No data recorded     No PE       Allergies   Allergen Reactions    Amlodipine      Caused Dizziness  Caused Dizziness      Codeine      derivatives     Prior to Visit Medications    Medication Sig Taking?  Authorizing Provider   isosorbide mononitrate (IMDUR) 30 MG extended release tablet Take 0.5 tablets by mouth daily Yes DYLLAN Heller CNP   metoprolol succinate (TOPROL XL) 25 MG extended release tablet Take 0.5 tablets by mouth daily Yes DYLLAN Heller CNP   warfarin (COUMADIN) 10 MG tablet TAKE 1 TABLET DAILY EXCEPT 5 MG EVERY MONDAY AND FRIDAY OR AS DIRECTED BY [facility] [phone] Yes Tereso Chavez,    latanoprost (XALATAN) 0.005 % ophthalmic solution  Yes Historical Provider, MD   atorvastatin (LIPITOR) 40 MG tablet Take 1 tablet by mouth daily Yes Randi Magdaleno MD   carbidopa-levodopa (SINEMET CR)  MG per extended release tablet Take 2 tablets by mouth nightly Yes Historical Provider, MD carbidopa-levodopa (SINEMET)  MG per tablet Take 2 tablets by mouth 4 times daily  Patient taking differently: Take 2.5 tablets by mouth 4 times daily Yes Isidoro Perkins DO   aspirin 81 MG tablet Take 81 mg by mouth daily Yes Historical Provider, MD Garcia (Including outside providers/suppliers regularly involved in providing care):   Patient Care Team:  Isidoro Perkins DO as PCP - General (Family Medicine)  Isidoro Perkins DO as PCP - [facility]  Arlen Menon MD (Cardiology)     Reviewed and updated this visit:  Tobacco  Allergies  Meds  Problems  Med Hx  Surg Hx  Soc Hx  Fam Hx               Daylene Gambles, was evaluated through a synchronous (real-time) audio-video encounter. The patient (or guardian if applicable) is aware that this is a billable service, which includes applicable co-pays. This Virtual Visit was conducted with patient's (and/or legal guardian's) consent. The visit was conducted pursuant to the emergency declaration under the [facility], [phone] waiver authority and the [employer] and [facility] General Act. Patient identification was verified, and a caregiver was present when appropriate. The patient was located at Home: [address] [place] [facility] [address]. Provider was located at Home ([facility] [address]): [place] [facility].

## 2023-02-02 ENCOUNTER — TELEPHONE (OUTPATIENT)
Dept: CARDIOLOGY CLINIC | Age: 88
End: 2023-02-02

## 2023-02-02 NOTE — TELEPHONE ENCOUNTER
Patient calling stating that he does not want to take half dose of his metoprolol or Imdur. He states that he got new information about the pressures in eyes and he does not need to cut the medications in half. [phone] [facility]) Dr Rodríguez Carlos [phone] ([place] [address])    Called the office and left a message and asked for an opinion from [facility]. She will get the message to the MD and call back with recommendations.

## 2023-02-02 NOTE — TELEPHONE ENCOUNTER
Ortiz Nelson called in this afternoon, he would like to speak with Bobo Mijares concerning his medication. I asked him did he just need refills, he stated no, he's just having problems with them and only wanted to speak with Bobo Mijares. He can be reached at [phone].

## 2023-02-07 RX ORDER — ISOSORBIDE MONONITRATE 30 MG/1
15 TABLET, EXTENDED RELEASE ORAL DAILY
Qty: 45 TABLET | Refills: 3 | Status: SHIPPED | OUTPATIENT
Start: 2023-02-07

## 2023-02-07 NOTE — TELEPHONE ENCOUNTER
Called and spoke with [facility], Dr. 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 Instructed patient is only taking Imdur 15 mg daily and Toprol 12.5 mg daily currently. She states she will further review with Dr. Martin Parkinson and return call. Called John Santiago and updated, instructed we would return call once we here from their office.

## 2023-02-07 NOTE — TELEPHONE ENCOUNTER
Jose Garcia called to speak with Lizbet Dawkins RN. I informed Jose Garcia that Lizbet Dawkins RN is still waiting to hear back from Dr. Jonna Ma office and once she does she will give a return call to discuss his medication. Leti Garcia can be reached at: [phone]

## 2023-03-06 ENCOUNTER — ANTI-COAG VISIT (OUTPATIENT)
Dept: PHARMACY | Age: 88
End: 2023-03-06
Payer: MEDICARE

## 2023-03-06 DIAGNOSIS — Z79.01 ANTICOAGULATED ON COUMADIN: Primary | ICD-10-CM

## 2023-03-06 DIAGNOSIS — Z95.2 HX OF ARTIFICIAL HEART VALVE REPLACEMENT: ICD-10-CM

## 2023-03-06 LAB — INR BLD: 4.2

## 2023-03-06 PROCEDURE — 85610 PROTHROMBIN TIME: CPT

## 2023-03-06 PROCEDURE — 99211 OFF/OP EST MAY X REQ PHY/QHP: CPT

## 2023-03-06 NOTE — PROGRESS NOTES
Gail Stokes is a 80 y.o. here for warfarin management. Mar Maldonado had an INR test today. Results were reviewed and appropriate warfarin management was completed. This visit was performed as: An in person visit. Protocols were followed with precautions to reduce the spread of COVID-19. Patient verifies current warfarin dosing regimen: Yes     Warfarin medication reviewed and updated on the patient 's home medication list: Yes   All other medications reviewed and updated on the patient 's home medication list: No: no medication changes     Lab Results   Component Value Date    INR 4.20 2023    INR 2.70 2023    INR 2.40 2022       Patient Findings       Negatives:  Signs/symptoms of bleeding, Change in health, Missed doses, Change in medications, Change in diet/appetite, Bruising            Anticoagulation Summary  As of 3/6/2023      INR goal:  2.5-3.5   TTR:  61.1 % (2.2 y)   INR used for dosin.20 (3/6/2023)   Warfarin maintenance plan:  5 mg (10 mg x 0.5) every Mon, Fri; 10 mg (10 mg x 1) all other days; Starting 3/6/2023   Weekly warfarin total:  60 mg   Plan last modified:  [facility] (2021)   Next INR check:  3/27/2023   Priority:  Maintenance   Target end date: Indefinite    Indications    Anticoagulated on Coumadin [Z79.01]  Hx of artificial heart valve replacement [Z95.2]                 Anticoagulation Episode Summary       INR check location:      Preferred lab:      Send INR reminders to:  [facility]    Comments:  EPIC  Consent: 23          Anticoagulation Care Providers       Provider Role Specialty Phone number    Antoniomakaylalawrence DO Malka Referring Family Medicine [phone]            There were no vitals taken for this visit. Warfarin assessment / plan:     Appears well  Supra-therapeutic INR. Denies signs and symptoms of bleeding/bruising. Denies medication changes. Denies extra warfarin doses.   Denies increased alcohol intake. Denies change in appetite. Denies illness, fever, vomiting or diarrhea. Denies cranberry or grapefruit juice intake. Patient's INR was high for unknown reasons as patient denies any medication changes, diet, activity, or missed/extra doses. With patient's INR being supra-therapeutic today instructed patient to hold Warfarin today 3/6/23 & tomorrow 3/7/23 ONLY and then resume usual dose. Description    HOLD Warfarin today 3/6/23 & tomorrow 3/7/23 ONLY     THEN CONTINUE: Warfarin 10 mg daily (one tablet) except 5 mg (one-half tablet) on Monday and Fridays    Call [phone] with signs or symptoms of bleeding or ANY medication changes (including over-the-counter medications or herbal supplements). If significant bleeding occurs please seek immediate medical attention. Keep the number of servings of vitamin K containing foods (dark green, leafy vegetables) the same each week. Please call if this changes. Salad every other day and a green vegetable most days of the week. He does not eat dark green leafy vegetables. Limit alcohol intake. He drinks a glass of wine most evenings. Please call if this changes. Immunization History   Administered Date(s) Administered    COVID-19, PFIZER Bivalent BOOSTER, DO NOT Dilute, (age 12y+), IM, 30 mcg/0.3 mL 10/13/2022    COVID-19, PFIZER PURPLE top, DILUTE for use, (age 15 y+), 30mcg/0.3mL 01/20/2021, 02/10/2021, 10/06/2021    Influenza, FLUAD, (age 72 y+), Adjuvanted, 0.5mL 10/06/2021    Influenza, High Dose (Fluzone 65 yrs and older) 09/25/2020    Zoster Recombinant (Shingrix) 09/22/2020           Orders Placed This Encounter   Procedures    Protime-INR     This external order was created through the results console. No orders of the defined types were placed in this encounter. Reviewed AVS with patient / caregiver.     Billing Points:  Adjust dosage and/or reconcile meds (fill pill box) </= 5 medications - 2 points For Pharmacy Admin Tracking Only    Intervention Detail: Adherence Monitorin  Total # of Interventions Recommended: 1  Total # of Interventions Accepted: 1  Time Spent (min): 20     Jorge Hurst PharmD Candidate 2023 3/6/2023 9:15 AM

## 2023-03-27 ENCOUNTER — ANTI-COAG VISIT (OUTPATIENT)
Dept: PHARMACY | Age: 88
End: 2023-03-27
Payer: MEDICARE

## 2023-03-27 DIAGNOSIS — Z95.2 HX OF ARTIFICIAL HEART VALVE REPLACEMENT: ICD-10-CM

## 2023-03-27 DIAGNOSIS — Z79.01 ANTICOAGULATED ON COUMADIN: Primary | ICD-10-CM

## 2023-03-27 LAB — INR BLD: 3.1

## 2023-03-27 PROCEDURE — 99211 OFF/OP EST MAY X REQ PHY/QHP: CPT

## 2023-03-27 PROCEDURE — 85610 PROTHROMBIN TIME: CPT

## 2023-03-27 NOTE — PROGRESS NOTES
Gustavo President is a 80 y.o. here for warfarin management. Matty Varela had an INR test today. Results were reviewed and appropriate warfarin management was completed. This visit was performed as: An in person visit. Protocols were followed with precautions to reduce the spread of COVID-19. Patient verifies current warfarin dosing regimen: Yes     Warfarin medication reviewed and updated on the patient 's home medication list: Yes   All other medications reviewed and updated on the patient 's home medication list: No     Lab Results   Component Value Date    INR 3.10 03/27/2023    INR 4.20 03/06/2023    INR 2.70 01/23/2023       Patient Findings       Negatives:  Signs/symptoms of bleeding, Change in health, Change in alcohol use, Missed doses, Change in medications, Change in diet/appetite, Bruising            Anticoagulation Summary  As of 3/27/2023      INR goal:  2.5-3.5   TTR:  60.5 % (2.2 y)   INR used for dosing:  3.10 (3/27/2023)   Warfarin maintenance plan:  5 mg (10 mg x 0.5) every Mon, Fri; 10 mg (10 mg x 1) all other days; Starting 3/27/2023   Weekly warfarin total:  60 mg   Plan last modified:  [facility] (4/16/2021)   Next INR check:  5/8/2023   Priority:  Maintenance   Target end date: Indefinite    Indications    Anticoagulated on Coumadin [Z79.01]  Hx of artificial heart valve replacement [Z95.2]                 Anticoagulation Episode Summary       INR check location:      Preferred lab:      Send INR reminders to:  [facility]    Comments:  EPIC  Consent: 1/23/23          Anticoagulation Care Providers       Provider Role Specialty Phone number    Skjenny Celine  Referring Family Medicine [phone]            There were no vitals taken for this visit. Warfarin assessment / plan:     Appears well. No changes affecting warfarin therapy were noted. No acute findings. INR within goal range. No change to warfarin dosing today.      Patient's INR

## 2023-03-30 RX ORDER — METOPROLOL SUCCINATE 25 MG/1
TABLET, EXTENDED RELEASE ORAL
Qty: 90 TABLET | Refills: 3 | Status: SHIPPED | OUTPATIENT
Start: 2023-03-30

## 2023-03-30 RX ORDER — ATORVASTATIN CALCIUM 40 MG/1
TABLET, FILM COATED ORAL
Qty: 90 TABLET | Refills: 3 | Status: SHIPPED | OUTPATIENT
Start: 2023-03-30

## 2023-04-12 PROBLEM — I20.9 ANGINA PECTORIS, UNSPECIFIED (HCC): Status: ACTIVE | Noted: 2023-04-12

## 2023-04-12 PROBLEM — I25.119 ATHEROSCLEROTIC HEART DISEASE OF NATIVE CORONARY ARTERY WITH UNSPECIFIED ANGINA PECTORIS (HCC): Status: ACTIVE | Noted: 2023-04-12

## 2023-05-08 ENCOUNTER — ANTI-COAG VISIT (OUTPATIENT)
Dept: PHARMACY | Age: 88
End: 2023-05-08
Payer: MEDICARE

## 2023-05-08 DIAGNOSIS — Z79.01 ANTICOAGULATED ON COUMADIN: Primary | ICD-10-CM

## 2023-05-08 DIAGNOSIS — Z95.2 HX OF ARTIFICIAL HEART VALVE REPLACEMENT: ICD-10-CM

## 2023-05-08 LAB — INTERNATIONAL NORMALIZATION RATIO, POC: 2.7

## 2023-05-08 PROCEDURE — 99211 OFF/OP EST MAY X REQ PHY/QHP: CPT

## 2023-05-08 PROCEDURE — 85610 PROTHROMBIN TIME: CPT

## 2023-05-08 NOTE — PROGRESS NOTES
Stella Caldera is a 80 y.o. here for warfarin management. Ana Tavarez had an INR test today. Results were reviewed and appropriate warfarin management was completed. This visit was performed as: An in person visit. Protocols were followed with precautions to reduce the spread of COVID-19. Patient verifies current warfarin dosing regimen: Yes     Warfarin medication reviewed and updated on the patient 's home medication list: Yes   All other medications reviewed and updated on the patient 's home medication list: Yes     Lab Results   Component Value Date    INR 2.7 2023    INR 3.10 2023    INR 4.20 2023       Anticoagulation Summary  As of 2023      INR goal:  2.5-3.5   TTR:  62.4 % (2.3 y)   INR used for dosin.7 (2023)   Warfarin maintenance plan:  5 mg (10 mg x 0.5) every Mon, Fri; 10 mg (10 mg x 1) all other days   Weekly warfarin total:  60 mg   Plan last modified:  Rk Dunham, [address] [place] [address] (2021)   Next INR check:  2023   Priority:  Maintenance   Target end date: Indefinite    Indications    Anticoagulated on Coumadin [Z79.01]  Hx of artificial heart valve replacement [Z95.2]                 Anticoagulation Episode Summary       INR check location:      Preferred lab:      Send INR reminders to:  [facility]    Comments:  EPIC  Consent: 23          Anticoagulation Care Providers       Provider Role Specialty Phone number    Yenifer Paige DO Referring Family Medicine [phone]          There were no vitals taken for this visit. Warfarin assessment / plan:     Patient appears well today. No changes affecting warfarin therapy were noted. No acute findings with regards to warfarin therapy. INR today is within goal range. Instructed to continue the same weekly warfarin dose.     See in 6 weeks    Description     CONTINUE: Warfarin 10 mg daily (one tablet) except 5 mg (one-half tablet) on Monday and     Call [phone]

## 2023-06-19 ENCOUNTER — ANTI-COAG VISIT (OUTPATIENT)
Dept: PHARMACY | Age: 88
End: 2023-06-19
Payer: MEDICARE

## 2023-06-19 DIAGNOSIS — Z95.2 HX OF ARTIFICIAL HEART VALVE REPLACEMENT: ICD-10-CM

## 2023-06-19 DIAGNOSIS — Z79.01 ANTICOAGULATED ON COUMADIN: Primary | ICD-10-CM

## 2023-06-19 LAB — INR BLD: 3.6

## 2023-06-19 PROCEDURE — 99211 OFF/OP EST MAY X REQ PHY/QHP: CPT

## 2023-06-19 PROCEDURE — 85610 PROTHROMBIN TIME: CPT

## 2023-06-19 NOTE — PROGRESS NOTES
I do see a recent fill/order for selegiline from [facility]    Pt did not report new meds to us, however, he may have not thought of this. Assess at next visit and add to list if  he is taking     Does not interact with warfarin.

## 2023-06-19 NOTE — PROGRESS NOTES
Enoch Duncan is a 80 y.o. here for warfarin management. Joann Rivers had an INR test today. Results were reviewed and appropriate warfarin management was completed. Patient verifies current warfarin dosing regimen: Yes     Warfarin medication reviewed and updated on the patient 's home medication list: Yes   All other medications reviewed and updated on the patient 's home medication list: No: No changes     Lab Results   Component Value Date    INR 3.60 06/19/2023    INR 2.7 05/08/2023    INR 3.10 03/27/2023     Patient Findings       Negatives:  Signs/symptoms of thrombosis, Signs/symptoms of bleeding, Missed doses, Change in medications, Change in diet/appetite, Bruising          Anticoagulation Summary  As of 6/19/2023      INR goal:  2.5-3.5   TTR:  63.6 % (2.4 y)   INR used for dosing:  3.60 (6/19/2023)   Warfarin maintenance plan:  5 mg (10 mg x 0.5) every Mon, Fri; 10 mg (10 mg x 1) all other days   Weekly warfarin total:  60 mg   Plan last modified:  [facility] (4/16/2021)   Next INR check:  7/31/2023   Priority:  Maintenance   Target end date: Indefinite    Indications    Anticoagulated on Coumadin [Z79.01]  Hx of artificial heart valve replacement [Z95.2]                 Anticoagulation Episode Summary       INR check location:      Preferred lab:      Send INR reminders to:  [facility]AFF    Comments:  EPIC  Consent: 1/23/23          Anticoagulation Care Providers       Provider Role Specialty Phone number    Alcides Marshall DO Referring Family Medicine [phone]          There were no vitals taken for this visit. Warfarin assessment / plan:     Appears well  Supra-therapeutic INR. Denies signs and symptoms of bleeding/bruising. Denies medication changes. Denies extra warfarin doses. Denies change in appetite. Denies decrease in vitamin K intake. Joann Rivers does not report any medication or diet changes that may have affected his INR today.  We will keep him

## 2023-06-27 DIAGNOSIS — Z95.2 HX OF ARTIFICIAL HEART VALVE REPLACEMENT: ICD-10-CM

## 2023-06-27 RX ORDER — WARFARIN SODIUM 10 MG/1
TABLET ORAL
Qty: 90 TABLET | Refills: 2 | Status: SHIPPED | OUTPATIENT
Start: 2023-06-27

## 2023-06-27 RX ORDER — CARBIDOPA AND LEVODOPA 50; 200 MG/1; MG/1
2 TABLET, EXTENDED RELEASE ORAL NIGHTLY
Qty: 60 TABLET | Refills: 5 | Status: SHIPPED | OUTPATIENT
Start: 2023-06-27

## 2023-07-05 ENCOUNTER — TELEPHONE (OUTPATIENT)
Dept: FAMILY MEDICINE CLINIC | Age: 88
End: 2023-07-05

## 2023-07-05 DIAGNOSIS — Z95.2 HX OF ARTIFICIAL HEART VALVE REPLACEMENT: ICD-10-CM

## 2023-07-05 RX ORDER — CARBIDOPA AND LEVODOPA 50; 200 MG/1; MG/1
2 TABLET, EXTENDED RELEASE ORAL 2 TIMES DAILY
Qty: 180 TABLET | Refills: 3 | Status: SHIPPED | OUTPATIENT
Start: 2023-07-05 | End: 2023-07-12 | Stop reason: SDUPTHER

## 2023-07-05 NOTE — TELEPHONE ENCOUNTER
Pt spoke with [employer] about his med's and was told that they have been trying to reach the office with questions about the Carbidopa Levodopa  mg for quantity 180 tablets with 3 refills. Please give [employer] a call [phone].  Please give pt a call [phone]

## 2023-07-05 NOTE — TELEPHONE ENCOUNTER
Pt called to ask Dr Matt Fonseca to resend his rx to [facility] for Carbidopa-levodopa(Sinemet)  mg for a 90 day supply.  Please give pt a call [phone]

## 2023-07-12 ENCOUNTER — HOSPITAL ENCOUNTER (OUTPATIENT)
Dept: GENERAL RADIOLOGY | Age: 88
Discharge: HOME | End: 2023-07-12
Payer: MEDICARE

## 2023-07-12 ENCOUNTER — HOSPITAL ENCOUNTER (OUTPATIENT)
Age: 88
Discharge: HOME | End: 2023-07-12
Payer: MEDICARE

## 2023-07-12 ENCOUNTER — OFFICE VISIT (OUTPATIENT)
Dept: FAMILY MEDICINE CLINIC | Age: 88
End: 2023-07-12

## 2023-07-12 VITALS
HEIGHT: 70 IN | WEIGHT: 160 LBS | SYSTOLIC BLOOD PRESSURE: 120 MMHG | BODY MASS INDEX: 22.9 KG/M2 | DIASTOLIC BLOOD PRESSURE: 78 MMHG

## 2023-07-12 DIAGNOSIS — M25.552 PAIN OF LEFT HIP: ICD-10-CM

## 2023-07-12 DIAGNOSIS — Z95.2 HX OF ARTIFICIAL HEART VALVE REPLACEMENT: ICD-10-CM

## 2023-07-12 DIAGNOSIS — M25.552 PAIN OF LEFT HIP: Primary | ICD-10-CM

## 2023-07-12 DIAGNOSIS — G20 PARKINSON DISEASE (HCC): ICD-10-CM

## 2023-07-12 LAB
ALBUMIN SERPL-MCNC: 4.1 G/DL (ref 3.4–5)
ALBUMIN/GLOB SERPL: 1.9 {RATIO} (ref 1.1–2.2)
ALP SERPL-CCNC: 76 U/L (ref 40–129)
ALT SERPL-CCNC: 7 U/L (ref 10–40)
ANION GAP SERPL CALCULATED.3IONS-SCNC: 9 MMOL/L (ref 3–16)
AST SERPL-CCNC: 24 U/L (ref 15–37)
BILIRUB SERPL-MCNC: 0.7 MG/DL (ref 0–1)
BUN SERPL-MCNC: 22 MG/DL (ref 7–20)
CALCIUM SERPL-MCNC: 9.7 MG/DL (ref 8.3–10.6)
CHLORIDE SERPL-SCNC: 101 MMOL/L (ref 99–110)
CO2 SERPL-SCNC: 30 MMOL/L (ref 21–32)
CREAT SERPL-MCNC: 0.8 MG/DL (ref 0.8–1.3)
DEPRECATED RDW RBC AUTO: 15.2 % (ref 12.4–15.4)
GFR SERPLBLD CREATININE-BSD FMLA CKD-EPI: >60 ML/MIN/{1.73_M2}
GLUCOSE SERPL-MCNC: 82 MG/DL (ref 70–99)
HCT VFR BLD AUTO: 41.5 % (ref 40.5–52.5)
HGB BLD-MCNC: 14.1 G/DL (ref 13.5–17.5)
MCH RBC QN AUTO: 31.1 PG (ref 26–34)
MCHC RBC AUTO-ENTMCNC: 34.1 G/DL (ref 31–36)
MCV RBC AUTO: 91.3 FL (ref 80–100)
PLATELET # BLD AUTO: 267 K/UL (ref 135–450)
PMV BLD AUTO: 8.7 FL (ref 5–10.5)
POTASSIUM SERPL-SCNC: 4.8 MMOL/L (ref 3.5–5.1)
PROT SERPL-MCNC: 6.3 G/DL (ref 6.4–8.2)
RBC # BLD AUTO: 4.55 M/UL (ref 4.2–5.9)
SODIUM SERPL-SCNC: 140 MMOL/L (ref 136–145)
WBC # BLD AUTO: 6.1 K/UL (ref 4–11)

## 2023-07-12 PROCEDURE — 73502 X-RAY EXAM HIP UNI 2-3 VIEWS: CPT

## 2023-07-12 RX ORDER — SELEGILINE HYDROCHLORIDE 5 MG/1
CAPSULE ORAL
COMMUNITY
Start: 2023-06-21

## 2023-07-12 RX ORDER — CARBIDOPA AND LEVODOPA 50; 200 MG/1; MG/1
TABLET, EXTENDED RELEASE ORAL
Qty: 180 TABLET | Refills: 3 | Status: SHIPPED | OUTPATIENT
Start: 2023-07-12

## 2023-07-12 NOTE — PROGRESS NOTES
2023     Justine Boyer (:  1934) is a 80 y.o. male, here for evaluation of the following medical concerns:    HPI    Patient presented today for follow up concerning several chronic medical condistions. Overall, he feels well. 1.  Aortic valve- caumadin 10 mg  four times a week, 5 mg three times a week, last INR was wnl, he is wanting it checked today, recently saw his new  Cardiologist, feels well and doesn't have a concern today. He is being managed by the Coumadin clinic. 2.  Parkinsons disease- tremors at rest, ambulation is wnl, follows with Neurology at [facility], feels it is not well controlled on the medication, denied a side effect from the medication,       3. Bilateral leg weakness- patient is having more balance issues and strength. Denied falling, will refer for PT. 4.  Left hip pain has been on and off, walking with a limp, denied injury or trauma. Today, denied chest pain sob, n, v or diarrhea. Review of Systems   Constitutional:  Positive for fatigue. Negative for activity change, fever and unexpected weight change. HENT:  Negative for congestion, ear pain, rhinorrhea, sinus pressure, sore throat and trouble swallowing. Eyes:  Negative for visual disturbance. Respiratory:  Negative for cough, chest tightness, shortness of breath and wheezing. Cardiovascular:  Negative for chest pain, palpitations and leg swelling. Gastrointestinal:  Negative for abdominal pain, diarrhea, nausea and vomiting. Endocrine: Negative for cold intolerance, heat intolerance, polydipsia and polyphagia. Musculoskeletal:  Positive for arthralgias and gait problem. Skin:  Negative for rash. Neurological:  Positive for tremors and weakness. Negative for light-headedness and headaches. Psychiatric/Behavioral:  Negative for dysphoric mood. The patient is not nervous/anxious. Prior to Visit Medications    Medication Sig Taking?  Authorizing Provider   selegiline

## 2023-07-16 ASSESSMENT — ENCOUNTER SYMPTOMS
WHEEZING: 0
NAUSEA: 0
VOMITING: 0
SHORTNESS OF BREATH: 0
CHEST TIGHTNESS: 0
ABDOMINAL PAIN: 0
SINUS PRESSURE: 0
DIARRHEA: 0
COUGH: 0
TROUBLE SWALLOWING: 0
SORE THROAT: 0
RHINORRHEA: 0

## 2023-07-20 ENCOUNTER — NURSE ONLY (OUTPATIENT)
Dept: FAMILY MEDICINE CLINIC | Age: 88
End: 2023-07-20
Payer: MEDICARE

## 2023-07-20 DIAGNOSIS — R39.9 UTI SYMPTOMS: Primary | ICD-10-CM

## 2023-07-20 LAB
BILIRUBIN, POC: NORMAL
BLOOD URINE, POC: NORMAL
CLARITY, POC: CLEAR
COLOR, POC: YELLOW
GLUCOSE URINE, POC: NORMAL
KETONES, POC: NORMAL
LEUKOCYTE EST, POC: NORMAL
NITRITE, POC: NORMAL
PH, POC: 6.5
PROTEIN, POC: NORMAL
SPECIFIC GRAVITY, POC: 1.01
UROBILINOGEN, POC: 0.2

## 2023-07-20 PROCEDURE — 81002 URINALYSIS NONAUTO W/O SCOPE: CPT | Performed by: FAMILY MEDICINE

## 2023-07-31 ENCOUNTER — ANTI-COAG VISIT (OUTPATIENT)
Dept: PHARMACY | Age: 88
End: 2023-07-31
Payer: MEDICARE

## 2023-07-31 DIAGNOSIS — Z95.2 HX OF ARTIFICIAL HEART VALVE REPLACEMENT: ICD-10-CM

## 2023-07-31 DIAGNOSIS — Z79.01 ANTICOAGULATED ON COUMADIN: Primary | ICD-10-CM

## 2023-07-31 LAB — INTERNATIONAL NORMALIZATION RATIO, POC: 3.2

## 2023-07-31 PROCEDURE — 99211 OFF/OP EST MAY X REQ PHY/QHP: CPT

## 2023-07-31 PROCEDURE — 85610 PROTHROMBIN TIME: CPT

## 2023-07-31 NOTE — PROGRESS NOTES
Dean Davis is a 80 y.o. here for warfarin management. Liliana Acosta had an INR test today. Results were reviewed and appropriate warfarin management was completed. Patient verifies current warfarin dosing regimen: Yes     Warfarin medication reviewed and updated on the patient 's home medication list: Yes   All other medications reviewed and updated on the patient 's home medication list: Yes     Lab Results   Component Value Date    INR 3.2 07/31/2023    INR 3.60 06/19/2023    INR 2.7 05/08/2023       Anticoagulation Summary  As of 7/31/2023      INR goal:  2.5-3.5   TTR:  64.1 % (2.6 y)   INR used for dosing:  3.2 (7/31/2023)   Warfarin maintenance plan:  5 mg (10 mg x 0.5) every Mon, Fri; 10 mg (10 mg x 1) all other days   Weekly warfarin total:  60 mg   Plan last modified:  Mecca Holden, [facility] (4/16/2021)   Next INR check:  9/11/2023   Priority:  Maintenance   Target end date: Indefinite    Indications    Anticoagulated on Coumadin [Z79.01]  Hx of artificial heart valve replacement [Z95.2]                 Anticoagulation Episode Summary       INR check location:      Preferred lab:      Send INR reminders to:  [facility]AFF    Comments:  EPIC  Consent: 1/23/23          Anticoagulation Care Providers       Provider Role Specialty Phone number    Isabelle Marquez DO Referring Family Medicine [phone]          There were no vitals taken for this visit. Warfarin assessment / plan:     Patient appears well today. No changes affecting warfarin therapy were noted. No acute findings with regards to warfarin therapy. INR today is within goal range. Instructed to continue the same weekly warfarin dose. See in 6 weeks    Description     CONTINUE: Warfarin 10 mg daily (one tablet) except 5 mg (one-half tablet) on Monday and Fridays    Call [phone] with signs or symptoms of bleeding or ANY medication changes (including over-the-counter medications or herbal supplements).

## 2023-09-11 ENCOUNTER — APPOINTMENT (OUTPATIENT)
Dept: PHARMACY | Age: 88
End: 2023-09-11
Payer: MEDICARE

## 2023-09-11 ENCOUNTER — TELEPHONE (OUTPATIENT)
Dept: PHARMACY | Age: 88
End: 2023-09-11

## 2023-09-11 NOTE — TELEPHONE ENCOUNTER
Mr. Zuleyka Alfaro wife called and states that he cannot make his appointment today due to testing positive for Covid a few days ago. He is not currently taking any new medications except for a two doses of acetaminophen since Friday. He is pretty much eating a regular diet at this time. He will call when he is feeling better to reschedule his appointment.       Cathi Jensen, Jose, BCPS  9/11/2023  9:28 AM

## 2023-09-14 ENCOUNTER — OFFICE VISIT (OUTPATIENT)
Dept: FAMILY MEDICINE CLINIC | Age: 88
End: 2023-09-14
Payer: MEDICARE

## 2023-09-14 ENCOUNTER — TELEPHONE (OUTPATIENT)
Dept: FAMILY MEDICINE CLINIC | Age: 88
End: 2023-09-14

## 2023-09-14 VITALS — SYSTOLIC BLOOD PRESSURE: 116 MMHG | DIASTOLIC BLOOD PRESSURE: 64 MMHG

## 2023-09-14 DIAGNOSIS — G20.A1 PARKINSON DISEASE: ICD-10-CM
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
## 2023-09-14 DIAGNOSIS — Z95.2 HX OF ARTIFICIAL HEART VALVE REPLACEMENT: ICD-10-CM

## 2023-09-14 PROCEDURE — 99214 OFFICE O/P EST MOD 30 MIN: CPT | Performed by: FAMILY MEDICINE

## 2023-09-14 PROCEDURE — 1123F ACP DISCUSS/DSCN MKR DOCD: CPT | Performed by: FAMILY MEDICINE

## 2023-09-14 NOTE — TELEPHONE ENCOUNTER
Pt's daughter states he has to urinate frequently but doesn't feel he has an infection because it doesn't burn when urinating. This is the reason he fall because he was rushing to the bathroom. The reason he has decreased his Parkinson's medication is because it lowest his BP which he feels makes him sleepy. An appt was made to see his Parkinson's doctor - Dr. Ade Luis on 9-28     Want to know if you have any thought about his BP medication and his frequently urination. Please contact daughter Jeffory Gitelman at [phone]. Can leave a message. She is a teacher and may not be able to answer her phone.

## 2023-09-15 ENCOUNTER — TELEPHONE (OUTPATIENT)
Dept: FAMILY MEDICINE CLINIC | Age: 88
End: 2023-09-15

## 2023-09-15 NOTE — TELEPHONE ENCOUNTER
Please provide her with a sample cup and we can test his urine. His bp was stable in the office but checking his BP and letting me know how low it is getting would be important. If below 110/60 at home cut Imdur in half, perhaps at this point reducing it slightly would be a good course of action and then adjusting in the future.  Let me know what additional questions she has

## 2023-09-15 NOTE — TELEPHONE ENCOUNTER
Patient daughter was relayed the message. She wants to know if his BP goes above 110/60 would she still cut medication in half? Also is inquiring about an insulin problem? Daughter had a lot of questions, stated while they were here yesterday she didn't ask the questions they wanted. VV? Phone call?

## 2023-09-19 ENCOUNTER — ANTI-COAG VISIT (OUTPATIENT)
Dept: PHARMACY | Age: 88
End: 2023-09-19
Payer: MEDICARE

## 2023-09-19 DIAGNOSIS — Z95.2 HX OF ARTIFICIAL HEART VALVE REPLACEMENT: ICD-10-CM

## 2023-09-19 DIAGNOSIS — Z79.01 ANTICOAGULATED ON COUMADIN: Primary | ICD-10-CM

## 2023-09-19 LAB — INR BLD: 4.5

## 2023-09-19 PROCEDURE — 85610 PROTHROMBIN TIME: CPT | Performed by: SPEECH-LANGUAGE PATHOLOGIST

## 2023-09-19 PROCEDURE — 99211 OFF/OP EST MAY X REQ PHY/QHP: CPT | Performed by: SPEECH-LANGUAGE PATHOLOGIST

## 2023-09-19 NOTE — PROGRESS NOTES
Wing Cristina is a 80 y.o. here for warfarin management. Norma Franco had an INR test today. Results were reviewed and appropriate warfarin management was completed. Patient verifies current warfarin dosing regimen: Yes     Warfarin medication reviewed and updated on the patient 's home medication list: Yes   All other medications reviewed and updated on the patient 's home medication list: Yes     Lab Results   Component Value Date    INR 4.50 2023    INR 3.2 2023    INR 3.60 2023     Patient Findings       Positives:  Missed doses    Negatives:  Signs/symptoms of thrombosis, Signs/symptoms of bleeding, Change in medications, Change in diet/appetite, Bruising    Comments:  Missed a dose a week ago   Took dose this morning           Anticoagulation Summary  As of 2023      INR goal:  2.5-3.5   TTR:  62.1 % (2.7 y)   INR used for dosin.50 (2023)   Warfarin maintenance plan:  5 mg (10 mg x 0.5) every Mon, Fri; 10 mg (10 mg x 1) all other days   Weekly warfarin total:  60 mg   Plan last modified:  Marycruz Panda, [facility] (2021)   Next INR check:  10/2/2023   Priority:  Maintenance   Target end date: Indefinite    Indications    Anticoagulated on Coumadin [Z79.01]  Hx of artificial heart valve replacement [Z95.2]                 Anticoagulation Episode Summary       INR check location:      Preferred lab:      Send INR reminders to:  [facility]    Comments:  EPIC  Consent: 23          Anticoagulation Care Providers       Provider Role Specialty Phone number    Jarvis Louis DO Referring Family Medicine [phone]          There were no vitals taken for this visit. Warfarin assessment / plan:     Appears well  Supra-therapeutic INR. Denies signs and symptoms of bleeding/bruising. Denies extra warfarin doses. Denies increased alcohol intake. Denies change in appetite. This is patient's first INR check since .  States that nothing has

## 2023-09-26 ASSESSMENT — ENCOUNTER SYMPTOMS
TROUBLE SWALLOWING: 0
SHORTNESS OF BREATH: 0
SORE THROAT: 0
BACK PAIN: 1
DIARRHEA: 0
SINUS PRESSURE: 0
RHINORRHEA: 0
VOMITING: 0
NAUSEA: 0
COUGH: 0
ABDOMINAL PAIN: 0

## 2023-09-28 ENCOUNTER — TELEPHONE (OUTPATIENT)
Dept: FAMILY MEDICINE CLINIC | Age: 88
End: 2023-09-28

## 2023-09-28 NOTE — TELEPHONE ENCOUNTER
Isi Ca called from [facility] on behalf of Dr. Daniel Aldrich. She stated Dr. Luis Carrington would like to speak with Dr. Lele Anderson in regards to getting approval to decrease the pt's BP medication.  Isi Ca said to call at [phone]

## 2023-09-29 NOTE — TELEPHONE ENCOUNTER
I tried to call but they only gave me their general line. I can't stay on the line for 5 minutes. If they want to talk direct they need to give cell phone. With that said, I don't manage his cardiovascular medication.   They need to discuss with his cardiologist.

## 2023-10-02 ENCOUNTER — ANTI-COAG VISIT (OUTPATIENT)
Dept: PHARMACY | Age: 88
End: 2023-10-02
Payer: MEDICARE

## 2023-10-02 DIAGNOSIS — Z95.2 HX OF ARTIFICIAL HEART VALVE REPLACEMENT: ICD-10-CM

## 2023-10-02 DIAGNOSIS — Z79.01 ANTICOAGULATED ON COUMADIN: Primary | ICD-10-CM

## 2023-10-02 LAB — INTERNATIONAL NORMALIZATION RATIO, POC: 2

## 2023-10-02 PROCEDURE — 85610 PROTHROMBIN TIME: CPT | Performed by: SPEECH-LANGUAGE PATHOLOGIST

## 2023-10-02 PROCEDURE — 99211 OFF/OP EST MAY X REQ PHY/QHP: CPT | Performed by: SPEECH-LANGUAGE PATHOLOGIST

## 2023-10-03 NOTE — PROGRESS NOTES
Jim Méndez is a 80 y.o. here for warfarin management. Jodee Olmos had an INR test today. Results were reviewed and appropriate warfarin management was completed. Patient verifies current warfarin dosing regimen: Yes     Warfarin medication reviewed and updated on the patient 's home medication list: Yes   All other medications reviewed and updated on the patient 's home medication list: Yes     Lab Results   Component Value Date    INR 2.0 10/02/2023    INR 4.50 2023    INR 3.2 2023       Anticoagulation Summary  As of 10/2/2023      INR goal:  2.5-3.5   TTR:  61.8 % (2.7 y)   INR used for dosin.0 (10/2/2023)   Warfarin maintenance plan:  5 mg (10 mg x 0.5) every Mon, Fri; 10 mg (10 mg x 1) all other days   Weekly warfarin total:  60 mg   Plan last modified:  Mecca Holden [facility] (2021)   Next INR check:  10/16/2023   Priority:  Maintenance   Target end date: Indefinite    Indications    Anticoagulated on Coumadin [Z79.01]  Hx of artificial heart valve replacement [Z95.2]                 Anticoagulation Episode Summary       INR check location:      Preferred lab:      Send INR reminders to:  [facility]    Comments:  EPIC  Consent: 23          Anticoagulation Care Providers       Provider Role Specialty Phone number    Allison Styles DO Referring Family Medicine [phone]          There were no vitals taken for this visit. Warfarin assessment / plan:     Appears well  Sub-therapeutic INR     Denies increased vitamin K intake. Denies medication changes. Denies alcohol changes. Denies increased activity. Denies signs or symptoms of clotting. Denies signs or symptoms of a stroke  Denies changes to smoking. Patient states his wife has been in the hospital the last two weeks and he feels it is quite possible that he missed a dose or two, but he cannot be sure.   I will have the patient take warfarin 10 mg today only, then he will resume his prior

## 2023-10-12 ENCOUNTER — OFFICE VISIT (OUTPATIENT)
Dept: FAMILY MEDICINE CLINIC | Age: 88
End: 2023-10-12
Payer: MEDICARE

## 2023-10-12 VITALS
WEIGHT: 155 LBS | HEIGHT: 70 IN | BODY MASS INDEX: 22.19 KG/M2 | SYSTOLIC BLOOD PRESSURE: 102 MMHG | DIASTOLIC BLOOD PRESSURE: 68 MMHG

## 2023-10-12 DIAGNOSIS — I25.119 ATHEROSCLEROSIS OF NATIVE CORONARY ARTERY OF NATIVE HEART WITH ANGINA PECTORIS (HCC): Primary | ICD-10-CM

## 2023-10-12 DIAGNOSIS — G20.A2 PARKINSON'S DISEASE WITHOUT DYSKINESIA, WITH FLUCTUATING MANIFESTATIONS: ICD-10-CM

## 2023-10-12 DIAGNOSIS — I95.0 IDIOPATHIC HYPOTENSION: ICD-10-CM

## 2023-10-12 PROCEDURE — 1123F ACP DISCUSS/DSCN MKR DOCD: CPT | Performed by: FAMILY MEDICINE

## 2023-10-12 PROCEDURE — G0008 ADMIN INFLUENZA VIRUS VAC: HCPCS | Performed by: FAMILY MEDICINE

## 2023-10-12 PROCEDURE — 90694 VACC AIIV4 NO PRSRV 0.5ML IM: CPT | Performed by: FAMILY MEDICINE

## 2023-10-12 PROCEDURE — 99214 OFFICE O/P EST MOD 30 MIN: CPT | Performed by: FAMILY MEDICINE

## 2023-10-23 ENCOUNTER — ANTI-COAG VISIT (OUTPATIENT)
Dept: PHARMACY | Age: 88
End: 2023-10-23
Payer: MEDICARE

## 2023-10-23 DIAGNOSIS — Z95.2 HX OF ARTIFICIAL HEART VALVE REPLACEMENT: ICD-10-CM

## 2023-10-23 DIAGNOSIS — Z79.01 ANTICOAGULATED ON COUMADIN: Primary | ICD-10-CM

## 2023-10-23 PROBLEM — G20.A1 PARKINSON'S DISEASE: Status: ACTIVE | Noted: 2023-10-23

## 2023-10-23 LAB — INTERNATIONAL NORMALIZATION RATIO, POC: 3.4

## 2023-10-23 PROCEDURE — 99211 OFF/OP EST MAY X REQ PHY/QHP: CPT

## 2023-10-23 PROCEDURE — 85610 PROTHROMBIN TIME: CPT

## 2023-10-23 ASSESSMENT — ENCOUNTER SYMPTOMS
NAUSEA: 0
SHORTNESS OF BREATH: 0
DIARRHEA: 0
VOMITING: 0
ABDOMINAL PAIN: 0

## 2023-10-23 NOTE — PROGRESS NOTES
Milind Kirk is a 80 y.o. here for warfarin management. Grayson Simmonds had an INR test today. Results were reviewed and appropriate warfarin management was completed. Patient verifies current warfarin dosing regimen: Yes     Warfarin medication reviewed and updated on the patient 's home medication list: Yes   All other medications reviewed and updated on the patient 's home medication list: No: no changes     Lab Results   Component Value Date    INR 3.4 10/23/2023    INR 2.0 10/02/2023    INR 4.50 09/19/2023     Patient Findings       Negatives:  Signs/symptoms of bleeding, Change in health, Missed doses, Change in medications, Change in diet/appetite, Bruising          Anticoagulation Summary  As of 10/23/2023      INR goal:  2.5-3.5   TTR:  61.9 % (2.8 y)   INR used for dosing:     Warfarin maintenance plan:  5 mg (10 mg x 0.5) every Mon, Fri; 10 mg (10 mg x 1) all other days   Weekly warfarin total:  60 mg   Plan last modified:  Mecca Holden [facility] (4/16/2021)   Next INR check:  12/4/2023   Priority:  Maintenance   Target end date: Indefinite    Indications    Anticoagulated on Coumadin [Z79.01]  Hx of artificial heart valve replacement [Z95.2]                 Anticoagulation Episode Summary       INR check location:      Preferred lab:      Send INR reminders to:  [facility]    Comments:  EPIC  Consent: 1/23/23          Anticoagulation Care Providers       Provider Role Specialty Phone number    Ismael Nicholson DO Referring Family Medicine [phone]          There were no vitals taken for this visit. Warfarin assessment / plan:     Patient appears well today. No changes affecting warfarin therapy were noted. No acute findings with regards to warfarin therapy. INR today is within goal range. Instructed to continue the same weekly warfarin dose.         Description    CONTINUE: Warfarin 10 mg daily (one tablet) except 5 mg (one-half tablet) on Monday and

## 2023-10-24 ENCOUNTER — PROCEDURE VISIT (OUTPATIENT)
Dept: CARDIOLOGY CLINIC | Age: 88
End: 2023-10-24

## 2023-10-24 DIAGNOSIS — I25.10 CORONARY ARTERY DISEASE INVOLVING NATIVE CORONARY ARTERY OF NATIVE HEART WITHOUT ANGINA PECTORIS: ICD-10-CM

## 2023-10-24 DIAGNOSIS — I25.5 ISCHEMIC CARDIOMYOPATHY: ICD-10-CM

## 2023-10-26 ENCOUNTER — OFFICE VISIT (OUTPATIENT)
Dept: CARDIOLOGY CLINIC | Age: 88
End: 2023-10-26
Payer: MEDICARE

## 2023-10-26 VITALS
WEIGHT: 154 LBS | DIASTOLIC BLOOD PRESSURE: 62 MMHG | BODY MASS INDEX: 22.1 KG/M2 | HEART RATE: 67 BPM | SYSTOLIC BLOOD PRESSURE: 118 MMHG | OXYGEN SATURATION: 97 %

## 2023-10-26 DIAGNOSIS — I25.10 CORONARY ARTERY DISEASE INVOLVING NATIVE CORONARY ARTERY OF NATIVE HEART WITHOUT ANGINA PECTORIS: Primary | ICD-10-CM

## 2023-10-26 DIAGNOSIS — I25.5 ISCHEMIC CARDIOMYOPATHY: ICD-10-CM

## 2023-10-26 DIAGNOSIS — Z95.2 HX OF ARTIFICIAL HEART VALVE REPLACEMENT: ICD-10-CM

## 2023-10-26 DIAGNOSIS — I10 ESSENTIAL HYPERTENSION: ICD-10-CM

## 2023-10-26 PROCEDURE — 99214 OFFICE O/P EST MOD 30 MIN: CPT | Performed by: INTERNAL MEDICINE

## 2023-10-26 PROCEDURE — 93000 ELECTROCARDIOGRAM COMPLETE: CPT | Performed by: INTERNAL MEDICINE

## 2023-10-26 PROCEDURE — 1123F ACP DISCUSS/DSCN MKR DOCD: CPT | Performed by: INTERNAL MEDICINE

## 2023-10-26 NOTE — PROGRESS NOTES
capsule Take 1 capsule by mouth 2 times daily 6/21/23  Yes ProviderJoseph MD   carbidopa-levodopa (SINEMET CR)  MG per extended release tablet Take 2 tablet PO at bedtime for 90 days 7/12/23  Yes Tereso Chavez, DO   carbidopa-levodopa (SINEMET)  MG per tablet Take two tablets PO four times a day 7/12/23  Yes Tereso Chavez DO   warfarin (COUMADIN) 10 MG tablet TAKE 1 TABLET DAILY EXCEPT 5 MG EVERY MONDAY AND FRIDAY OR AS DIRECTED BY [facility] [phone] 6/27/23  Yes Tereso Chavez DO   atorvastatin (LIPITOR) 40 MG tablet TAKE 1 TABLET DAILY 3/30/23  Yes Huang Jay MD   metoprolol succinate (TOPROL XL) 25 MG extended release tablet TAKE 1 TABLET NIGHTLY  Patient taking differently: Pt is taking half a tablet daily. 3/30/23  Yes Tereso Chavez DO   aspirin 81 MG tablet Take 1 tablet by mouth daily   Yes ProviderJoseph MD        CURRENT Medications:  No current facility-administered medications for this visit. Allergies:  Amlodipine and Codeine           Review of Systems: SEE HPI   Constitutional: no unanticipated weight loss. There's been no change in energy level, sleep pattern, or activity level. No fevers, chills. Eyes: No visual changes or diplopia. No scleral icterus. ENT: No Headaches, hearing loss or vertigo. No mouth sores or sore throat. Cardiovascular: No Chest pain, tightness or discomfort. No Shortness of breath. No Dyspnea on exertion, Orthopnea, Paroxysmal nocturnal dyspnea or breathlessness at rest.  No Palpitations. No Syncope ('blackouts', 'faints', 'collapse') or dizziness. Respiratory: No cough or wheezing, no sputum production. No hematemesis. Gastrointestinal: No abdominal pain, appetite loss, blood in stools. No change in bowel or bladder habits. Genitourinary: No dysuria, trouble voiding, or hematuria. Musculoskeletal:  No gait disturbance, no joint complaints. Integumentary: No rash or pruritis.   Neurological: No

## 2023-11-14 ENCOUNTER — OFFICE VISIT (OUTPATIENT)
Dept: FAMILY MEDICINE CLINIC | Age: 88
End: 2023-11-14
Payer: MEDICARE

## 2023-11-14 DIAGNOSIS — R63.4 WEIGHT LOSS: Primary | ICD-10-CM

## 2023-11-14 DIAGNOSIS — G20.A2 PARKINSON'S DISEASE WITHOUT DYSKINESIA, WITH FLUCTUATING MANIFESTATIONS: ICD-10-CM

## 2023-11-14 PROCEDURE — 99214 OFFICE O/P EST MOD 30 MIN: CPT | Performed by: FAMILY MEDICINE

## 2023-11-14 PROCEDURE — 1123F ACP DISCUSS/DSCN MKR DOCD: CPT | Performed by: FAMILY MEDICINE

## 2023-11-14 NOTE — PROGRESS NOTES
2023 David Boyer (:  1934) is a 80 y.o. male, here for evaluation of the following medical concerns:    HPI  Patient is following up concerning chronic medical conditions. Recent fell injured his head and face,  never went to the ER, didn't have LOC, feeling better, sore at this time. Parkinson's disease- patient has reduced his medication due to BP becoming too low, he was sleeping, tremors are more intense, could be secondary to fall. Patient is losing weight, poor diet at this time. Hx of artifical valve replacement- continue on mediation as prescribed, he is following with cardiologist.      Today, he denied chest pain, sob, n, v ,or diarrhea. Review of Systems   Constitutional:  Positive for fatigue. Negative for activity change, fever and unexpected weight change. Eyes:  Negative for discharge and visual disturbance. Respiratory:  Negative for cough and shortness of breath. Cardiovascular:  Negative for chest pain, palpitations and leg swelling. Gastrointestinal:  Negative for abdominal pain, diarrhea, nausea and vomiting. Endocrine: Negative for cold intolerance, heat intolerance, polydipsia and polyphagia. Genitourinary:  Negative for difficulty urinating. Musculoskeletal:  Positive for arthralgias. Skin:  Negative for rash. Neurological:  Positive for dizziness and weakness. Negative for syncope, light-headedness and headaches. Psychiatric/Behavioral:  Negative for dysphoric mood. The patient is not nervous/anxious. Prior to Visit Medications    Medication Sig Taking?  Authorizing Provider   selegiline (ELDEPRYL) 5 MG capsule Take 1 capsule by mouth 2 times daily  Provider, MD Joseph   carbidopa-levodopa (SINEMET CR)  MG per extended release tablet Take 2 tablet PO at bedtime for 90 days  Tereso Chavez,    carbidopa-levodopa (SINEMET)  MG per tablet Take two tablets PO four times a day  Tereso Chavez, DO

## 2023-11-27 ENCOUNTER — TELEPHONE (OUTPATIENT)
Dept: FAMILY MEDICINE CLINIC | Age: 88
End: 2023-11-27

## 2023-11-27 ASSESSMENT — ENCOUNTER SYMPTOMS
ABDOMINAL PAIN: 0
SHORTNESS OF BREATH: 0
NAUSEA: 0
VOMITING: 0
COUGH: 0
DIARRHEA: 0
EYE DISCHARGE: 0

## 2023-11-27 NOTE — TELEPHONE ENCOUNTER
Gurwinder Hinton from [facility] ([phone]) states patient was d/terri from [facility] from a fall that occurred on 11-24-23.   Need to know if doctor will follow and sign home care orders

## 2023-11-30 ENCOUNTER — TELEPHONE (OUTPATIENT)
Dept: FAMILY MEDICINE CLINIC | Age: 88
End: 2023-11-30

## 2023-11-30 NOTE — TELEPHONE ENCOUNTER
Please give verbal order.   Also the they need to discuss with cardiology in order to adjust medication

## 2023-11-30 NOTE — TELEPHONE ENCOUNTER
Eunice Anderson the PT with [facility] called requesting verbal orders PT and Nursing. The PT will be 2w1, 3w2 and 2w6 total of nine weeks. Also asking for skilled nursing. When he stood up today his bp dropped to 90/ 60 and dizzy while standing.  Please give Eunice Anderson a call [phone]

## 2023-12-04 ENCOUNTER — TELEPHONE (OUTPATIENT)
Dept: FAMILY MEDICINE CLINIC | Age: 88
End: 2023-12-04

## 2023-12-04 ENCOUNTER — ANTI-COAG VISIT (OUTPATIENT)
Dept: PHARMACY | Age: 88
End: 2023-12-04
Payer: MEDICARE

## 2023-12-04 DIAGNOSIS — Z79.01 ANTICOAGULATED ON COUMADIN: Primary | ICD-10-CM

## 2023-12-04 DIAGNOSIS — Z95.2 HX OF ARTIFICIAL HEART VALVE REPLACEMENT: ICD-10-CM

## 2023-12-04 LAB — INTERNATIONAL NORMALIZATION RATIO, POC: 2.5

## 2023-12-04 PROCEDURE — 85610 PROTHROMBIN TIME: CPT

## 2023-12-04 PROCEDURE — 99213 OFFICE O/P EST LOW 20 MIN: CPT

## 2023-12-04 NOTE — PROGRESS NOTES
points  BASIC ASSESSMENT UNCOMPLICATED (including but not limited to: vital signs; physical assessment screening; review of secondary markers like lab results, allergies, home readings; instructions on treatment plan and basic education; assessment of medication list with one med change and compliance) - 2 points     For Pharmacy Admin Tracking Only    Intervention Detail: Dose Adjustment: 1, reason: Therapy Optimization  Total # of Interventions Recommended: 1  Total # of Interventions Accepted: 1  Time Spent (min): 15     Dilma Barnett PharmD 12/4/23  9:58 AM

## 2023-12-04 NOTE — TELEPHONE ENCOUNTER
Jud March the PT with [facility] called to let Dr Erica Fierro know that the pt fell on Friday while walking to the kitchen with his walker. He was told by wife to go sit down and when he turned he lost his balance. He skinned and has bruising above his left elbow and rib cage on l;eft side. The pain is severe. She states no fractures that she can see. She is not sure about his rib cage area. Pt has an appt tomorrow . If any questions give Jud March a call [phone].

## 2023-12-05 ENCOUNTER — OFFICE VISIT (OUTPATIENT)
Dept: FAMILY MEDICINE CLINIC | Age: 88
End: 2023-12-05

## 2023-12-05 ENCOUNTER — HOSPITAL ENCOUNTER (OUTPATIENT)
Age: 88
Discharge: HOME | End: 2023-12-05
Payer: MEDICARE

## 2023-12-05 ENCOUNTER — HOSPITAL ENCOUNTER (OUTPATIENT)
Dept: GENERAL RADIOLOGY | Age: 88
Discharge: HOME | End: 2023-12-05
Payer: MEDICARE

## 2023-12-05 VITALS
BODY MASS INDEX: 21.47 KG/M2 | WEIGHT: 150 LBS | HEIGHT: 70 IN | SYSTOLIC BLOOD PRESSURE: 130 MMHG | DIASTOLIC BLOOD PRESSURE: 82 MMHG

## 2023-12-05 DIAGNOSIS — S22.32XA CLOSED FRACTURE OF ONE RIB OF LEFT SIDE, INITIAL ENCOUNTER: ICD-10-CM

## 2023-12-05 DIAGNOSIS — G20.A2 PARKINSON'S DISEASE WITH FLUCTUATING MANIFESTATIONS, UNSPECIFIED WHETHER DYSKINESIA PRESENT: Primary | ICD-10-CM

## 2023-12-05 DIAGNOSIS — M25.512 LEFT SHOULDER PAIN, UNSPECIFIED CHRONICITY: ICD-10-CM

## 2023-12-05 LAB
BILIRUBIN, POC: NORMAL
BLOOD URINE, POC: NORMAL
CLARITY, POC: CLEAR
COLOR, POC: YELLOW
GLUCOSE URINE, POC: NORMAL
KETONES, POC: NORMAL
LEUKOCYTE EST, POC: NORMAL
NITRITE, POC: NORMAL
PH, POC: 6
PROTEIN, POC: NORMAL
SPECIFIC GRAVITY, POC: 1.02
UROBILINOGEN, POC: 0.2

## 2023-12-05 PROCEDURE — 73030 X-RAY EXAM OF SHOULDER: CPT

## 2023-12-05 PROCEDURE — 71101 X-RAY EXAM UNILAT RIBS/CHEST: CPT

## 2023-12-06 LAB — BACTERIA UR CULT: NORMAL

## 2023-12-07 ENCOUNTER — TELEPHONE (OUTPATIENT)
Dept: FAMILY MEDICINE CLINIC | Age: 88
End: 2023-12-07

## 2023-12-07 NOTE — TELEPHONE ENCOUNTER
She probably already knows this but she needs to check all 10 fingers.  If his oxygen is below 88 he is going to feel poorly and is at risk for becoming dizzy and falling.  Please let us know what the new reading are.  Thank you.

## 2023-12-07 NOTE — TELEPHONE ENCOUNTER
Ivon the OT with [facility] called to let Dr Chavez know she is with the pt for a home visit. She states that all of his vitals are ok. She had problems with pulse ox. When she got it to work it was between 83-85% while sitting and no dizziness. She is trying to get it while he is standing and it was 63% and no dizziness.  Pt wife states that he was not using his walker and fell to knees in front of his chair before Ivon arrived. She is concerned with the pt pulse. I transferred the call to the MA.  Ivon [phone]

## 2023-12-07 NOTE — TELEPHONE ENCOUNTER
Spoke with Ivon, she stated patients oxygen has been ranging from 77%-85% while sitting. Standing it is at 63%. Patient reports NO dizziness, no confusion, he is alert and aware of his surroundings, date, place, per Ivon. Patient stated he feels fine and does not want to go to ER. She stated he had a fall today but caught himself on his knees and was able to stabilize. Ivon stated she just had to report, she said everything else all week has been good with that patient, just the oxygen has been fluctuating.

## 2023-12-20 ENCOUNTER — TELEPHONE (OUTPATIENT)
Dept: FAMILY MEDICINE CLINIC | Age: 88
End: 2023-12-20

## 2023-12-20 NOTE — TELEPHONE ENCOUNTER
Nirali from [facility] ([phone]) called saying pts bp is low in the morning, he has ortjo static hypotention.  He is drinking 6-8 cups of water with little urination

## 2023-12-22 ENCOUNTER — TELEPHONE (OUTPATIENT)
Dept: CARDIOLOGY CLINIC | Age: 88
End: 2023-12-22

## 2023-12-22 ENCOUNTER — ANTI-COAG VISIT (OUTPATIENT)
Dept: PHARMACY | Age: 88
End: 2023-12-22
Payer: MEDICARE

## 2023-12-22 DIAGNOSIS — Z79.01 ANTICOAGULATED ON COUMADIN: Primary | ICD-10-CM

## 2023-12-22 DIAGNOSIS — Z95.2 HX OF ARTIFICIAL HEART VALVE REPLACEMENT: ICD-10-CM

## 2023-12-22 LAB — INTERNATIONAL NORMALIZATION RATIO, POC: 7.1

## 2023-12-22 PROCEDURE — 99211 OFF/OP EST MAY X REQ PHY/QHP: CPT | Performed by: SPEECH-LANGUAGE PATHOLOGIST

## 2023-12-22 PROCEDURE — 85610 PROTHROMBIN TIME: CPT | Performed by: SPEECH-LANGUAGE PATHOLOGIST

## 2023-12-22 NOTE — TELEPHONE ENCOUNTER
Bell Varela called in stating that his PCP doctor wants him to see Misachandrika Beth soon. I don't see any appt. Until March.     Bell Varela can be reached at [phone]

## 2023-12-27 ENCOUNTER — ANTI-COAG VISIT (OUTPATIENT)
Dept: PHARMACY | Age: 88
End: 2023-12-27
Payer: MEDICARE

## 2023-12-27 DIAGNOSIS — Z79.01 ANTICOAGULATED ON COUMADIN: Primary | ICD-10-CM

## 2023-12-27 DIAGNOSIS — Z95.2 HX OF ARTIFICIAL HEART VALVE REPLACEMENT: ICD-10-CM

## 2023-12-27 LAB — INTERNATIONAL NORMALIZATION RATIO, POC: 2.1

## 2023-12-27 PROCEDURE — 85610 PROTHROMBIN TIME: CPT

## 2023-12-27 PROCEDURE — 99212 OFFICE O/P EST SF 10 MIN: CPT

## 2023-12-27 NOTE — PROGRESS NOTES
Juan Rizvi is a 80 y.o. here for warfarin management. Suad Borjas had an INR test today. Results were reviewed and appropriate warfarin management was completed. Patient verifies current warfarin dosing regimen: Yes     Warfarin medication reviewed and updated on the patient 's home medication list: Yes   All other medications reviewed and updated on the patient 's home medication list: Yes     Lab Results   Component Value Date    INR 2.1 2023    INR 7.1 2023    INR 2.5 2023       Anticoagulation Summary  As of 2023      INR goal:  2.5-3.5   TTR:  62.5 % (3 y)   INR used for dosin.1 (2023)   Warfarin maintenance plan:  10 mg (10 mg x 1) every Mon, Wed, Fri; 5 mg (10 mg x 0.5) all other days   Weekly warfarin total:  50 mg   Plan last modified:  Quang Arshad, [facility] (2023)   Next INR check:  2024   Priority:  Maintenance   Target end date: Indefinite    Indications    Anticoagulated on Coumadin [Z79.01]  Hx of artificial heart valve replacement [Z95.2]                 Anticoagulation Episode Summary       INR check location:      Preferred lab:      Send INR reminders to:  [facility]    Comments:  EPIC  Consent: 23          Anticoagulation Care Providers       Provider Role Specialty Phone number    Ivett Burroughs  Referring [facility] [phone]          There were no vitals taken for this visit. Warfarin assessment / plan:     Appears well  Sub-therapeutic INR        INR has come down since last check after holding a few doses. Will go ahead and lower the weekly dose today and check again in 2 weeks. Description    NEW DOSE: Warfarin 10 mg (one tablet) Monday, Wednesday and Friday then 5 mg (one-half tablet) all other days. Call [phone] with signs or symptoms of bleeding or ANY medication changes (including over-the-counter medications or herbal supplements).        If significant bleeding occurs please

## 2023-12-29 ENCOUNTER — TELEPHONE (OUTPATIENT)
Dept: FAMILY MEDICINE CLINIC | Age: 88
End: 2023-12-29

## 2023-12-29 NOTE — PROGRESS NOTES
Anthony Bowen is a 80 y.o. here for warfarin management. Vanda Asif had an INR test today. Results were reviewed and appropriate warfarin management was completed. Patient verifies current warfarin dosing regimen: Yes     Warfarin medication reviewed and updated on the patient 's home medication list: Yes   All other medications reviewed and updated on the patient 's home medication list: Yes     Lab Results   Component Value Date    INR 2.1 2023    INR 7.1 2023    INR 2.5 2023     Patient Findings       Positives:  Change in activity, Change in diet/appetite    Negatives:  Signs/symptoms of thrombosis, Signs/symptoms of bleeding, Change in health, Change in alcohol use, Missed doses, Extra doses, Change in medications, Bruising          Anticoagulation Summary  As of 2023      INR goal:  2.5-3.5   TTR:  62.7 % (3 y)   INR used for dosin.1 (2023)   Warfarin maintenance plan:  5 mg (10 mg x 0.5) every Mon, Wed, Fri; 10 mg (10 mg x 1) all other days   Weekly warfarin total:  55 mg   Plan last modified:  Lynette Zhang, [facility] (2023)   Next INR check:  2023   Priority:  Maintenance   Target end date: Indefinite    Indications    Anticoagulated on Coumadin [Z79.01]  Hx of 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 Warfarin assessment / plan:     Appears well  Supra-therapeutic INR. Denies signs and symptoms of bleeding/bruising. Denies medication changes. Denies extra warfarin doses. Denies increased alcohol intake. Denies change in appetite.   Denies illness, fever,

## 2023-12-29 NOTE — TELEPHONE ENCOUNTER
Vivian with [address] called to inform Dr. Evelina Obregon that pt's BP is running low; 98/67, 102/82, and 67/? are the readings she got today. Pt is experiencing lightheadedness and dizziness as well. He is also already a high fall risk with a walker and parkinson's.

## 2023-12-29 NOTE — TELEPHONE ENCOUNTER
Let the family know I talked to Dr. Donivan Sever he suggested increased fluid, being liberal with salt and compression stockings.   He is not wanting to start the medication to increase his BP due to his heart

## 2024-01-03 NOTE — PROGRESS NOTES
[facility]  Cardiology Consult    Leti Clinton  2/19/1934 January 9, 2024    PCP: Dr. COREY Chavez   Former Cardiologist: Dr. Homero Gonzales     Reason for Referral: Hypotension    CC: \"l have had some falls\"      Subjective:     History of Present Illness:    Leti Clinton is a 89 y.o. patient with a PMH significant for Parkinson's, HTN, mechanical AVR 1991 in [facility] on Coumadin (PCP managed), Aspirin, Toprol-xl  therapy.   Today, he is here with his son, he reports that he has been falling. He reports that The home health nurse has notice orthostatic hypotension. His son reports that they are dealing with Parkinson's as well.   Patient denies exertional chest pain/pressure, dyspnea at rest, DALTON, PND, orthopnea, palpitations, lightheadedness, weight changes, changes in LE edema, and syncope. Patient reports compliance to his medications.     Past Medical History:   has a past medical history of Colon polyps, Mechanical heart valve present, and S/P aortic valve replacement with metallic valve.    Surgical History:   has a past surgical history that includes Aortic valve replacement; Colonoscopy (2008); Colonoscopy (08/01/2012); eye surgery; Colonoscopy (2018); and Diagnostic Cardiac Cath Lab Procedure (05/09/2022).     Social History:   reports that he has never smoked. He has never used smokeless tobacco. He reports current alcohol use of about 1.0 standard drink of alcohol per week. He reports that he does not use drugs.     Family History:  family history includes Breast Cancer in his mother and sister; Cancer in his mother; Heart Disease in his mother; Parkinsonism in his father; Prostate Cancer in his maternal grandfather; Stomach Cancer in his paternal grandfather.    Home Medications:  Were reviewed and are listed in nursing record and/or below  Prior to Admission medications    Medication Sig Start Date End Date Taking? Authorizing Provider   diclofenac sodium (VOLTAREN) 1 % GEL

## 2024-01-05 ENCOUNTER — TELEPHONE (OUTPATIENT)
Dept: CARDIOLOGY CLINIC | Age: 89
End: 2024-01-05

## 2024-01-05 NOTE — TELEPHONE ENCOUNTER
Nirali from [facility] called in this afternoon, she states she visited with patient today, she states he has an arrhythmia and a weak cough, that she is concerned about. She would like for someone to reach out to patient.      Nirali can be reached at [phone].

## 2024-01-08 ENCOUNTER — ANTI-COAG VISIT (OUTPATIENT)
Dept: PHARMACY | Age: 89
End: 2024-01-08
Payer: MEDICARE

## 2024-01-08 DIAGNOSIS — Z79.01 ANTICOAGULATED ON COUMADIN: Primary | ICD-10-CM

## 2024-01-08 DIAGNOSIS — Z95.2 HX OF ARTIFICIAL HEART VALVE REPLACEMENT: ICD-10-CM

## 2024-01-08 LAB — INTERNATIONAL NORMALIZATION RATIO, POC: 1.7

## 2024-01-08 PROCEDURE — 85610 PROTHROMBIN TIME: CPT

## 2024-01-08 PROCEDURE — 99212 OFFICE O/P EST SF 10 MIN: CPT

## 2024-01-08 NOTE — PROGRESS NOTES
Leti Clinton is a 89 y.o. here for warfarin management.  Leti had an INR test today. Results were reviewed and appropriate warfarin management was completed.     Patient verifies current warfarin dosing regimen: Yes     Warfarin medication reviewed and updated on the patient 's home medication list: Yes   All other medications reviewed and updated on the patient 's home medication list: No new medications     Lab Results   Component Value Date    INR 1.7 2024    INR 2.1 2023    INR 7.1 2023     Patient Findings       Negatives:  Signs/symptoms of thrombosis, Signs/symptoms of bleeding, Change in health, Missed doses, Change in medications, Change in diet/appetite, Bruising          Anticoagulation Summary  As of 2024      INR goal:  2.5-3.5   TTR:  61.8 % (3 y)   INR used for dosin.7 (2024)   Warfarin maintenance plan:  5 mg (10 mg x 0.5) every Sun, e, Thu; 10 mg (10 mg x 1) all other days   Weekly warfarin total:  55 mg   Plan last modified:  Robert West RPH (2024)   Next INR check:  1/15/2024   Priority:  Maintenance   Target end date:  Indefinite    Indications    Anticoagulated on Coumadin [Z79.01]  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    Warfarin assessment / plan:     Appears well  Sub-therapeutic INR     Denies missed doses.  Denies increased vitamin K intake.  Denies medication changes.  Denies signs or symptoms of clotting.  Denies signs or symptoms of a stroke     Leti's INR is still low despite last increase in weekly dosing.  No medication changes or diet

## 2024-01-08 NOTE — TELEPHONE ENCOUNTER
Please inform her that patient has an appointment with Dr. Wolff tomorrow, 1/9/2024 at [facility] at 345pm        Thanks

## 2024-01-09 ENCOUNTER — OFFICE VISIT (OUTPATIENT)
Dept: CARDIOLOGY CLINIC | Age: 89
End: 2024-01-09

## 2024-01-09 ENCOUNTER — TELEPHONE (OUTPATIENT)
Dept: CARDIOLOGY CLINIC | Age: 89
End: 2024-01-09

## 2024-01-09 VITALS
WEIGHT: 150.1 LBS | BODY MASS INDEX: 21.49 KG/M2 | HEIGHT: 70 IN | HEART RATE: 76 BPM | OXYGEN SATURATION: 98 % | DIASTOLIC BLOOD PRESSURE: 70 MMHG | SYSTOLIC BLOOD PRESSURE: 118 MMHG

## 2024-01-09 DIAGNOSIS — I25.5 ISCHEMIC CARDIOMYOPATHY: ICD-10-CM

## 2024-01-09 DIAGNOSIS — I10 ESSENTIAL HYPERTENSION: ICD-10-CM

## 2024-01-09 DIAGNOSIS — E78.2 MIXED HYPERLIPIDEMIA: ICD-10-CM

## 2024-01-09 DIAGNOSIS — Z95.2 HX OF ARTIFICIAL HEART VALVE REPLACEMENT: ICD-10-CM

## 2024-01-09 DIAGNOSIS — I25.10 CORONARY ARTERY DISEASE INVOLVING NATIVE CORONARY ARTERY OF NATIVE HEART WITHOUT ANGINA PECTORIS: Primary | ICD-10-CM

## 2024-01-09 DIAGNOSIS — I95.1 ORTHOSTATIC HYPOTENSION DYSAUTONOMIC SYNDROME: ICD-10-CM

## 2024-01-15 ENCOUNTER — ANTI-COAG VISIT (OUTPATIENT)
Dept: PHARMACY | Age: 89
End: 2024-01-15
Payer: MEDICARE

## 2024-01-15 DIAGNOSIS — Z79.01 ANTICOAGULATED ON COUMADIN: Primary | ICD-10-CM

## 2024-01-15 DIAGNOSIS — Z95.2 HX OF ARTIFICIAL HEART VALVE REPLACEMENT: ICD-10-CM

## 2024-01-15 LAB — INTERNATIONAL NORMALIZATION RATIO, POC: 2.4

## 2024-01-15 PROCEDURE — 99212 OFFICE O/P EST SF 10 MIN: CPT

## 2024-01-15 PROCEDURE — 85610 PROTHROMBIN TIME: CPT

## 2024-01-15 NOTE — PROGRESS NOTES
Leti Clinton is a 89 y.o. here for warfarin management.  Leti had an INR test today. Results were reviewed and appropriate warfarin management was completed.     Patient verifies current warfarin dosing regimen: Yes     Warfarin medication reviewed and updated on the patient 's home medication list: Yes   All other medications reviewed and updated on the patient 's home medication list: No new medications     Lab Results   Component Value Date    INR 2.4 01/15/2024    INR 1.7 2024    INR 2.1 2023     Patient Findings       Negatives:  Signs/symptoms of thrombosis, Signs/symptoms of bleeding, Change in health, Missed doses, Change in medications, Change in diet/appetite, Bruising    Comments:  Weight loss of 15 lbs. over the past year          Anticoagulation Summary  As of 1/15/2024      INR goal:  2.5-3.5   TTR:  61.4 % (3 y)   INR used for dosin.4 (1/15/2024)   Warfarin maintenance plan:  5 mg (10 mg x 0.5) every Sun, Thu; 10 mg (10 mg x 1) all other days   Weekly warfarin total:  60 mg   Plan last modified:  Robert West RPH (1/15/2024)   Next INR check:  2024   Priority:  Maintenance   Target end date:  Indefinite    Indications    Anticoagulated on Coumadin [Z79.01]  Hx of artificial heart valve replacement [Z95.2]                 Anticoagulation Episode Summary       INR check location:      Preferred lab:      Send INR reminders to:  [facility]    Comments:  EPIC  Consent: 23          Anticoagulation Care Providers       Provider Role Specialty Phone number    Tereso Chavez DO Referring Family Medicine [phone]          There were no vitals taken for this visit.    Warfarin assessment / plan:     Appears well  Sub-therapeutic INR     Denies missed doses.  Denies increased vitamin K intake.  Denies medication changes.  Denies signs or symptoms of clotting.  Denies signs or symptoms of a stroke     Leti's INR is a little low today.  Made a

## 2024-01-25 ENCOUNTER — ANTI-COAG VISIT (OUTPATIENT)
Dept: PHARMACY | Age: 89
End: 2024-01-25
Payer: MEDICARE

## 2024-01-25 DIAGNOSIS — Z95.2 HX OF ARTIFICIAL HEART VALVE REPLACEMENT: ICD-10-CM

## 2024-01-25 DIAGNOSIS — Z79.01 ANTICOAGULATED ON COUMADIN: Primary | ICD-10-CM

## 2024-01-25 LAB — INTERNATIONAL NORMALIZATION RATIO, POC: 2.9

## 2024-01-25 PROCEDURE — 99211 OFF/OP EST MAY X REQ PHY/QHP: CPT | Performed by: INTERNAL MEDICINE

## 2024-01-25 PROCEDURE — 85610 PROTHROMBIN TIME: CPT | Performed by: INTERNAL MEDICINE

## 2024-01-25 NOTE — PROGRESS NOTES
Leti Clinton is a 89 y.o. here for warfarin management.  Leti had an INR test today. Results were reviewed and appropriate warfarin management was completed.     Patient verifies current warfarin dosing regimen: Yes     Warfarin medication reviewed and updated on the patient 's home medication list: Yes   All other medications reviewed and updated on the patient 's home medication list: No: No changes     Lab Results   Component Value Date    INR 2.9 2024    INR 2.4 01/15/2024    INR 1.7 2024       Anticoagulation Summary  As of 2024      INR goal:  2.5-3.5   TTR:  61.6 % (3 y)   INR used for dosin.9 (2024)   Warfarin maintenance plan:  5 mg (10 mg x 0.5) every Sun, Thu; 10 mg (10 mg x 1) all other days   Weekly warfarin total:  60 mg   Plan last modified:  Robert West RPH (1/15/2024)   Next INR check:  2024   Priority:  Maintenance   Target end date:  Indefinite    Indications    Anticoagulated on Coumadin [Z79.01]  Hx of artificial heart valve replacement [Z95.2]                 Anticoagulation Episode Summary       INR check location:      Preferred lab:      Send INR reminders to:  [facility]    Comments:  EPIC  Consent: 1/15/24  son = Ricardo          Anticoagulation Care Providers       Provider Role Specialty Phone number    Scott Tereso JACKSON DO Referring Family Medicine [phone]          There were no vitals taken for this visit.    Warfarin assessment / plan:     Patient appears well today.      No changes affecting warfarin therapy were noted.   No acute findings with regards to warfarin therapy.  INR today is within goal range.     Instructed to continue the same weekly warfarin dose.    INR within range, pt wishes to go back to 4 week appointments.     Description    Warfarin 10 mg (one tablet) daily except 5 mg on Sundays and   Ensure 1 can daily (adds an extra 1-2 cans per week)     Call [phone] with signs or symptoms of

## 2024-02-09 RX ORDER — WARFARIN SODIUM 10 MG/1
TABLET ORAL
Qty: 90 TABLET | Refills: 2 | Status: SHIPPED | OUTPATIENT
Start: 2024-02-09

## 2024-02-09 RX ORDER — WARFARIN SODIUM 10 MG/1
TABLET ORAL
Qty: 90 TABLET | Refills: 2 | OUTPATIENT
Start: 2024-02-09

## 2024-02-09 NOTE — TELEPHONE ENCOUNTER
Last office visit 12/5/2023       Next office visit scheduled 2/9/2024    Requested Prescriptions     Pending Prescriptions Disp Refills    carbidopa-levodopa (SINEMET)  MG per tablet 720 tablet 3     Sig: Take two tablets PO four times a day

## 2024-02-09 NOTE — TELEPHONE ENCOUNTER
Last office visit 12/5/2023       Next office visit scheduled 2/9/2024    Requested Prescriptions     Pending Prescriptions Disp Refills    warfarin (COUMADIN) 10 MG tablet 90 tablet 2     Sig: TAKE 1 TABLET DAILY EXCEPT 5 MG EVERY MONDAY AND FRIDAY OR AS DIRECTED BY [facility] [phone]

## 2024-02-09 NOTE — TELEPHONE ENCOUNTER
Last office visit 12/5/2023       Next office visit scheduled Visit date not found    Requested Prescriptions     Pending Prescriptions Disp Refills    warfarin (COUMADIN) 10 MG tablet 90 tablet 2     Sig: TAKE 1 TABLET DAILY EXCEPT 5 MG EVERY MONDAY AND FRIDAY OR AS DIRECTED BY [facility] [phone]

## 2024-02-21 ENCOUNTER — ANTI-COAG VISIT (OUTPATIENT)
Dept: PHARMACY | Age: 89
End: 2024-02-21
Payer: MEDICARE

## 2024-02-21 DIAGNOSIS — Z79.01 ANTICOAGULATED ON COUMADIN: Primary | ICD-10-CM

## 2024-02-21 DIAGNOSIS — Z95.2 HX OF ARTIFICIAL HEART VALVE REPLACEMENT: ICD-10-CM

## 2024-02-21 LAB — INR BLD: 2.4

## 2024-02-21 PROCEDURE — 85610 PROTHROMBIN TIME: CPT

## 2024-02-21 PROCEDURE — 99212 OFFICE O/P EST SF 10 MIN: CPT

## 2024-02-21 NOTE — PROGRESS NOTES
Leti Clinton is a 89 y.o. here for warfarin management.  Leti had an INR test today. Results were reviewed and appropriate warfarin management was completed.     Patient verifies current warfarin dosing regimen: Yes     Warfarin medication reviewed and updated on the patient 's home medication list: Yes   All other medications reviewed and updated on the patient 's home medication list: Yes     Lab Results   Component Value Date    INR 2.40 2024    INR 2.9 2024    INR 2.4 01/15/2024     Patient Findings       Negatives:  Signs/symptoms of thrombosis, Signs/symptoms of bleeding, Laboratory test error suspected, Change in health, Missed doses, Extra doses, Change in medications, Change in diet/appetite, Bruising          Anticoagulation Summary  As of 2024      INR goal:  2.5-3.5   TTR:  62.0 % (3.1 y)   INR used for dosin.40 (2024)   Warfarin maintenance plan:  5 mg (10 mg x 0.5) every Sun, Thu; 10 mg (10 mg x 1) all other days   Weekly warfarin total:  60 mg   Plan last modified:  Robert West RP (1/15/2024)   Next INR check:  3/20/2024   Priority:  Maintenance   Target end date:  Indefinite    Indications    Anticoagulated on Coumadin [Z79.01]  Hx of artificial heart valve replacement [Z95.2]                 Anticoagulation Episode Summary       INR check location:      Preferred lab:      Send INR reminders to:  [facility]    Comments:  EPIC  Consent: 1/15/24  son = Ricardo          Anticoagulation Care Providers       Provider Role Specialty Phone number    Tereso Chavez DO Referring Family Medicine [phone]          There were no vitals taken for this visit.    Warfarin assessment / plan:     Appears well  Sub-therapeutic INR     Denies missed doses.  Denies increased vitamin K intake.  Denies medication changes.     Patient denies medication, diet, or warfarin schedule changes. No apparent reason for low INR today. Will increase to 10 mg on

## 2024-03-01 ENCOUNTER — OFFICE VISIT (OUTPATIENT)
Dept: FAMILY MEDICINE CLINIC | Age: 89
End: 2024-03-01
Payer: MEDICARE

## 2024-03-01 VITALS — DIASTOLIC BLOOD PRESSURE: 85 MMHG | SYSTOLIC BLOOD PRESSURE: 125 MMHG | BODY MASS INDEX: 21.38 KG/M2 | WEIGHT: 149 LBS

## 2024-03-01 DIAGNOSIS — I50.22 CHRONIC SYSTOLIC (CONGESTIVE) HEART FAILURE (HCC): ICD-10-CM

## 2024-03-01 DIAGNOSIS — I25.119 ATHEROSCLEROSIS OF NATIVE CORONARY ARTERY OF NATIVE HEART WITH ANGINA PECTORIS (HCC): ICD-10-CM

## 2024-03-01 DIAGNOSIS — R31.9 HEMATURIA, UNSPECIFIED TYPE: Primary | ICD-10-CM

## 2024-03-01 DIAGNOSIS — G45.9 TIA (TRANSIENT ISCHEMIC ATTACK): ICD-10-CM

## 2024-03-01 LAB
BASOPHILS # BLD: 0 K/UL (ref 0–0.2)
BASOPHILS NFR BLD: 0.2 %
BILIRUBIN, POC: NEGATIVE
BLOOD URINE, POC: NORMAL
CLARITY, POC: NORMAL
COLOR, POC: NORMAL
DEPRECATED RDW RBC AUTO: 15.1 % (ref 12.4–15.4)
EOSINOPHIL # BLD: 0 K/UL (ref 0–0.6)
EOSINOPHIL NFR BLD: 0.1 %
GLUCOSE URINE, POC: NEGATIVE
HCT VFR BLD AUTO: 39.5 % (ref 40.5–52.5)
HGB BLD-MCNC: 13.4 G/DL (ref 13.5–17.5)
KETONES, POC: NEGATIVE
LEUKOCYTE EST, POC: NEGATIVE
LYMPHOCYTES # BLD: 1.2 K/UL (ref 1–5.1)
LYMPHOCYTES NFR BLD: 17.2 %
MCH RBC QN AUTO: 30.8 PG (ref 26–34)
MCHC RBC AUTO-ENTMCNC: 34 G/DL (ref 31–36)
MCV RBC AUTO: 90.6 FL (ref 80–100)
MONOCYTES # BLD: 0.7 K/UL (ref 0–1.3)
MONOCYTES NFR BLD: 9.7 %
NEUTROPHILS # BLD: 5.1 K/UL (ref 1.7–7.7)
NEUTROPHILS NFR BLD: 72.8 %
NITRITE, POC: NEGATIVE
PH, POC: 6.5
PLATELET # BLD AUTO: 274 K/UL (ref 135–450)
PMV BLD AUTO: 8.6 FL (ref 5–10.5)
PROTEIN, POC: NORMAL
RBC # BLD AUTO: 4.36 M/UL (ref 4.2–5.9)
SPECIFIC GRAVITY, POC: 1.02
UROBILINOGEN, POC: NORMAL
WBC # BLD AUTO: 7 K/UL (ref 4–11)

## 2024-03-01 PROCEDURE — 1123F ACP DISCUSS/DSCN MKR DOCD: CPT | Performed by: FAMILY MEDICINE

## 2024-03-01 PROCEDURE — 36415 COLL VENOUS BLD VENIPUNCTURE: CPT | Performed by: FAMILY MEDICINE

## 2024-03-01 PROCEDURE — 99214 OFFICE O/P EST MOD 30 MIN: CPT | Performed by: FAMILY MEDICINE

## 2024-03-01 PROCEDURE — 81002 URINALYSIS NONAUTO W/O SCOPE: CPT | Performed by: FAMILY MEDICINE

## 2024-03-01 NOTE — PROGRESS NOTES
3/1/2024     Leti Clinton (:  1934) is a 89 y.o. male, here for evaluation of the following medical concerns:    HPI  Patient is following up concerning chronic medical conditions.   He is much improved as far as ambulation.     Hematuria- gross hematuria, on Warfarin, stated that it has improved, has had it for three day, hemoglobin is stable, will obtain a INR, questionable if we can stop it due to having recent TIA's? Patient denied pain or discomfort, will obtain culture, has never had this before.     TIA?- has had several episodes over the last month in which he was unable to speak and had right sided facial droop,  Is on Aspirin and Warfarin, will avoid Plavix due to his bleeding, very concerned, patient is not wanting to go to the hospital, stated all four episodes have resolved in less then several minutes.      Recent fall injured his head and face,  never went to the ER, didn't have LOC, feeling better, sore at this time.      Parkinson's disease- patient has reduced his medication due to BP becoming too low, he was sleeping, tremors are more intense, could be secondary to fall.      Hx of artifical valve replacement- continue on mediation as prescribed, he is following with cardiologist.      Hypotension- patient will stop his Imdur,numbers reduced in AM, will discuss with New.     Today, he denied chest pain, sob, n, v ,or diarrhea.   Review of Systems   Constitutional:  Positive for fatigue. Negative for activity change, fever and unexpected weight change.   HENT:  Negative for congestion, ear pain, facial swelling, hearing loss, rhinorrhea, sinus pressure, sore throat and trouble swallowing.    Eyes:  Negative for discharge and visual disturbance.   Respiratory:  Negative for cough, chest tightness, shortness of breath and wheezing.    Cardiovascular:  Negative for chest pain, palpitations and leg swelling.   Gastrointestinal:  Negative for abdominal pain, anal bleeding, blood in

## 2024-03-02 LAB
ALBUMIN SERPL-MCNC: 4.4 G/DL (ref 3.4–5)
ALBUMIN/GLOB SERPL: 2 {RATIO} (ref 1.1–2.2)
ALP SERPL-CCNC: 59 U/L (ref 40–129)
ALT SERPL-CCNC: 6 U/L (ref 10–40)
ANION GAP SERPL CALCULATED.3IONS-SCNC: 11 MMOL/L (ref 3–16)
AST SERPL-CCNC: 25 U/L (ref 15–37)
BILIRUB SERPL-MCNC: 0.4 MG/DL (ref 0–1)
BUN SERPL-MCNC: 28 MG/DL (ref 7–20)
CALCIUM SERPL-MCNC: 9.7 MG/DL (ref 8.3–10.6)
CHLORIDE SERPL-SCNC: 102 MMOL/L (ref 99–110)
CO2 SERPL-SCNC: 28 MMOL/L (ref 21–32)
CREAT SERPL-MCNC: 1 MG/DL (ref 0.8–1.3)
GFR SERPLBLD CREATININE-BSD FMLA CKD-EPI: >60 ML/MIN/{1.73_M2}
GLUCOSE SERPL-MCNC: 75 MG/DL (ref 70–99)
POTASSIUM SERPL-SCNC: 4.4 MMOL/L (ref 3.5–5.1)
PROT SERPL-MCNC: 6.6 G/DL (ref 6.4–8.2)
SODIUM SERPL-SCNC: 141 MMOL/L (ref 136–145)

## 2024-03-04 ASSESSMENT — ENCOUNTER SYMPTOMS
SHORTNESS OF BREATH: 0
ABDOMINAL PAIN: 0
WHEEZING: 0
ANAL BLEEDING: 0
VOMITING: 0
TROUBLE SWALLOWING: 0
BLOOD IN STOOL: 0
EYE DISCHARGE: 0
RHINORRHEA: 0
SINUS PRESSURE: 0
NAUSEA: 0
DIARRHEA: 0
COUGH: 0
SORE THROAT: 0
CHEST TIGHTNESS: 0
FACIAL SWELLING: 0
BACK PAIN: 1

## 2024-03-07 ENCOUNTER — HOSPITAL ENCOUNTER (OUTPATIENT)
Dept: CT IMAGING | Age: 89
Discharge: HOME | End: 2024-03-07
Payer: MEDICARE

## 2024-03-07 DIAGNOSIS — R31.0 GROSS HEMATURIA: ICD-10-CM

## 2024-03-07 PROCEDURE — 74178 CT ABD&PLV WO CNTR FLWD CNTR: CPT

## 2024-03-07 PROCEDURE — 6360000004 HC RX CONTRAST MEDICATION

## 2024-03-07 RX ADMIN — IOPAMIDOL 75 ML: 755 INJECTION, SOLUTION INTRAVENOUS at 14:41

## 2024-03-20 ENCOUNTER — ANTI-COAG VISIT (OUTPATIENT)
Dept: PHARMACY | Age: 89
End: 2024-03-20
Payer: MEDICARE

## 2024-03-20 DIAGNOSIS — Z79.01 ANTICOAGULATED ON COUMADIN: Primary | ICD-10-CM

## 2024-03-20 DIAGNOSIS — Z95.2 HX OF ARTIFICIAL HEART VALVE REPLACEMENT: ICD-10-CM

## 2024-03-20 LAB — INTERNATIONAL NORMALIZATION RATIO, POC: 2.4

## 2024-03-20 PROCEDURE — 85610 PROTHROMBIN TIME: CPT

## 2024-03-20 PROCEDURE — 99211 OFF/OP EST MAY X REQ PHY/QHP: CPT

## 2024-03-20 NOTE — PROGRESS NOTES
Leti Clinton is a 89 y.o. here for warfarin management.  Leti had an INR test today. Results were reviewed and appropriate warfarin management was completed.     Patient verifies current warfarin dosing regimen: Yes     Warfarin medication reviewed and updated on the patient 's home medication list: Yes   All other medications reviewed and updated on the patient 's home medication list: Yes     Lab Results   Component Value Date    INR 2.4 2024    INR 2.40 2024    INR 2.9 2024     Patient Findings       Positives:  Change in activity    Negatives:  Signs/symptoms of bleeding, Change in medications, Change in diet/appetite    Comments:  Little blood in urine for a week to 8 days. Fairly significant  Has been gone for a week.  Has started strength training - squats, biceps           Anticoagulation Summary  As of 3/20/2024      INR goal:  2.5-3.5   TTR:  60.5 % (3.2 y)   INR used for dosin.4 (3/20/2024)   Warfarin maintenance plan:  5 mg (10 mg x 0.5) every Sun; 10 mg (10 mg x 1) all other days   Weekly warfarin total:  65 mg   Plan last modified:  Ishaan Olsen RPH (3/20/2024)   Next INR check:  2024   Priority:  Maintenance   Target end date:  Indefinite    Indications    Anticoagulated on Coumadin [Z79.01]  Hx of artificial heart valve replacement [Z95.2]                 Anticoagulation Episode Summary       INR check location:      Preferred lab:      Send INR reminders to:  [facility]    Comments:  EPIC  Consent: 1/15/24  son = Ricardo          Anticoagulation Care Providers       Provider Role Specialty Phone number    Tereso Chavez DO Referring Family Medicine [phone]          There were no vitals taken for this visit.    Warfarin assessment / plan:     Appears well  Sub-therapeutic INR     Recent increased activity.  Started some strength training. Not walking much yet due to weather, but sounds like he may do this when it gets warmer.    Given

## 2024-04-17 ENCOUNTER — ANTI-COAG VISIT (OUTPATIENT)
Dept: PHARMACY | Age: 89
End: 2024-04-17
Payer: MEDICARE

## 2024-04-17 DIAGNOSIS — Z79.01 ANTICOAGULATED ON COUMADIN: Primary | ICD-10-CM

## 2024-04-17 DIAGNOSIS — Z95.2 HX OF ARTIFICIAL HEART VALVE REPLACEMENT: ICD-10-CM

## 2024-04-17 LAB — INTERNATIONAL NORMALIZATION RATIO, POC: 3

## 2024-04-17 PROCEDURE — 99211 OFF/OP EST MAY X REQ PHY/QHP: CPT

## 2024-04-17 PROCEDURE — 85610 PROTHROMBIN TIME: CPT

## 2024-04-17 NOTE — PROGRESS NOTES
Leti Clinton is a 89 y.o. here for warfarin management.  Leti had an INR test today. Results were reviewed and appropriate warfarin management was completed.     Patient verifies current warfarin dosing regimen: Yes     Warfarin medication reviewed and updated on the patient 's home medication list: Yes   All other medications reviewed and updated on the patient 's home medication list: Yes     Lab Results   Component Value Date    INR 3.0 04/17/2024    INR 2.4 03/20/2024    INR 2.40 02/21/2024       Anticoagulation Summary  As of 4/17/2024      INR goal:  2.5-3.5   TTR:  61.1 % (3.3 y)   INR used for dosing:  3.0 (4/17/2024)   Warfarin maintenance plan:  5 mg (10 mg x 0.5) every Sun; 10 mg (10 mg x 1) all other days   Weekly warfarin total:  65 mg   Plan last modified:  Ishaan Olsen RPH (3/20/2024)   Next INR check:  5/22/2024   Priority:  Maintenance   Target end date:  Indefinite    Indications    Anticoagulated on Coumadin [Z79.01]  Hx of artificial heart valve replacement [Z95.2]                 Anticoagulation Episode Summary       INR check location:      Preferred lab:      Send INR reminders to:  [facility]    Comments:  EPIC  Consent: 1/15/24  son = Ricardo          Anticoagulation Care Providers       Provider Role Specialty Phone number    Tereso Chavez DO Referring Family Medicine [phone]          There were no vitals taken for this visit.    Warfarin assessment / plan:     Patient appears well today.      No changes affecting warfarin therapy were noted.   No acute findings with regards to warfarin therapy.  INR today is within goal range.     Instructed to continue the same weekly warfarin dose.        Description    CONTINUE DOSE: Warfarin 10 mg (one tablet) daily except 5 mg on Sundays  Ensure 1 can daily (adds an extra 1-2 cans per week)     Call [phone] with signs or symptoms of bleeding or ANY medication changes (including over-the-counter medications

## 2024-04-18 ENCOUNTER — PREP FOR PROCEDURE (OUTPATIENT)
Dept: OPHTHALMOLOGY | Age: 89
End: 2024-04-18

## 2024-04-18 RX ORDER — PHENYLEPHRINE HYDROCHLORIDE 25 MG/ML
1 SOLUTION/ DROPS OPHTHALMIC ONCE
Status: CANCELLED | OUTPATIENT
Start: 2024-04-18 | End: 2024-04-18

## 2024-04-18 RX ORDER — BRIMONIDINE TARTRATE 2 MG/ML
1 SOLUTION/ DROPS OPHTHALMIC ONCE
Status: CANCELLED | OUTPATIENT
Start: 2024-04-18 | End: 2024-04-18

## 2024-04-18 RX ORDER — TROPICAMIDE 10 MG/ML
1 SOLUTION/ DROPS OPHTHALMIC ONCE
Status: CANCELLED | OUTPATIENT
Start: 2024-04-18 | End: 2024-04-18

## 2024-04-19 ENCOUNTER — HOSPITAL ENCOUNTER (OUTPATIENT)
Dept: SURGERY | Age: 89
Discharge: HOME | End: 2024-04-19
Payer: MEDICARE

## 2024-04-19 VITALS — DIASTOLIC BLOOD PRESSURE: 66 MMHG | SYSTOLIC BLOOD PRESSURE: 139 MMHG

## 2024-04-19 PROCEDURE — 66821 AFTER CATARACT LASER SURGERY: CPT

## 2024-04-19 PROCEDURE — 6370000000 HC RX 637 (ALT 250 FOR IP): Performed by: OPHTHALMOLOGY

## 2024-04-19 RX ORDER — PHENYLEPHRINE HYDROCHLORIDE 25 MG/ML
1 SOLUTION/ DROPS OPHTHALMIC ONCE
Status: COMPLETED | OUTPATIENT
Start: 2024-04-19 | End: 2024-04-19

## 2024-04-19 RX ORDER — BRIMONIDINE TARTRATE 2 MG/ML
1 SOLUTION/ DROPS OPHTHALMIC ONCE
Status: COMPLETED | OUTPATIENT
Start: 2024-04-19 | End: 2024-04-19

## 2024-04-19 RX ORDER — TROPICAMIDE 10 MG/ML
1 SOLUTION/ DROPS OPHTHALMIC ONCE
Status: COMPLETED | OUTPATIENT
Start: 2024-04-19 | End: 2024-04-19

## 2024-04-19 RX ADMIN — TROPICAMIDE 1 DROP: 10 SOLUTION/ DROPS OPHTHALMIC at 07:07

## 2024-04-19 RX ADMIN — PHENYLEPHRINE HYDROCHLORIDE 1 DROP: 25 SOLUTION/ DROPS OPHTHALMIC at 07:07

## 2024-04-19 RX ADMIN — BRIMONIDINE TARTRATE 1 DROP: 2 SOLUTION/ DROPS OPHTHALMIC at 08:02

## 2024-04-19 NOTE — OP NOTE
[facility]  [address]  [address]   [place] [address]  [phone]      4/19/2024    Patient name: Leti Clinton  YOB: 1934  MRN: [id number]    Alleriges:   Allergies   Allergen Reactions    Amlodipine      Caused Dizziness  Caused Dizziness    Other reaction(s): dizziness per H&P    Codeine Nausea Only     derivatives       Pre-operative diagnosis:  After cataract obscuring vision of the right eye     Post-operative diagnosis:  Same    Procedure Performed:  YAG Capsulotomy of the right eye     Anesthesia:  None    Estimated Blood Loss: None    Specimens removed: None    Complications:  None    Description of Procedure:  The patient was taken to the laser suite at [facility] where the operative site was confirmed.  Topical ophthalmic anesthesia was instilled in the operative eye followed by dilating drops. A YAG capsulotomy lens with goniosol was placed on the operative eye.  The YAG laser was used to treat the posterior capsule opacification with 46  spots using a power of 2.8 mJ.  The total power used was 0.12 J.  The posterior capsule appeared nicely opened and clear centrally following the procedure.      Electronically signed by: Radha Fuentes MD,4/19/2024,8:24 AM

## 2024-04-19 NOTE — PROGRESS NOTES
Patient: Leti Clinton  2/19/1934, 89 y.o./male    Ambulatory to laser room.  Right eye marked and numbed by Dr. cosme.  Laser procedure done and tolerated well by patient.  Post  instructions given to Leti by provider.      Power setting was 2.8 mJ        # of shots was 46    Total power was 0.12 J    Cici Zapata RN

## 2024-04-19 NOTE — DISCHARGE INSTRUCTIONS
[facility]   [address], [place] [address]   [phone]     HOME CARE INSTRUCTIONS FOLLOWING LASER PROCEDURES         @ED@    Patient Name: Leti Clinton  : 1934  MRN: [id number]      Notify your physician if you have rash, hives, difficulty breathing, or severe nausea or vomiting.  Contact your family physician for questions concerning your current home medications.  If pain intensifies or pain is unrelieved with pain medicine as ordered, call your physician.  If physician is unavailable, go to the Emergency Room.    ADDITIONAL INSTRUCTIONS     Rest today       Report any decreased vision or increased pain.       Call the physician’s office to schedule your post op visit in: 2-3 weeks.    Medications: ***      You should have no severe pain after surgery. You may take Tylenol   (acetaminophen) for discomfort as needed as long as you do not have any liver problems. Your eye may feel irritated or scratchy. If pain becomes severe, call the doctor’s office immediately.  CALL THE OFFICE IMMEDIATELY IF YOU EXPERIENCE ANY OF THE FOLLOWING:  Sudden decrease in vision, severe pain, redness, discharge, new floaters, flashes of light, or veil over a portion of your vision.      Patient and/or responsible party verbalizes understanding of above instructions.    Tylenol is usually sufficient for any discomfort that you may feel.    It is not uncommon for you to experience the sensation of a large “floater” in your vision. If you experience hundreds of thousands of floaters that do not stop, flashing or arcing lights that do not stop, or a curtain or veil of blackness that you cannot see through, that does not go away, please call the office at [phone] or Toll Free [phone].     THERE IS AN EYE PHYSICIAN ON CALL 24 HOURS A DAY, SEVEN DAYS A WEEK--CALL FOR ANY QUESTIONS/PROBLEMS

## 2024-04-26 ENCOUNTER — OFFICE VISIT (OUTPATIENT)
Dept: CARDIOLOGY CLINIC | Age: 89
End: 2024-04-26
Payer: MEDICARE

## 2024-04-26 VITALS
OXYGEN SATURATION: 98 % | HEART RATE: 76 BPM | BODY MASS INDEX: 21.33 KG/M2 | DIASTOLIC BLOOD PRESSURE: 74 MMHG | WEIGHT: 149 LBS | HEIGHT: 70 IN | SYSTOLIC BLOOD PRESSURE: 116 MMHG

## 2024-04-26 DIAGNOSIS — I95.1 ORTHOSTATIC HYPOTENSION DYSAUTONOMIC SYNDROME: ICD-10-CM

## 2024-04-26 DIAGNOSIS — I25.10 CORONARY ARTERY DISEASE INVOLVING NATIVE CORONARY ARTERY OF NATIVE HEART WITHOUT ANGINA PECTORIS: Primary | ICD-10-CM

## 2024-04-26 DIAGNOSIS — E78.2 MIXED HYPERLIPIDEMIA: ICD-10-CM

## 2024-04-26 DIAGNOSIS — I25.5 ISCHEMIC CARDIOMYOPATHY: ICD-10-CM

## 2024-04-26 PROCEDURE — 1123F ACP DISCUSS/DSCN MKR DOCD: CPT | Performed by: INTERNAL MEDICINE

## 2024-04-26 PROCEDURE — 99214 OFFICE O/P EST MOD 30 MIN: CPT | Performed by: INTERNAL MEDICINE

## 2024-04-26 RX ORDER — CARBIDOPA AND LEVODOPA 50; 200 MG/1; MG/1
1 TABLET, EXTENDED RELEASE ORAL DAILY
COMMUNITY

## 2024-04-26 NOTE — PROGRESS NOTES
[facility]  Cardiology Consult    Leti Clinton  2/19/1934 April 26, 2024    PCP: Dr. COREY Chavez   Former Cardiologist: Dr. Homero Gonzales     Reason for Referral: Hypotension    CC: \"I have been checking my blood pressure.\"      Subjective:     History of Present Illness:    Leti Clinton is a 89 y.o. patient with a PMH significant for Parkinson's, HTN, mechanical AVR 1991 in [facility] on Coumadin (PCP managed), Aspirin, Toprol-xl  therapy.   Today, he is here for follow up. He continues to have orthostatic hypotension. He has been waiting when changing position. He has been having some shortness of breath and then pain behind the knees at times. He has been exercising daily as tolerated. He has had at least 3 episodes when he was unable to speak and had facial drooping with the episode.  Patient denies exertional chest pain/pressure, dyspnea at rest, PND, orthopnea, palpitations, lightheadedness, weight changes, changes in LE edema, and syncope.     Past Medical History:   has a past medical history of Colon polyps, Mechanical heart valve present, and S/P aortic valve replacement with metallic valve.    Surgical History:   has a past surgical history that includes Aortic valve replacement; Colonoscopy (2008); Colonoscopy (08/01/2012); eye surgery (04/2024); Colonoscopy (2018); Diagnostic Cardiac Cath Lab Procedure (05/09/2022); and Cataract removal (2021).     Social History:   reports that he has never smoked. He has never used smokeless tobacco. He reports current alcohol use of about 1.0 standard drink of alcohol per week. He reports that he does not use drugs.     Family History:  family history includes Breast Cancer in his mother and sister; Cancer in his mother; Heart Disease in his mother; Parkinsonism in his father; Prostate Cancer in his maternal grandfather; Stomach Cancer in his paternal grandfather.    Home Medications:  Were reviewed and are listed in nursing record and/or

## 2024-05-14 ENCOUNTER — TELEPHONE (OUTPATIENT)
Dept: FAMILY MEDICINE CLINIC | Age: 89
End: 2024-05-14

## 2024-05-14 DIAGNOSIS — Z79.01 ANTICOAGULATED ON COUMADIN: Primary | ICD-10-CM

## 2024-05-14 NOTE — TELEPHONE ENCOUNTER
----- Message from Tereso Chavez DO sent at 2024  9:01 AM EDT -----  Regarding: FW: Referral Renewal  If you can pend coumadin clinic I would appreciate that.  Thank you for your help.   Dr. Chavez  ----- Message -----  From: Haylee Abad  Sent: 2024   9:16 AM EDT  To: Tereso Chavez DO; Marija Johnson [facility]  Subject: Referral Renewal                                 Hi Dr. Chavez,    The referral for Mr. Leti Clinton for warfarin management has .  Can you please send a new referral?  You can use [id number] to send electronically or please call [phone] if you need a paper referral.    They were previously referred by Dr. Chavez for hx of artificial heart valve replacement with an INR goal of 2.5-3.5 with duration of therapy indefinite.     Thank you,  Haylee Abad [facility]  Anticoagulation Service  [phone]

## 2024-05-21 ENCOUNTER — ANTI-COAG VISIT (OUTPATIENT)
Dept: PHARMACY | Age: 89
End: 2024-05-21
Payer: MEDICARE

## 2024-05-21 DIAGNOSIS — Z95.2 HX OF ARTIFICIAL HEART VALVE REPLACEMENT: ICD-10-CM

## 2024-05-21 DIAGNOSIS — Z79.01 ANTICOAGULATED ON COUMADIN: Primary | ICD-10-CM

## 2024-05-21 LAB — INTERNATIONAL NORMALIZATION RATIO, POC: 2.6

## 2024-05-21 PROCEDURE — 85610 PROTHROMBIN TIME: CPT

## 2024-05-21 PROCEDURE — 99211 OFF/OP EST MAY X REQ PHY/QHP: CPT

## 2024-05-21 NOTE — PROGRESS NOTES
Leti Clinton is a 89 y.o. here for warfarin management.  Leti had an INR test today. Results were reviewed and appropriate warfarin management was completed.     Patient verifies current warfarin dosing regimen: Yes     Warfarin medication reviewed and updated on the patient 's home medication list: Yes   All other medications reviewed and updated on the patient 's home medication list: Yes     Lab Results   Component Value Date    INR 2.6 2024    INR 3.0 2024    INR 2.4 2024       Anticoagulation Summary  As of 2024      INR goal:  2.5-3.5   TTR:  62.1 % (3.4 y)   INR used for dosin.6 (2024)   Warfarin maintenance plan:  5 mg (10 mg x 0.5) every Sun; 10 mg (10 mg x 1) all other days   Weekly warfarin total:  65 mg   Plan last modified:  Ishaan Olsen RPH (3/20/2024)   Next INR check:  2024   Priority:  Maintenance   Target end date:  Indefinite    Indications    Anticoagulated on Coumadin [Z79.01]  Hx of artificial heart valve replacement [Z95.2]                 Anticoagulation Episode Summary       INR check location:      Preferred lab:      Send INR reminders to:  [facility]    Comments:  EPIC  Consent: 1/15/24  son = Ricardo          Anticoagulation Care Providers       Provider Role Specialty Phone number    Tereso Chavez DO Referring Family Medicine [phone]          There were no vitals taken for this visit.    Warfarin assessment / plan:     Patient appears well today.      No changes affecting warfarin therapy were noted.   No acute findings with regards to warfarin therapy.  INR today is within goal range.     Instructed to continue the same weekly warfarin dose.        Description    CONTINUE DOSE: Warfarin 10 mg (one tablet) daily except 5 mg on Sundays  Ensure 1 can daily (adds an extra 1-2 cans per week)     Call [phone] with signs or symptoms of bleeding or ANY medication changes (including over-the-counter medications or

## 2024-05-22 ENCOUNTER — HOSPITAL ENCOUNTER (OUTPATIENT)
Dept: CARDIOLOGY | Age: 89
Discharge: HOME | End: 2024-05-24
Payer: MEDICARE

## 2024-05-22 VITALS
DIASTOLIC BLOOD PRESSURE: 74 MMHG | WEIGHT: 149 LBS | HEIGHT: 70 IN | BODY MASS INDEX: 21.33 KG/M2 | SYSTOLIC BLOOD PRESSURE: 116 MMHG

## 2024-05-22 DIAGNOSIS — I25.5 ISCHEMIC CARDIOMYOPATHY: ICD-10-CM

## 2024-05-22 DIAGNOSIS — I25.10 CORONARY ARTERY DISEASE INVOLVING NATIVE CORONARY ARTERY OF NATIVE HEART WITHOUT ANGINA PECTORIS: ICD-10-CM

## 2024-05-22 LAB
ECHO AO ASC DIAM: 3.5 CM
ECHO AO ASCENDING AORTA INDEX: 1.9 CM/M2
ECHO AO ROOT DIAM: 2.3 CM
ECHO AO ROOT INDEX: 1.25 CM/M2
ECHO AV AREA PEAK VELOCITY: 1.1 CM2
ECHO AV AREA VTI: 1.1 CM2
ECHO AV AREA/BSA PEAK VELOCITY: 0.6 CM2/M2
ECHO AV AREA/BSA VTI: 0.6 CM2/M2
ECHO AV CUSP MM: 1.2 CM
ECHO AV MEAN GRADIENT: 15 MMHG
ECHO AV MEAN VELOCITY: 1.8 M/S
ECHO AV PEAK GRADIENT: 28 MMHG
ECHO AV PEAK VELOCITY: 2.6 M/S
ECHO AV VELOCITY RATIO: 0.35
ECHO AV VTI: 54.3 CM
ECHO BSA: 1.83 M2
ECHO EST RA PRESSURE: 3 MMHG
ECHO LA AREA 2C: 25.9 CM2
ECHO LA AREA 4C: 14.8 CM2
ECHO LA MAJOR AXIS: 5.2 CM
ECHO LA MINOR AXIS: 6.3 CM
ECHO LA VOL BP: 55 ML (ref 18–58)
ECHO LA VOL MOD A2C: 82 ML (ref 18–58)
ECHO LA VOL MOD A4C: 31 ML (ref 18–58)
ECHO LA VOL/BSA BIPLANE: 30 ML/M2 (ref 16–34)
ECHO LA VOLUME INDEX MOD A2C: 45 ML/M2 (ref 16–34)
ECHO LA VOLUME INDEX MOD A4C: 17 ML/M2 (ref 16–34)
ECHO LV E' LATERAL VELOCITY: 9 CM/S
ECHO LV E' SEPTAL VELOCITY: 7 CM/S
ECHO LV EDV A2C: 72 ML
ECHO LV EDV A4C: 105 ML
ECHO LV EDV INDEX A4C: 57 ML/M2
ECHO LV EDV NDEX A2C: 39 ML/M2
ECHO LV EJECTION FRACTION A2C: 53 %
ECHO LV EJECTION FRACTION A4C: 71 %
ECHO LV EJECTION FRACTION BIPLANE: 64 % (ref 55–100)
ECHO LV ESV A2C: 34 ML
ECHO LV ESV A4C: 31 ML
ECHO LV ESV INDEX A2C: 18 ML/M2
ECHO LV ESV INDEX A4C: 17 ML/M2
ECHO LV FRACTIONAL SHORTENING: 37 % (ref 28–44)
ECHO LV GLOBAL LONGITUDINAL STRAIN (GLS): -15.4 %
ECHO LV GLOBAL LONGITUDINAL STRAIN (GLS): -15.5 %
ECHO LV GLOBAL LONGITUDINAL STRAIN (GLS): -15.6 %
ECHO LV GLOBAL LONGITUDINAL STRAIN (GLS): -15.8 %
ECHO LV INTERNAL DIMENSION DIASTOLE INDEX: 2.5 CM/M2
ECHO LV INTERNAL DIMENSION DIASTOLIC: 4.6 CM (ref 4.2–5.9)
ECHO LV INTERNAL DIMENSION SYSTOLIC INDEX: 1.58 CM/M2
ECHO LV INTERNAL DIMENSION SYSTOLIC: 2.9 CM
ECHO LV IVSD: 1.1 CM (ref 0.6–1)
ECHO LV MASS 2D: 181.2 G (ref 88–224)
ECHO LV MASS INDEX 2D: 98.5 G/M2 (ref 49–115)
ECHO LV POSTERIOR WALL DIASTOLIC: 1.1 CM (ref 0.6–1)
ECHO LV RELATIVE WALL THICKNESS RATIO: 0.48
ECHO LVOT AREA: 3.1 CM2
ECHO LVOT AV VTI INDEX: 0.36
ECHO LVOT DIAM: 2 CM
ECHO LVOT MEAN GRADIENT: 2 MMHG
ECHO LVOT PEAK GRADIENT: 3 MMHG
ECHO LVOT PEAK VELOCITY: 0.9 M/S
ECHO LVOT STROKE VOLUME INDEX: 33.1 ML/M2
ECHO LVOT SV: 60.9 ML
ECHO LVOT VTI: 19.4 CM
ECHO MV A VELOCITY: 1.22 M/S
ECHO MV AREA VTI: 1.4 CM2
ECHO MV E DECELERATION TIME (DT): 200 MS
ECHO MV E VELOCITY: 0.89 M/S
ECHO MV E/A RATIO: 0.73
ECHO MV E/E' LATERAL: 9.89
ECHO MV E/E' RATIO (AVERAGED): 11.3
ECHO MV E/E' SEPTAL: 12.71
ECHO MV LVOT VTI INDEX: 2.19
ECHO MV MAX VELOCITY: 1.6 M/S
ECHO MV MEAN GRADIENT: 4 MMHG
ECHO MV MEAN VELOCITY: 1 M/S
ECHO MV PEAK GRADIENT: 10 MMHG
ECHO MV VTI: 42.4 CM
ECHO PV MAX VELOCITY: 1 M/S
ECHO PV MEAN GRADIENT: 2 MMHG
ECHO PV MEAN VELOCITY: 0.7 M/S
ECHO PV PEAK GRADIENT: 4 MMHG
ECHO PV VTI: 19 CM
ECHO RA AREA 4C: 15.3 CM2
ECHO RA END SYSTOLIC VOLUME APICAL 4 CHAMBER INDEX BSA: 21 ML/M2
ECHO RA VOLUME: 39 ML
ECHO RIGHT VENTRICULAR SYSTOLIC PRESSURE (RVSP): 33 MMHG
ECHO RV INTERNAL DIMENSION: 2.9 CM
ECHO RV TAPSE: 2 CM (ref 1.7–?)
ECHO TV REGURGITANT MAX VELOCITY: 2.73 M/S
ECHO TV REGURGITANT PEAK GRADIENT: 30 MMHG

## 2024-05-22 PROCEDURE — 93306 TTE W/DOPPLER COMPLETE: CPT

## 2024-05-28 ENCOUNTER — OFFICE VISIT (OUTPATIENT)
Dept: FAMILY MEDICINE CLINIC | Age: 89
End: 2024-05-28
Payer: MEDICARE

## 2024-05-28 VITALS
HEIGHT: 70 IN | BODY MASS INDEX: 21.19 KG/M2 | WEIGHT: 148 LBS | DIASTOLIC BLOOD PRESSURE: 78 MMHG | SYSTOLIC BLOOD PRESSURE: 112 MMHG

## 2024-05-28 DIAGNOSIS — I50.22 CHRONIC SYSTOLIC (CONGESTIVE) HEART FAILURE (HCC): ICD-10-CM

## 2024-05-28 DIAGNOSIS — G20.A2 PARKINSON'S DISEASE WITH FLUCTUATING MANIFESTATIONS, UNSPECIFIED WHETHER DYSKINESIA PRESENT (HCC): Primary | ICD-10-CM

## 2024-05-28 DIAGNOSIS — Z95.2 HX OF ARTIFICIAL HEART VALVE REPLACEMENT: ICD-10-CM

## 2024-05-28 PROCEDURE — 1123F ACP DISCUSS/DSCN MKR DOCD: CPT | Performed by: FAMILY MEDICINE

## 2024-05-28 PROCEDURE — G2211 COMPLEX E/M VISIT ADD ON: HCPCS | Performed by: FAMILY MEDICINE

## 2024-05-28 PROCEDURE — 99214 OFFICE O/P EST MOD 30 MIN: CPT | Performed by: FAMILY MEDICINE

## 2024-05-28 SDOH — ECONOMIC STABILITY: INCOME INSECURITY: HOW HARD IS IT FOR YOU TO PAY FOR THE VERY BASICS LIKE FOOD, HOUSING, MEDICAL CARE, AND HEATING?: NOT HARD AT ALL

## 2024-05-28 SDOH — ECONOMIC STABILITY: FOOD INSECURITY: WITHIN THE PAST 12 MONTHS, YOU WORRIED THAT YOUR FOOD WOULD RUN OUT BEFORE YOU GOT MONEY TO BUY MORE.: NEVER TRUE

## 2024-05-28 SDOH — ECONOMIC STABILITY: FOOD INSECURITY: WITHIN THE PAST 12 MONTHS, THE FOOD YOU BOUGHT JUST DIDN'T LAST AND YOU DIDN'T HAVE MONEY TO GET MORE.: NEVER TRUE

## 2024-05-28 SDOH — ECONOMIC STABILITY: HOUSING INSECURITY
IN THE LAST 12 MONTHS, WAS THERE A TIME WHEN YOU DID NOT HAVE A STEADY PLACE TO SLEEP OR SLEPT IN A SHELTER (INCLUDING NOW)?: NO

## 2024-05-28 ASSESSMENT — PATIENT HEALTH QUESTIONNAIRE - PHQ9
SUM OF ALL RESPONSES TO PHQ QUESTIONS 1-9: 0
SUM OF ALL RESPONSES TO PHQ QUESTIONS 1-9: 0
2. FEELING DOWN, DEPRESSED OR HOPELESS: NOT AT ALL
SUM OF ALL RESPONSES TO PHQ9 QUESTIONS 1 & 2: 0
1. LITTLE INTEREST OR PLEASURE IN DOING THINGS: NOT AT ALL
SUM OF ALL RESPONSES TO PHQ QUESTIONS 1-9: 0
SUM OF ALL RESPONSES TO PHQ QUESTIONS 1-9: 0

## 2024-05-28 NOTE — PROGRESS NOTES
Take two tablets PO four times a day  Patient taking differently: 1 tablet 3 x a day  Tereso Chavez DO   warfarin (COUMADIN) 10 MG tablet TAKE 1 TABLET DAILY EXCEPT 5 MG EVERY MONDAY AND FRIDAY OR AS DIRECTED BY [facility] [phone]  Patient taking differently: Take 1 tablet by mouth daily EXCEPT 5 mg every Sunday  or as directed by [facility] [phone]  Tereso Chavez DO   selegiline (ELDEPRYL) 5 MG capsule Take 1 capsule by mouth 2 times daily  Jospeh Rivera MD   aspirin 81 MG tablet Take 1 tablet by mouth daily  ProviderJoseph MD        Social History     Tobacco Use    Smoking status: Never    Smokeless tobacco: Never   Substance Use Topics    Alcohol use: Yes     Alcohol/week: 1.0 standard drink of alcohol     Types: 1 Glasses of wine per week     Comment: wine weekly        Vitals:    05/28/24 0825   BP: 112/78   Weight: 67.1 kg (148 lb)   Height: 1.778 m (5' 10\")     Estimated body mass index is 21.24 kg/m² as calculated from the following:    Height as of this encounter: 1.778 m (5' 10\").    Weight as of this encounter: 67.1 kg (148 lb).    Physical Exam  Vitals and nursing note reviewed.   Constitutional:       Appearance: He is well-developed.   HENT:      Head: Normocephalic and atraumatic.      Right Ear: External ear normal.      Left Ear: External ear normal.   Eyes:      Pupils: Pupils are equal, round, and reactive to light.   Neck:      Thyroid: No thyromegaly.   Cardiovascular:      Rate and Rhythm: Normal rate and regular rhythm.      Heart sounds: No murmur heard.  Pulmonary:      Effort: Pulmonary effort is normal.      Breath sounds: Normal breath sounds. No wheezing or rales.   Abdominal:      General: Bowel sounds are normal.      Palpations: Abdomen is soft.      Tenderness: There is no abdominal tenderness.   Musculoskeletal:      Cervical back: Neck supple.   Lymphadenopathy:      Cervical: No cervical adenopathy.   Skin:     Findings: No

## 2024-06-05 ASSESSMENT — ENCOUNTER SYMPTOMS
COUGH: 0
ABDOMINAL PAIN: 0
BACK PAIN: 1
DIARRHEA: 0
NAUSEA: 0
TROUBLE SWALLOWING: 0
VOMITING: 0
SINUS PRESSURE: 0
SORE THROAT: 0
SHORTNESS OF BREATH: 0
WHEEZING: 0
RHINORRHEA: 0
CHEST TIGHTNESS: 0

## 2024-06-07 ENCOUNTER — TELEPHONE (OUTPATIENT)
Dept: FAMILY MEDICINE CLINIC | Age: 89
End: 2024-06-07

## 2024-06-19 ENCOUNTER — ANTI-COAG VISIT (OUTPATIENT)
Dept: PHARMACY | Age: 89
End: 2024-06-19
Payer: MEDICARE

## 2024-06-19 DIAGNOSIS — Z95.2 HX OF ARTIFICIAL HEART VALVE REPLACEMENT: ICD-10-CM

## 2024-06-19 DIAGNOSIS — Z79.01 ANTICOAGULATED ON COUMADIN: Primary | ICD-10-CM

## 2024-06-19 LAB — INR BLD: 2.5

## 2024-06-19 PROCEDURE — 99212 OFFICE O/P EST SF 10 MIN: CPT

## 2024-06-19 PROCEDURE — 85610 PROTHROMBIN TIME: CPT

## 2024-06-19 NOTE — PROGRESS NOTES
caregiver.    Billing Points:  BASIC ASSESSMENT UNCOMPLICATED (including but not limited to: vital signs; physical assessment screening; review of secondary markers like lab results, allergies, home readings; instructions on treatment plan and basic education; assessment of medication list with one med change and compliance) - 2 points   Basic Education -  1 point    For Pharmacy Admin Tracking Only    Intervention Detail: Adherence Monitorin  Total # of Interventions Recommended: 0  Total # of Interventions Accepted: 0  Time Spent (min): 20    Yaw Garcia PharmD Candidate      I have seen the patient and reviewed the progress note written by the PharmD Candidate. I agree with this assesment and plan.   Jena Storm [facility], PharmD 24 8:39 AM

## 2024-07-16 ENCOUNTER — ANTI-COAG VISIT (OUTPATIENT)
Dept: PHARMACY | Age: 89
End: 2024-07-16
Payer: MEDICARE

## 2024-07-16 DIAGNOSIS — Z79.01 ANTICOAGULATED ON COUMADIN: Primary | ICD-10-CM

## 2024-07-16 DIAGNOSIS — Z95.2 HX OF ARTIFICIAL HEART VALVE REPLACEMENT: ICD-10-CM

## 2024-07-16 LAB
INTERNATIONAL NORMALIZATION RATIO, POC: 2.7
PROTHROMBIN TIME, POC: NORMAL

## 2024-07-16 PROCEDURE — 99211 OFF/OP EST MAY X REQ PHY/QHP: CPT

## 2024-07-16 PROCEDURE — 85610 PROTHROMBIN TIME: CPT

## 2024-07-16 NOTE — PROGRESS NOTES
Leti Clinton is a 89 y.o. here for warfarin management.  Leti had an INR test today. Results were reviewed and appropriate warfarin management was completed.     Patient verifies current warfarin dosing regimen: Yes     Warfarin medication reviewed and updated on the patient 's home medication list: Yes   All other medications reviewed and updated on the patient 's home medication list: Yes     Lab Results   Component Value Date    INR 2.7 2024    INR 2.50 2024    INR 2.6 2024     Patient Findings       Negatives:  Signs/symptoms of bleeding, Change in health, Missed doses, Change in medications, Change in diet/appetite, Bruising, Other complaints          Anticoagulation Summary  As of 2024      INR goal:  2.5-3.5   TTR:  63.8 % (3.5 y)   INR used for dosin.7 (2024)   Warfarin maintenance plan:  5 mg (10 mg x 0.5) every Sun; 10 mg (10 mg x 1) all other days   Weekly warfarin total:  65 mg   Plan last modified:  Ishaan Olsen RPH (3/20/2024)   Next INR check:  2024   Priority:  Maintenance   Target end date:  Indefinite    Indications    Anticoagulated on Coumadin [Z79.01]  Hx of artificial heart valve replacement [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    Warfarin assessment / plan:     Patient appears well today.      No changes affecting warfarin therapy were noted.   No acute findings with regards to warfarin therapy.  INR today is within goal range.     Instructed to continue the same weekly warfarin dose.        Description    CONTINUE DOSE: Warfarin 10 mg (one tablet) daily except 5 mg on

## 2024-08-20 ENCOUNTER — ANTI-COAG VISIT (OUTPATIENT)
Dept: PHARMACY | Age: 89
End: 2024-08-20
Payer: MEDICARE

## 2024-08-20 DIAGNOSIS — Z79.01 ANTICOAGULATED ON COUMADIN: Primary | ICD-10-CM

## 2024-08-20 DIAGNOSIS — Z95.2 HX OF ARTIFICIAL HEART VALVE REPLACEMENT: ICD-10-CM

## 2024-08-20 LAB
INTERNATIONAL NORMALIZATION RATIO, POC: 2.1
PROTHROMBIN TIME, POC: 0

## 2024-08-20 PROCEDURE — 99212 OFFICE O/P EST SF 10 MIN: CPT

## 2024-08-20 PROCEDURE — 85610 PROTHROMBIN TIME: CPT

## 2024-08-20 NOTE — PROGRESS NOTES
Leti Clinton is a 89 y.o. here for warfarin management.  Leti had an INR test today. Results were reviewed and appropriate warfarin management was completed.     Patient verifies current warfarin dosing regimen: Yes     Warfarin medication reviewed and updated on the patient 's home medication list: Yes   All other medications reviewed and updated on the patient 's home medication list: No: no changes     Lab Results   Component Value Date    INR 2.1 2024    INR 2.7 2024    INR 2.50 2024     Patient Findings       Positives:  Signs/symptoms of bleeding    Negatives:  Signs/symptoms of thrombosis, Missed doses, Change in medications, Change in diet/appetite, Bruising    Comments:  Nosebleed last week that last 10-12 minutes \"seeping, not gushing\"          Anticoagulation Summary  As of 2024      INR goal:  2.5-3.5   TTR:  63.0% (3.6 y)   INR used for dosin.1 (2024)   Warfarin maintenance plan:  5 mg (10 mg x 0.5) every Sun; 10 mg (10 mg x 1) all other days   Weekly warfarin total:  65 mg   Plan last modified:  Ishaan Olsen RPH (3/20/2024)   Next INR check:  2024   Priority:  Maintenance   Target end date:  Indefinite    Indications    Anticoagulated on Coumadin [Z79.01]  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    Warfarin assessment / plan:     Appears well  Sub-therapeutic INR     Denies missed doses.  Denies increased vitamin K intake.  Denies medication changes.  Denies signs or symptoms of clotting.  Denies signs or symptoms of a stroke     INR has been

## 2024-09-19 ENCOUNTER — ANTI-COAG VISIT (OUTPATIENT)
Dept: PHARMACY | Age: 89
End: 2024-09-19
Payer: MEDICARE

## 2024-09-19 DIAGNOSIS — Z95.2 HX OF ARTIFICIAL HEART VALVE REPLACEMENT: ICD-10-CM

## 2024-09-19 DIAGNOSIS — Z79.01 ANTICOAGULATED ON COUMADIN: Primary | ICD-10-CM

## 2024-09-19 LAB
INTERNATIONAL NORMALIZATION RATIO, POC: 2.4
PROTHROMBIN TIME, POC: 0

## 2024-09-19 PROCEDURE — 85610 PROTHROMBIN TIME: CPT | Performed by: INTERNAL MEDICINE

## 2024-09-19 PROCEDURE — 99211 OFF/OP EST MAY X REQ PHY/QHP: CPT | Performed by: INTERNAL MEDICINE

## 2024-10-14 ENCOUNTER — OFFICE VISIT (OUTPATIENT)
Dept: FAMILY MEDICINE CLINIC | Age: 89
End: 2024-10-14

## 2024-10-14 VITALS
WEIGHT: 145 LBS | HEIGHT: 70 IN | DIASTOLIC BLOOD PRESSURE: 62 MMHG | BODY MASS INDEX: 20.76 KG/M2 | SYSTOLIC BLOOD PRESSURE: 102 MMHG

## 2024-10-14 DIAGNOSIS — G20.A2 PARKINSON'S DISEASE WITH FLUCTUATING MANIFESTATIONS, UNSPECIFIED WHETHER DYSKINESIA PRESENT (HCC): ICD-10-CM

## 2024-10-14 DIAGNOSIS — M54.50 ACUTE RIGHT-SIDED LOW BACK PAIN WITHOUT SCIATICA: ICD-10-CM

## 2024-10-14 DIAGNOSIS — I20.9 ANGINA PECTORIS, UNSPECIFIED (HCC): ICD-10-CM

## 2024-10-14 DIAGNOSIS — I20.9 ANGINA PECTORIS, UNSPECIFIED (HCC): Primary | ICD-10-CM

## 2024-10-14 LAB
ALBUMIN SERPL-MCNC: 4.4 G/DL (ref 3.4–5)
ALBUMIN/GLOB SERPL: 2 {RATIO} (ref 1.1–2.2)
ALP SERPL-CCNC: 48 U/L (ref 40–129)
ALT SERPL-CCNC: 10 U/L (ref 10–40)
ANION GAP SERPL CALCULATED.3IONS-SCNC: 12 MMOL/L (ref 3–16)
AST SERPL-CCNC: 40 U/L (ref 15–37)
BASOPHILS # BLD: 0 K/UL (ref 0–0.2)
BASOPHILS NFR BLD: 0.4 %
BILIRUB SERPL-MCNC: 0.6 MG/DL (ref 0–1)
BUN SERPL-MCNC: 25 MG/DL (ref 7–20)
CALCIUM SERPL-MCNC: 10 MG/DL (ref 8.3–10.6)
CHLORIDE SERPL-SCNC: 102 MMOL/L (ref 99–110)
CO2 SERPL-SCNC: 26 MMOL/L (ref 21–32)
CREAT SERPL-MCNC: 0.8 MG/DL (ref 0.8–1.3)
DEPRECATED RDW RBC AUTO: 15.6 % (ref 12.4–15.4)
EOSINOPHIL # BLD: 0 K/UL (ref 0–0.6)
EOSINOPHIL NFR BLD: 0.3 %
GFR SERPLBLD CREATININE-BSD FMLA CKD-EPI: 84 ML/MIN/{1.73_M2}
GLUCOSE SERPL-MCNC: 82 MG/DL (ref 70–99)
HCT VFR BLD AUTO: 43.1 % (ref 40.5–52.5)
HGB BLD-MCNC: 14.3 G/DL (ref 13.5–17.5)
LYMPHOCYTES # BLD: 1.2 K/UL (ref 1–5.1)
LYMPHOCYTES NFR BLD: 18.3 %
MCH RBC QN AUTO: 30.8 PG (ref 26–34)
MCHC RBC AUTO-ENTMCNC: 33.3 G/DL (ref 31–36)
MCV RBC AUTO: 92.7 FL (ref 80–100)
MONOCYTES # BLD: 0.6 K/UL (ref 0–1.3)
MONOCYTES NFR BLD: 9.7 %
NEUTROPHILS # BLD: 4.6 K/UL (ref 1.7–7.7)
NEUTROPHILS NFR BLD: 71.3 %
PLATELET # BLD AUTO: 261 K/UL (ref 135–450)
PMV BLD AUTO: 9.1 FL (ref 5–10.5)
POTASSIUM SERPL-SCNC: 4.2 MMOL/L (ref 3.5–5.1)
PROT SERPL-MCNC: 6.6 G/DL (ref 6.4–8.2)
RBC # BLD AUTO: 4.65 M/UL (ref 4.2–5.9)
SODIUM SERPL-SCNC: 140 MMOL/L (ref 136–145)
WBC # BLD AUTO: 6.4 K/UL (ref 4–11)

## 2024-10-14 ASSESSMENT — ENCOUNTER SYMPTOMS
ABDOMINAL PAIN: 0
CHEST TIGHTNESS: 0
NAUSEA: 0
COUGH: 0
DIARRHEA: 0
SHORTNESS OF BREATH: 0
WHEEZING: 0
EYE DISCHARGE: 0
VOMITING: 0
BACK PAIN: 1

## 2024-10-14 NOTE — PROGRESS NOTES
10/14/2024     Leti Clinton (:  1934) is a 89 y.o. male, here for evaluation of the following medical concerns:    HPI  Patient is following up concerning chronic medical conditions.         Parkinson's disease- patient has reduced his medication due to BP becoming too low, he was sleeping, tremors are more intense, could be secondary to fall.      Hx of artifical valve replacement- continue on mediation as prescribed, he is following with cardiologist.      Hx of artifical valve replacement- continue on mediation as prescribed, he is following with cardiologist.      Hypotension- patient will stop his Imdur,numbers reduced in AM, will discuss with New.     Lower back pain that has resolved.     I have reviewed the chart notes available from myself and other providers. I have reviewed and addressed all active problems and created or updated the problems list in detail, as needed.      I have extensively reviewed and reconciled the medication list, discontinued medications not taking or no longer appropriate, and updated the active meds list      Today, he denied chest pain, sob, n, v ,or diarrhea.   Review of Systems   Constitutional:  Positive for fatigue. Negative for activity change, fever and unexpected weight change.   Eyes:  Negative for discharge and visual disturbance.   Respiratory:  Negative for cough, chest tightness, shortness of breath and wheezing.    Cardiovascular:  Negative for chest pain, palpitations and leg swelling.   Gastrointestinal:  Negative for abdominal pain, diarrhea, nausea and vomiting.   Musculoskeletal:  Positive for arthralgias and back pain.   Skin:  Negative for rash.   Neurological:  Negative for dizziness, syncope, light-headedness and headaches.   Hematological:  Does not bruise/bleed easily.   Psychiatric/Behavioral:  Negative for dysphoric mood. The patient is not nervous/anxious.        Prior to Visit Medications    Medication Sig Taking? Authorizing Provider

## 2024-10-17 ENCOUNTER — ANTI-COAG VISIT (OUTPATIENT)
Dept: PHARMACY | Age: 89
End: 2024-10-17
Payer: MEDICARE

## 2024-10-17 DIAGNOSIS — Z79.01 ANTICOAGULATED ON COUMADIN: Primary | ICD-10-CM

## 2024-10-17 DIAGNOSIS — Z95.2 HX OF ARTIFICIAL HEART VALVE REPLACEMENT: ICD-10-CM

## 2024-10-17 LAB
INTERNATIONAL NORMALIZATION RATIO, POC: 2.9
PROTHROMBIN TIME, POC: 0

## 2024-10-17 PROCEDURE — 85610 PROTHROMBIN TIME: CPT | Performed by: INTERNAL MEDICINE

## 2024-10-17 PROCEDURE — 99211 OFF/OP EST MAY X REQ PHY/QHP: CPT | Performed by: INTERNAL MEDICINE

## 2024-10-17 NOTE — PROGRESS NOTES
Leti Clinton is a 89 y.o. here for warfarin management.  Leti had an INR test today. Results were reviewed and appropriate warfarin management was completed.     Patient verifies current warfarin dosing regimen: Yes     Warfarin medication reviewed and updated on the patient 's home medication list: Yes   All other medications reviewed and updated on the patient 's home medication list: Yes     Lab Results   Component Value Date    INR 2.9 10/17/2024    INR 2.4 2024    INR 2.1 2024       Anticoagulation Summary  As of 10/17/2024      INR goal:  2.5-3.5   TTR:  62.0% (3.8 y)   INR used for dosin.9 (10/17/2024)   Warfarin maintenance plan:  5 mg (10 mg x 0.5) every Sun; 10 mg (10 mg x 1) all other days   Weekly warfarin total:  65 mg   Plan last modified:  Ishaan Olsen RPH (3/20/2024)   Next INR check:  2024   Priority:  Maintenance   Target end date:  Indefinite    Indications    Anticoagulated on Coumadin [Z79.01]  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    Warfarin assessment / plan:     Patient appears well today.      No changes affecting warfarin therapy were noted.   No acute findings with regards to warfarin therapy.  INR today is within goal range.     Instructed to continue the same weekly warfarin dose.    No changes return to clinic in 4 weeks    Description    CONTINUE DOSE: Warfarin 10 mg (one tablet) daily except 5 mg on Sundays    Ensure 1 can daily (adds an extra 1-2 cans per week)   Eating 4-5 jeniffer salads a week    Call [phone] with signs or symptoms

## 2024-10-24 ENCOUNTER — OFFICE VISIT (OUTPATIENT)
Dept: CARDIOLOGY CLINIC | Age: 89
End: 2024-10-24

## 2024-10-24 VITALS
HEART RATE: 94 BPM | HEIGHT: 70 IN | SYSTOLIC BLOOD PRESSURE: 132 MMHG | OXYGEN SATURATION: 94 % | WEIGHT: 142.4 LBS | BODY MASS INDEX: 20.39 KG/M2 | RESPIRATION RATE: 14 BRPM | DIASTOLIC BLOOD PRESSURE: 70 MMHG

## 2024-10-24 DIAGNOSIS — I95.1 ORTHOSTATIC HYPOTENSION DYSAUTONOMIC SYNDROME: ICD-10-CM

## 2024-10-24 DIAGNOSIS — I25.10 CORONARY ARTERY DISEASE INVOLVING NATIVE CORONARY ARTERY OF NATIVE HEART WITHOUT ANGINA PECTORIS: Primary | ICD-10-CM

## 2024-10-24 DIAGNOSIS — E78.2 MIXED HYPERLIPIDEMIA: ICD-10-CM

## 2024-10-24 DIAGNOSIS — I25.5 ISCHEMIC CARDIOMYOPATHY: ICD-10-CM

## 2024-10-24 NOTE — PROGRESS NOTES
[facility]  Cardiology Consult    Leti Clinton  2/19/1934 October 24, 2024    PCP: Dr. COREY Chavez   Former Cardiologist: Dr. Homero Gonzales     Reason for Referral: Hypotension    CC: \"I am not doing too good.\"      Subjective:     History of Present Illness:    Leti Clinton is a 89 y.o. patient with a PMH significant for Parkinson's, HTN, mechanical AVR 1991 in [facility] on Coumadin (PCP managed), Aspirin, Toprol-xl  therapy.   Today, he is her for follow up. He says that he is not doing too good. He doesn't have much energy. He wakes up in the morning and is dizzy and weak He was started him on a salt tablet. He has been monitoring his blood pressure at home and they are low. Patient denies exertional chest pain/pressure, dyspnea at rest, DALTON, PND, orthopnea, palpitations, weight changes, changes in LE edema, and syncope.     Past Medical History:   has a past medical history of Colon polyps, Mechanical heart valve present, and S/P aortic valve replacement with metallic valve.    Surgical History:   has a past surgical history that includes Aortic valve replacement; Colonoscopy (2008); Colonoscopy (08/01/2012); eye surgery (04/2024); Colonoscopy (2018); Diagnostic Cardiac Cath Lab Procedure (05/09/2022); and Cataract removal (2021).     Social History:   reports that he has never smoked. He has never used smokeless tobacco. He reports current alcohol use of about 1.0 standard drink of alcohol per week. He reports that he does not use drugs.     Family History:  family history includes Breast Cancer in his mother and sister; Cancer in his mother; Heart Disease in his mother; Parkinsonism in his father; Prostate Cancer in his maternal grandfather; Stomach Cancer in his paternal grandfather.    Home Medications:  Were reviewed and are listed in nursing record and/or below  Prior to Admission medications    Medication Sig Start Date End Date Taking? Authorizing Provider

## 2024-11-06 ENCOUNTER — TELEPHONE (OUTPATIENT)
Dept: CARDIOLOGY CLINIC | Age: 89
End: 2024-11-06

## 2024-11-06 RX ORDER — WARFARIN SODIUM 10 MG/1
10 TABLET ORAL DAILY
Qty: 90 TABLET | Refills: 0 | Status: SHIPPED | OUTPATIENT
Start: 2024-11-06

## 2024-11-06 NOTE — TELEPHONE ENCOUNTER
Last office visit 10/14/2024      Next office visit scheduled 1/13/2025    Requested Prescriptions     Pending Prescriptions Disp Refills    warfarin (COUMADIN) 10 MG tablet [Pharmacy Med Name: WARFARIN SODIUM 10 MG TABLET] 90 tablet 2     Sig: TAKE 1 TABLET DAILY EXCEPT 5 MG EVERY MONDAY AND FRIDAY OR AS DIRECTED BY [facility] [phone]

## 2024-11-06 NOTE — TELEPHONE ENCOUNTER
Patient's daughter Marbella called in to report Blood Pressure readings.   (Gave readings, didn't have the dates to match up with them).     AM: 100/61  PM: 144/82    AM: 134/86  PM: 142/86    AM: 136/78  PM: 139/79    AM: 142/76  PM: 139/79    AM: 61/100  PM: 139/79    AM: 118/67  PM: 126/71    States there was 3 other mornings where he woke up and was barley able to walk, but didn't take his BP.     Please advise.     Marbella's callback: [phone]

## 2024-11-07 NOTE — TELEPHONE ENCOUNTER
Called spoke with patient's son Ricardo.  States in morning he will get dizzy and B/P also runs low in morning.  Unsteady on his feet.    States gets shortness of breath when walks a short distance.  States he is taking a salt tablet and tries to get plenty of fluid.    No new medications.  Does try to exercise daily while sitting in a chair.    Last seen in office on 10/24/24.    Please advise.

## 2024-11-08 NOTE — TELEPHONE ENCOUNTER
Called and spoke to Marbella kulkarni let her know that most of the blood pressures are controlled. She said that he bent over this morning and got dizzy and actually had a fall. I encouraged her to call or follow up with the doctor that manages his Parkinsons. She thinks that he has an appointment next week. She verbalized understanding.

## 2024-11-14 ENCOUNTER — ANTI-COAG VISIT (OUTPATIENT)
Dept: PHARMACY | Age: 89
End: 2024-11-14
Payer: MEDICARE

## 2024-11-14 DIAGNOSIS — Z79.01 ANTICOAGULATED ON COUMADIN: Primary | ICD-10-CM

## 2024-11-14 DIAGNOSIS — Z95.2 HX OF ARTIFICIAL HEART VALVE REPLACEMENT: ICD-10-CM

## 2024-11-14 LAB
INTERNATIONAL NORMALIZATION RATIO, POC: 3.2
PROTHROMBIN TIME, POC: 0

## 2024-11-14 PROCEDURE — 85610 PROTHROMBIN TIME: CPT | Performed by: INTERNAL MEDICINE

## 2024-11-14 PROCEDURE — 99211 OFF/OP EST MAY X REQ PHY/QHP: CPT | Performed by: INTERNAL MEDICINE

## 2024-11-14 NOTE — PROGRESS NOTES
Leti Clinton is a 89 y.o. here for warfarin management.  Leti had an INR test today. Results were reviewed and appropriate warfarin management was completed.     Patient verifies current warfarin dosing regimen: Yes     Warfarin medication reviewed and updated on the patient 's home medication list: Yes   All other medications reviewed and updated on the patient 's home medication list: No: no changes     Lab Results   Component Value Date    INR 3.2 11/14/2024    INR 2.9 10/17/2024    INR 2.4 09/19/2024       Anticoagulation Summary  As of 11/14/2024      INR goal:  2.5-3.5   TTR:  62.7% (3.9 y)   INR used for dosing:  3.2 (11/14/2024)   Warfarin maintenance plan:  5 mg (10 mg x 0.5) every Sun; 10 mg (10 mg x 1) all other days   Weekly warfarin total:  65 mg   Plan last modified:  Ishaan Olsen RPH (3/20/2024)   Next INR check:  12/12/2024   Priority:  Maintenance   Target end date:  Indefinite    Indications    Anticoagulated on Coumadin [Z79.01]  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    Warfarin assessment / plan:     Patient appears well today.      No changes affecting warfarin therapy were noted.   No acute findings with regards to warfarin therapy.  INR today is within goal range.     Instructed to continue the same weekly warfarin dose.    Encouraged to keep eating greens so his INR stays in the middle of the range.    Description    CONTINUE DOSE: Warfarin 10 mg (one tablet) daily except 5 mg on Sundays    Ensure 1 can daily (adds an extra 1-2 cans per week)   Eating 4-5 jeniffer salads a

## 2024-11-26 ENCOUNTER — OFFICE VISIT (OUTPATIENT)
Dept: FAMILY MEDICINE CLINIC | Age: 88
End: 2024-11-26

## 2024-11-26 VITALS
DIASTOLIC BLOOD PRESSURE: 78 MMHG | BODY MASS INDEX: 20.62 KG/M2 | WEIGHT: 144 LBS | HEIGHT: 70 IN | SYSTOLIC BLOOD PRESSURE: 118 MMHG

## 2024-11-26 DIAGNOSIS — I50.22 CHRONIC SYSTOLIC (CONGESTIVE) HEART FAILURE (HCC): ICD-10-CM

## 2024-11-26 DIAGNOSIS — G20.A2 PARKINSON'S DISEASE WITHOUT DYSKINESIA, WITH FLUCTUATING MANIFESTATIONS (HCC): Primary | ICD-10-CM

## 2024-11-26 DIAGNOSIS — I25.119 ATHEROSCLEROSIS OF NATIVE CORONARY ARTERY OF NATIVE HEART WITH ANGINA PECTORIS (HCC): ICD-10-CM

## 2024-11-26 RX ORDER — SODIUM CHLORIDE 1 G/1
TABLET ORAL
COMMUNITY

## 2024-11-26 NOTE — PROGRESS NOTES
2024     Leti Clinton (:  1934) is a 89 y.o. male, here for evaluation of the following medical concerns:    HPI  Patient is following up concerning chronic medical conditions. .      Parkinson's disease- patient has reduced his medication due to BP becoming too low, he was sleeping, tremors are more intense, could be secondary to fall.      Hx of artifical valve replacement- continue on mediation as prescribed, he is following with cardiologist.      CHF- patient will stop his Imdur,numbers reduced in AM, will discuss with New.     Today, he denied chest pain, sob, n, v ,or diarrhea.   Review of Systems   Constitutional:  Negative for activity change, fatigue, fever and unexpected weight change.   HENT:  Negative for congestion, ear pain, facial swelling, hearing loss, rhinorrhea, sinus pressure, sore throat and trouble swallowing.    Eyes:  Negative for discharge and visual disturbance.   Respiratory:  Negative for cough, chest tightness, shortness of breath and wheezing.    Cardiovascular:  Negative for chest pain, palpitations and leg swelling.   Gastrointestinal:  Negative for abdominal pain, anal bleeding, blood in stool, diarrhea, nausea and vomiting.   Endocrine: Negative for cold intolerance, heat intolerance, polydipsia and polyphagia.   Genitourinary:  Negative for decreased urine volume, dysuria, frequency, genital sores, penile discharge, penile pain, testicular pain and urgency.   Musculoskeletal:  Negative for arthralgias.   Skin:  Negative for rash.   Allergic/Immunologic: Negative for food allergies.   Neurological:  Negative for dizziness, syncope, light-headedness and headaches.   Hematological:  Does not bruise/bleed easily.   Psychiatric/Behavioral:  Negative for suicidal ideas. The patient is not nervous/anxious.        Prior to Visit Medications    Medication Sig Taking? Authorizing Provider   sodium chloride 1 g tablet START ONCE DAILY, INCREASE IF NEEDED TO TWO TIMES A

## 2024-12-12 ENCOUNTER — ANTI-COAG VISIT (OUTPATIENT)
Dept: PHARMACY | Age: 88
End: 2024-12-12
Payer: MEDICARE

## 2024-12-12 DIAGNOSIS — Z95.2 HX OF ARTIFICIAL HEART VALVE REPLACEMENT: ICD-10-CM

## 2024-12-12 DIAGNOSIS — Z79.01 ANTICOAGULATED ON COUMADIN: Primary | ICD-10-CM

## 2024-12-12 LAB
INTERNATIONAL NORMALIZATION RATIO, POC: 4.9
PROTHROMBIN TIME, POC: 0

## 2024-12-12 PROCEDURE — 99211 OFF/OP EST MAY X REQ PHY/QHP: CPT | Performed by: INTERNAL MEDICINE

## 2024-12-12 PROCEDURE — 85610 PROTHROMBIN TIME: CPT | Performed by: INTERNAL MEDICINE

## 2024-12-12 NOTE — PROGRESS NOTES
0.5mL 2023    Zoster Recombinant (Shingrix) 2019, 2020       Orders Placed This Encounter   Procedures    POCT INR     This external order was created through the results console.      No orders of the defined types were placed in this encounter.     Reviewed AVS with patient / caregiver.    Billing Points:  Adjust dosage and/or reconcile meds (fill pill box) </= 5 medications - 2 points  BASIC ASSESSMENT UNCOMPLICATED (including but not limited to: vital signs; physical assessment screening; review of secondary markers like lab results, allergies, home readings; instructions on treatment plan and basic education; assessment of medication list with one med change and compliance) - 2 points     For Pharmacy Admin Tracking Only    Intervention Detail: Adherence Monitorin and Dose Adjustment: 1, reason: Therapy De-escalation  Total # of Interventions Recommended: 2  Total # of Interventions Accepted: 2  Time Spent (min): 20

## 2024-12-26 ENCOUNTER — ANTI-COAG VISIT (OUTPATIENT)
Dept: PHARMACY | Age: 88
End: 2024-12-26
Payer: MEDICARE

## 2024-12-26 DIAGNOSIS — Z79.01 ANTICOAGULATED ON COUMADIN: Primary | ICD-10-CM

## 2024-12-26 DIAGNOSIS — Z95.2 HX OF ARTIFICIAL HEART VALVE REPLACEMENT: ICD-10-CM

## 2024-12-26 LAB
INR BLD: 2.4
PROTIME: NORMAL

## 2024-12-26 PROCEDURE — 99212 OFFICE O/P EST SF 10 MIN: CPT

## 2024-12-26 PROCEDURE — 85610 PROTHROMBIN TIME: CPT

## 2024-12-26 NOTE — PROGRESS NOTES
Leti Clinton is a 89 y.o. here for warfarin management.  Leti had an INR test today. Results were reviewed and appropriate warfarin management was completed.     Patient verifies current warfarin dosing regimen: Yes     Warfarin medication reviewed and updated on the patient 's home medication list: Yes   All other medications reviewed and updated on the patient 's home medication list: No: no changes    Lab Results   Component Value Date    INR 2.40 2024    INR 4.9 2024    INR 3.2 2024     Patient Findings       Negatives:  Signs/symptoms of bleeding, Change in health, Change in alcohol use, Missed doses, Extra doses, Change in medications, Change in diet/appetite, Bruising          Anticoagulation Summary  As of 2024      INR goal:  2.5-3.5   TTR:  61.6% (4 y)   INR used for dosin.40 (2024)   Warfarin maintenance plan:  5 mg (10 mg x 0.5) every Sun, Wed; 10 mg (10 mg x 1) all other days   Weekly warfarin total:  60 mg   Plan last modified:  Hannah Dc RPH (2024)   Next INR check:  2025   Priority:  Maintenance   Target end date:  Indefinite    Indications    Anticoagulated on Coumadin [Z79.01]  Hx of artificial heart valve replacement [Z95.2]                 Anticoagulation Episode Summary       INR check location:  --    Preferred lab:  --    Send INR reminders to:  [facility]    Comments:  EPIC  Consent: 1/15/24  son = Ricardo          Anticoagulation Care Providers       Provider Role Specialty Phone number    Tereso Chavez DO Referring Family Medicine [phone]          There were no vitals taken for this visit.    Warfarin assessment / plan:     Appears well  Sub-therapeutic INR     Denies missed doses.  Denies increased vitamin K intake.  Denies medication changes.     Since his INR is 2.4 today, advised to take a one time higher warfarin dose of 12.5mg (vs 10mg) Then to resume the regular weekly warfarin dsoe.   Next INR

## 2025-01-22 ENCOUNTER — ANTI-COAG VISIT (OUTPATIENT)
Dept: PHARMACY | Age: 89
End: 2025-01-22
Payer: MEDICARE

## 2025-01-22 DIAGNOSIS — Z95.2 HX OF ARTIFICIAL HEART VALVE REPLACEMENT: ICD-10-CM

## 2025-01-22 DIAGNOSIS — Z79.01 ANTICOAGULATED ON COUMADIN: Primary | ICD-10-CM

## 2025-01-22 LAB
INTERNATIONAL NORMALIZATION RATIO, POC: 4.1
PROTHROMBIN TIME, POC: 0

## 2025-01-22 PROCEDURE — 99211 OFF/OP EST MAY X REQ PHY/QHP: CPT

## 2025-01-22 PROCEDURE — 85610 PROTHROMBIN TIME: CPT

## 2025-01-22 NOTE — PROGRESS NOTES
Leti Clinton is a 89 y.o. here for warfarin management.  Leti had an INR test today. Results were reviewed and appropriate warfarin management was completed.     Patient verifies current warfarin dosing regimen: Yes     Warfarin medication reviewed and updated on the patient 's home medication list: Yes   All other medications reviewed and updated on the patient 's home medication list: No new medications     Lab Results   Component Value Date    INR 4.1 2025    INR 2.40 2024    INR 4.9 2024     Patient Findings       Positives:  Change in diet/appetite, Other complaints    Negatives:  Signs/symptoms of thrombosis, Signs/symptoms of bleeding, Change in health, Missed doses, Change in medications, Bruising    Comments:  Decrease in vitamin K greens and wife in ECF          Anticoagulation Summary  As of 2025      INR goal:  2.5-3.5   TTR:  61.6% (4 y)   INR used for dosin.1 (2025)   Warfarin maintenance plan:  5 mg (10 mg x 0.5) every Sun, Wed; 10 mg (10 mg x 1) all other days   Weekly warfarin total:  60 mg   Plan last modified:  Hannah Dc RPH (2024)   Next INR check:  2025   Priority:  Maintenance   Target end date:  Indefinite    Indications    Anticoagulated on Coumadin [Z79.01]  Hx of artificial heart valve replacement [Z95.2]                 Anticoagulation Episode Summary       INR check location:  --    Preferred lab:  --    Send INR reminders to:  [facility]    Comments:  EPIC  Consent: 1/15/24  son = Ricardo          Anticoagulation Care Providers       Provider Role Specialty Phone number    Tereso Chavez DO Referring Family Medicine [phone]          There were no vitals taken for this visit.    Warfarin assessment / plan:     Appears well  Supra-therapeutic INR.     Denies signs and symptoms of bleeding/bruising.  Denies medication changes.  Denies extra warfarin doses.     Decrease in vitamin K

## 2025-01-24 ENCOUNTER — TELEPHONE (OUTPATIENT)
Dept: CARDIOLOGY CLINIC | Age: 89
End: 2025-01-24

## 2025-01-24 ENCOUNTER — APPOINTMENT (OUTPATIENT)
Dept: GENERAL RADIOLOGY | Age: 89
End: 2025-01-24
Payer: MEDICARE

## 2025-01-24 ENCOUNTER — HOSPITAL ENCOUNTER (OUTPATIENT)
Age: 89
Setting detail: OBSERVATION
Discharge: HOME HEALTH | End: 2025-01-25
Attending: INTERNAL MEDICINE | Admitting: INTERNAL MEDICINE
Payer: MEDICARE

## 2025-01-24 ENCOUNTER — APPOINTMENT (OUTPATIENT)
Dept: CT IMAGING | Age: 89
End: 2025-01-24
Payer: MEDICARE

## 2025-01-24 DIAGNOSIS — R79.89 ELEVATED TROPONIN: ICD-10-CM

## 2025-01-24 DIAGNOSIS — R55 SYNCOPE AND COLLAPSE: Primary | ICD-10-CM

## 2025-01-24 DIAGNOSIS — S61.412A LACERATION OF LEFT HAND WITHOUT FOREIGN BODY, INITIAL ENCOUNTER: ICD-10-CM

## 2025-01-24 PROBLEM — R42 DIZZINESS: Status: ACTIVE | Noted: 2025-01-24

## 2025-01-24 LAB
ALBUMIN SERPL-MCNC: 4.1 G/DL (ref 3.4–5)
ALBUMIN/GLOB SERPL: 1.6 {RATIO} (ref 1.1–2.2)
ALP SERPL-CCNC: 55 U/L (ref 40–129)
ALT SERPL-CCNC: <5 U/L (ref 10–40)
ANION GAP SERPL CALCULATED.3IONS-SCNC: 10 MMOL/L (ref 3–16)
AST SERPL-CCNC: 39 U/L (ref 15–37)
BASOPHILS # BLD: 0 K/UL (ref 0–0.2)
BASOPHILS NFR BLD: 0.5 %
BILIRUB SERPL-MCNC: 0.7 MG/DL (ref 0–1)
BUN SERPL-MCNC: 28 MG/DL (ref 7–20)
CALCIUM SERPL-MCNC: 9.4 MG/DL (ref 8.3–10.6)
CHLORIDE SERPL-SCNC: 104 MMOL/L (ref 99–110)
CO2 SERPL-SCNC: 26 MMOL/L (ref 21–32)
CREAT SERPL-MCNC: 1 MG/DL (ref 0.8–1.3)
DEPRECATED RDW RBC AUTO: 15 % (ref 12.4–15.4)
EKG ATRIAL RATE: 68 BPM
EKG DIAGNOSIS: NORMAL
EKG P AXIS: 82 DEGREES
EKG P-R INTERVAL: 278 MS
EKG Q-T INTERVAL: 460 MS
EKG QRS DURATION: 132 MS
EKG QTC CALCULATION (BAZETT): 489 MS
EKG R AXIS: 49 DEGREES
EKG T AXIS: 75 DEGREES
EKG VENTRICULAR RATE: 68 BPM
EOSINOPHIL # BLD: 0 K/UL (ref 0–0.6)
EOSINOPHIL NFR BLD: 0.2 %
GFR SERPLBLD CREATININE-BSD FMLA CKD-EPI: 71 ML/MIN/{1.73_M2}
GLUCOSE SERPL-MCNC: 86 MG/DL (ref 70–99)
HCT VFR BLD AUTO: 38.3 % (ref 40.5–52.5)
HGB BLD-MCNC: 13 G/DL (ref 13.5–17.5)
INR PPP: 2.79 (ref 0.85–1.15)
LYMPHOCYTES # BLD: 0.9 K/UL (ref 1–5.1)
LYMPHOCYTES NFR BLD: 11.7 %
MCH RBC QN AUTO: 31.3 PG (ref 26–34)
MCHC RBC AUTO-ENTMCNC: 33.8 G/DL (ref 31–36)
MCV RBC AUTO: 92.4 FL (ref 80–100)
MONOCYTES # BLD: 0.9 K/UL (ref 0–1.3)
MONOCYTES NFR BLD: 11.5 %
NEUTROPHILS # BLD: 6.1 K/UL (ref 1.7–7.7)
NEUTROPHILS NFR BLD: 76.1 %
PLATELET # BLD AUTO: 255 K/UL (ref 135–450)
PMV BLD AUTO: 8.8 FL (ref 5–10.5)
POTASSIUM SERPL-SCNC: 4.3 MMOL/L (ref 3.5–5.1)
PROT SERPL-MCNC: 6.7 G/DL (ref 6.4–8.2)
PROTHROMBIN TIME: 29.3 SEC (ref 11.9–14.9)
RBC # BLD AUTO: 4.14 M/UL (ref 4.2–5.9)
SODIUM SERPL-SCNC: 140 MMOL/L (ref 136–145)
TROPONIN, HIGH SENSITIVITY: 24 NG/L (ref 0–22)
TROPONIN, HIGH SENSITIVITY: 28 NG/L (ref 0–22)
WBC # BLD AUTO: 8 K/UL (ref 4–11)

## 2025-01-24 PROCEDURE — 93005 ELECTROCARDIOGRAM TRACING: CPT | Performed by: PHYSICIAN ASSISTANT

## 2025-01-24 PROCEDURE — 73130 X-RAY EXAM OF HAND: CPT

## 2025-01-24 PROCEDURE — 84484 ASSAY OF TROPONIN QUANT: CPT

## 2025-01-24 PROCEDURE — 85610 PROTHROMBIN TIME: CPT

## 2025-01-24 PROCEDURE — 73502 X-RAY EXAM HIP UNI 2-3 VIEWS: CPT

## 2025-01-24 PROCEDURE — 93010 ELECTROCARDIOGRAM REPORT: CPT | Performed by: INTERNAL MEDICINE

## 2025-01-24 PROCEDURE — 80053 COMPREHEN METABOLIC PANEL: CPT

## 2025-01-24 PROCEDURE — 36415 COLL VENOUS BLD VENIPUNCTURE: CPT

## 2025-01-24 PROCEDURE — 6370000000 HC RX 637 (ALT 250 FOR IP): Performed by: INTERNAL MEDICINE

## 2025-01-24 PROCEDURE — 71046 X-RAY EXAM CHEST 2 VIEWS: CPT

## 2025-01-24 PROCEDURE — 2500000003 HC RX 250 WO HCPCS: Performed by: INTERNAL MEDICINE

## 2025-01-24 PROCEDURE — 85025 COMPLETE CBC W/AUTO DIFF WBC: CPT

## 2025-01-24 PROCEDURE — G0378 HOSPITAL OBSERVATION PER HR: HCPCS

## 2025-01-24 PROCEDURE — 99285 EMERGENCY DEPT VISIT HI MDM: CPT

## 2025-01-24 PROCEDURE — 72125 CT NECK SPINE W/O DYE: CPT

## 2025-01-24 PROCEDURE — 70450 CT HEAD/BRAIN W/O DYE: CPT

## 2025-01-24 RX ORDER — ONDANSETRON 4 MG/1
4 TABLET, ORALLY DISINTEGRATING ORAL EVERY 8 HOURS PRN
Status: DISCONTINUED | OUTPATIENT
Start: 2025-01-24 | End: 2025-01-25 | Stop reason: HOSPADM

## 2025-01-24 RX ORDER — CARBIDOPA AND LEVODOPA 25; 100 MG/1; MG/1
1.5 TABLET ORAL 4 TIMES DAILY
Status: DISCONTINUED | OUTPATIENT
Start: 2025-01-24 | End: 2025-01-25 | Stop reason: HOSPADM

## 2025-01-24 RX ORDER — POTASSIUM CHLORIDE 7.45 MG/ML
10 INJECTION INTRAVENOUS PRN
Status: DISCONTINUED | OUTPATIENT
Start: 2025-01-24 | End: 2025-01-25 | Stop reason: HOSPADM

## 2025-01-24 RX ORDER — ASPIRIN 81 MG/1
81 TABLET, CHEWABLE ORAL DAILY
Status: DISCONTINUED | OUTPATIENT
Start: 2025-01-25 | End: 2025-01-25 | Stop reason: HOSPADM

## 2025-01-24 RX ORDER — POTASSIUM CHLORIDE 1500 MG/1
40 TABLET, EXTENDED RELEASE ORAL PRN
Status: DISCONTINUED | OUTPATIENT
Start: 2025-01-24 | End: 2025-01-25 | Stop reason: HOSPADM

## 2025-01-24 RX ORDER — SODIUM CHLORIDE 0.9 % (FLUSH) 0.9 %
5-40 SYRINGE (ML) INJECTION EVERY 12 HOURS SCHEDULED
Status: DISCONTINUED | OUTPATIENT
Start: 2025-01-24 | End: 2025-01-25 | Stop reason: HOSPADM

## 2025-01-24 RX ORDER — SENNOSIDES A AND B 8.6 MG/1
1 TABLET, FILM COATED ORAL DAILY PRN
COMMUNITY

## 2025-01-24 RX ORDER — MAGNESIUM SULFATE IN WATER 40 MG/ML
2000 INJECTION, SOLUTION INTRAVENOUS PRN
Status: DISCONTINUED | OUTPATIENT
Start: 2025-01-24 | End: 2025-01-25 | Stop reason: HOSPADM

## 2025-01-24 RX ORDER — CARBIDOPA AND LEVODOPA 50; 200 MG/1; MG/1
1 TABLET, EXTENDED RELEASE ORAL DAILY
Status: DISCONTINUED | OUTPATIENT
Start: 2025-01-24 | End: 2025-01-25 | Stop reason: HOSPADM

## 2025-01-24 RX ORDER — ACETAMINOPHEN 325 MG/1
650 TABLET ORAL EVERY 6 HOURS PRN
Status: DISCONTINUED | OUTPATIENT
Start: 2025-01-24 | End: 2025-01-25 | Stop reason: HOSPADM

## 2025-01-24 RX ORDER — ACETAMINOPHEN 650 MG/1
650 SUPPOSITORY RECTAL EVERY 6 HOURS PRN
Status: DISCONTINUED | OUTPATIENT
Start: 2025-01-24 | End: 2025-01-25 | Stop reason: HOSPADM

## 2025-01-24 RX ORDER — POLYETHYLENE GLYCOL 3350 17 G/17G
17 POWDER, FOR SOLUTION ORAL DAILY PRN
Status: DISCONTINUED | OUTPATIENT
Start: 2025-01-24 | End: 2025-01-25 | Stop reason: HOSPADM

## 2025-01-24 RX ORDER — SELEGILINE HYDROCHLORIDE 5 MG/1
5 TABLET ORAL 2 TIMES DAILY
Status: DISCONTINUED | OUTPATIENT
Start: 2025-01-24 | End: 2025-01-25 | Stop reason: HOSPADM

## 2025-01-24 RX ORDER — SODIUM CHLORIDE 9 MG/ML
INJECTION, SOLUTION INTRAVENOUS PRN
Status: DISCONTINUED | OUTPATIENT
Start: 2025-01-24 | End: 2025-01-25 | Stop reason: HOSPADM

## 2025-01-24 RX ORDER — ONDANSETRON 2 MG/ML
4 INJECTION INTRAMUSCULAR; INTRAVENOUS EVERY 6 HOURS PRN
Status: DISCONTINUED | OUTPATIENT
Start: 2025-01-24 | End: 2025-01-25 | Stop reason: HOSPADM

## 2025-01-24 RX ORDER — SODIUM CHLORIDE 0.9 % (FLUSH) 0.9 %
5-40 SYRINGE (ML) INJECTION PRN
Status: DISCONTINUED | OUTPATIENT
Start: 2025-01-24 | End: 2025-01-25 | Stop reason: HOSPADM

## 2025-01-24 RX ADMIN — CARBIDOPA AND LEVODOPA 1 TABLET: 50; 200 TABLET, EXTENDED RELEASE ORAL at 22:00

## 2025-01-24 RX ADMIN — SODIUM CHLORIDE, PRESERVATIVE FREE 10 ML: 5 INJECTION INTRAVENOUS at 20:26

## 2025-01-24 RX ADMIN — CARBIDOPA AND LEVODOPA 1.5 TABLET: 25; 100 TABLET ORAL at 20:26

## 2025-01-24 ASSESSMENT — PAIN - FUNCTIONAL ASSESSMENT
PAIN_FUNCTIONAL_ASSESSMENT: 0-10
PAIN_FUNCTIONAL_ASSESSMENT: 0-10

## 2025-01-24 ASSESSMENT — PAIN SCALES - GENERAL
PAINLEVEL_OUTOF10: 0

## 2025-01-24 NOTE — ED PROVIDER NOTES
[facility]  EMERGENCY DEPARTMENT ENCOUNTER        Pt Name: Leti Clinton  MRN: [id number]  Birthdate 2/19/1934  Date of evaluation: 1/24/2025  Provider: Haylee Banda PA-C  PCP: Tereso Chavez DO  Note Started: 5:53 PM EST 1/24/25      JOYCE. I have evaluated this patient.        CHIEF COMPLAINT       Chief Complaint   Patient presents with    Hand Injury    Hip Pain     Pt c/o laceration on left hand with excessive bleeding and left hip pain after a fall yesterday. Reports that he became dizzy and fell while reaching for a med bottle on a shelf. Denies hitting head. Family states that the pt is anticoagulated and his INR was 4 on Wednesday.        HISTORY OF PRESENT ILLNESS: 1 or more Elements             Leti Clinton is a 89 y.o. male who presents to the emergency department with laceration with continued bleeding to his left hand that occurred yesterday.  States he was reaching up to get a medication off the shelf when he became very dizzy and lightheaded and fell to the ground.  States that this is occurred in the past.  He denies hitting his head but does endorse neck pain, left hand pain and left hip pain.  He denies complete LOC.  Unsure why this continues to occur.  He denies recent illness, chest pain, abdominal pain.  Denies any dysuria or hematuria.  He has been taking his warfarin as instructed.    Nursing Notes were all reviewed and agreed with or any disagreements were addressed in the HPI.    REVIEW OF SYSTEMS :      Review of Systems   All other systems reviewed and are negative.      Positives and Pertinent negatives as per HPI.     SURGICAL HISTORY     Past Surgical History:   Procedure Laterality Date    AORTIC VALVE REPLACEMENT      CATARACT REMOVAL  2021    COLONOSCOPY  2008        COLONOSCOPY  08/01/2012    Dr Bales    COLONOSCOPY  2018    several polyps found follow up in three years    DIAGNOSTIC CARDIAC CATH LAB PROCEDURE  05/09/2022    EYE SURGERY

## 2025-01-24 NOTE — CONSULTS
Clinical Pharmacy Note  Warfarin Consult    Leti Clinton is a 89 y.o. male receiving warfarin managed by pharmacy.  Patient being bridged with: none    Warfarin Indication: Mechanical AVR   Target INR range: 2.5-3.5  Dose prior to admission: 5 mg WED/SUN and 10 mg ALL OTHER DAYS    Current warfarin drug-drug interactions: none of clinical significance    Recent Labs     01/22/25  1123 01/24/25  1238   HGB  --  13.0*   HCT  --  38.3*   INR 4.1 2.79*       Assessment/Plan:  INR is therapeutic.    Patient received Warfarin 10 mg prior to arrival to the hospital today.  Will obtain a repeat PT/INR in the AM.    Thank you for the consult.  Will continue to follow.     Uday Dale RPH, PharmD, BCPS  1/24/2025 6:01 PM

## 2025-01-24 NOTE — TELEPHONE ENCOUNTER
Karon(daughter) called and relayed that Leti is admitted and really wants to go home. She stated that his troponin level is 24 and would like to know if there is a medication Dr. Wolff can prescribe so he can go home or if he really recommends he stays admitted?    Please advise.    Karon's callback: [phone]

## 2025-01-24 NOTE — PROGRESS NOTES
4 Eyes Skin Assessment     NAME:  Leti Clinton  YOB: 1934  MEDICAL RECORD NUMBER:  [id number]    The patient is being assessed for  Admission    I agree that at least one RN has performed a thorough Head to Toe Skin Assessment on the patient. ALL assessment sites listed below have been assessed.      Areas assessed by both nurses:    Head, Face, Ears, Shoulders, Back, Chest, Arms, Elbows, Hands, Sacrum. Buttock, Coccyx, Ischium, and Legs. Feet and Heels        Does the Patient have a Wound? No noted wound(s)       Dimitry Prevention initiated by RN: No  Wound Care Orders initiated by RN: No    Pressure Injury (Stage 3,4, Unstageable, DTI, NWPT, and Complex wounds) if present, place Wound referral order by RN under : No    New Ostomies, if present place, Ostomy referral order under : No     Nurse 1 eSignature: Electronically signed by Rosemarie Oliveros RN on 1/24/25 at 5:28 PM EST    **SHARE this note so that the co-signing nurse can place an eSignature**    Nurse 2 eSignature: Electronically signed by LAURA RAMOS RN on 1/24/25 at 6:54 PM EST

## 2025-01-24 NOTE — H&P
Hospital Medicine History & Physical      PCP: Tereso Chavez DO    Date of Admission: 1/24/2025    Date of Service: Pt seen/examined on 01/24/2025 placed in Observation.    Chief Complaint: Syncope      History Of Present Illness:      89 y.o. male who presented to [facility] with please syncopal episode yesterday and fall along with laceration of his left hand, patient stated he felt dizzy and weak suddenly and he felt about to pass out but he did not but he did have a fall he does not remember hitting his head or neck, he did landed on his left side had some hand pain thereafter, no chest pain shortness of breath nausea or vomiting before during or after the episode, presented today to emergency department found to have minimally elevated troponin laceration left hand discussed with ED provider agree with plan to place in observation  Past Medical History:          Diagnosis Date    Colon polyps     Mechanical heart valve present     S/P aortic valve replacement with metallic valve     1991       Past Surgical History:          Procedure Laterality Date    AORTIC VALVE REPLACEMENT      CATARACT REMOVAL  2021    COLONOSCOPY  2008        COLONOSCOPY  08/01/2012    Dr Bales    COLONOSCOPY  2018    several polyps found follow up in three years    DIAGNOSTIC CARDIAC CATH LAB PROCEDURE  05/09/2022    EYE SURGERY  04/2024       Medications Prior to Admission:      Prior to Admission medications    Medication Sig Start Date End Date Taking? Authorizing Provider   sodium chloride 1 g tablet START ONCE DAILY, INCREASE IF NEEDED TO TWO TIMES A DAY    ProviderJoseph MD   warfarin (COUMADIN) 10 MG tablet Take 1 tablet by mouth daily EXCEPT 5 mg every Sunday  or as directed by [facility] [phone] 11/6/24   Tereso Chavez DO   carbidopa-levodopa (SINEMET CR)  MG per extended release tablet Take 1 tablet by mouth daily 1 tablet at night    ProviderJoseph MD

## 2025-01-24 NOTE — PROGRESS NOTES
Pt remains alert and oriented with no acute distress noted. Pain score and pt condition reviewed with receiving RN. All questions answered.

## 2025-01-24 NOTE — TELEPHONE ENCOUNTER
Called and spoke to Karon and let her know that if the emergency room is recommending that he stay that he should stay. She verbalized understanding.

## 2025-01-24 NOTE — ED TRIAGE NOTES
Pt c/o laceration on left hand with excessive bleeding and left hip pain after a fall yesterday. Reports that he became dizzy and fell while reaching for a med bottle on a shelf. Denies hitting head. Family states hx of Parkinsons and that the pt is anticoagulated and his INR was 4 on Wednesday.

## 2025-01-24 NOTE — PROGRESS NOTES
Medication Reconciliation    List of medications patient is currently taking is complete.     Source of information: 1. Conversation with patient and family at bedside                                      2. EPIC records      Allergies  Amlodipine and Codeine     Notes regarding home medications:   1. Patient states he took all meds prior to arrival except carbidopa-levodopa dinner dose, and bedtime carbidopa-levodopa CR dose  2. Added senna   3. Specified dosing times for carbadopa-levodopa      Swapna Couch   1/24/2025  4:26 PM

## 2025-01-25 ENCOUNTER — APPOINTMENT (OUTPATIENT)
Age: 89
End: 2025-01-25
Attending: INTERNAL MEDICINE
Payer: MEDICARE

## 2025-01-25 VITALS
SYSTOLIC BLOOD PRESSURE: 133 MMHG | OXYGEN SATURATION: 98 % | TEMPERATURE: 97.9 F | WEIGHT: 147.05 LBS | HEIGHT: 70 IN | RESPIRATION RATE: 16 BRPM | BODY MASS INDEX: 21.05 KG/M2 | HEART RATE: 71 BPM | DIASTOLIC BLOOD PRESSURE: 68 MMHG

## 2025-01-25 LAB
ALBUMIN SERPL-MCNC: 3.5 G/DL (ref 3.4–5)
ALBUMIN/GLOB SERPL: 1.5 {RATIO} (ref 1.1–2.2)
ALP SERPL-CCNC: 49 U/L (ref 40–129)
ALT SERPL-CCNC: <5 U/L (ref 10–40)
ANION GAP SERPL CALCULATED.3IONS-SCNC: 6 MMOL/L (ref 3–16)
AST SERPL-CCNC: 33 U/L (ref 15–37)
BASOPHILS # BLD: 0 K/UL (ref 0–0.2)
BASOPHILS NFR BLD: 0.5 %
BILIRUB SERPL-MCNC: 0.5 MG/DL (ref 0–1)
BUN SERPL-MCNC: 25 MG/DL (ref 7–20)
CALCIUM SERPL-MCNC: 9.1 MG/DL (ref 8.3–10.6)
CHLORIDE SERPL-SCNC: 105 MMOL/L (ref 99–110)
CO2 SERPL-SCNC: 26 MMOL/L (ref 21–32)
CREAT SERPL-MCNC: 0.8 MG/DL (ref 0.8–1.3)
DEPRECATED RDW RBC AUTO: 14.8 % (ref 12.4–15.4)
EOSINOPHIL # BLD: 0 K/UL (ref 0–0.6)
EOSINOPHIL NFR BLD: 0.4 %
GFR SERPLBLD CREATININE-BSD FMLA CKD-EPI: 84 ML/MIN/{1.73_M2}
GLUCOSE SERPL-MCNC: 90 MG/DL (ref 70–99)
HCT VFR BLD AUTO: 36.6 % (ref 40.5–52.5)
HGB BLD-MCNC: 12.5 G/DL (ref 13.5–17.5)
INR PPP: 3.22 (ref 0.85–1.15)
LYMPHOCYTES # BLD: 1.1 K/UL (ref 1–5.1)
LYMPHOCYTES NFR BLD: 17 %
MCH RBC QN AUTO: 31 PG (ref 26–34)
MCHC RBC AUTO-ENTMCNC: 34 G/DL (ref 31–36)
MCV RBC AUTO: 91.4 FL (ref 80–100)
MONOCYTES # BLD: 0.8 K/UL (ref 0–1.3)
MONOCYTES NFR BLD: 12.1 %
NEUTROPHILS # BLD: 4.6 K/UL (ref 1.7–7.7)
NEUTROPHILS NFR BLD: 70 %
PLATELET # BLD AUTO: 216 K/UL (ref 135–450)
PMV BLD AUTO: 8.7 FL (ref 5–10.5)
POTASSIUM SERPL-SCNC: 4.3 MMOL/L (ref 3.5–5.1)
PROT SERPL-MCNC: 5.8 G/DL (ref 6.4–8.2)
PROTHROMBIN TIME: 32.7 SEC (ref 11.9–14.9)
RBC # BLD AUTO: 4.01 M/UL (ref 4.2–5.9)
SODIUM SERPL-SCNC: 137 MMOL/L (ref 136–145)
WBC # BLD AUTO: 6.6 K/UL (ref 4–11)

## 2025-01-25 PROCEDURE — G0378 HOSPITAL OBSERVATION PER HR: HCPCS

## 2025-01-25 PROCEDURE — 6370000000 HC RX 637 (ALT 250 FOR IP): Performed by: INTERNAL MEDICINE

## 2025-01-25 PROCEDURE — 99222 1ST HOSP IP/OBS MODERATE 55: CPT | Performed by: INTERNAL MEDICINE

## 2025-01-25 PROCEDURE — 97166 OT EVAL MOD COMPLEX 45 MIN: CPT

## 2025-01-25 PROCEDURE — 97530 THERAPEUTIC ACTIVITIES: CPT

## 2025-01-25 PROCEDURE — 94760 N-INVAS EAR/PLS OXIMETRY 1: CPT

## 2025-01-25 PROCEDURE — 85610 PROTHROMBIN TIME: CPT

## 2025-01-25 PROCEDURE — 80053 COMPREHEN METABOLIC PANEL: CPT

## 2025-01-25 PROCEDURE — 97162 PT EVAL MOD COMPLEX 30 MIN: CPT

## 2025-01-25 PROCEDURE — 97116 GAIT TRAINING THERAPY: CPT

## 2025-01-25 PROCEDURE — 36415 COLL VENOUS BLD VENIPUNCTURE: CPT

## 2025-01-25 PROCEDURE — 85025 COMPLETE CBC W/AUTO DIFF WBC: CPT

## 2025-01-25 PROCEDURE — 2500000003 HC RX 250 WO HCPCS: Performed by: INTERNAL MEDICINE

## 2025-01-25 RX ORDER — WARFARIN SODIUM 5 MG/1
10 TABLET ORAL
Status: COMPLETED | OUTPATIENT
Start: 2025-01-25 | End: 2025-01-25

## 2025-01-25 RX ADMIN — ASPIRIN 81 MG: 81 TABLET, CHEWABLE ORAL at 08:33

## 2025-01-25 RX ADMIN — CARBIDOPA AND LEVODOPA 1.5 TABLET: 25; 100 TABLET ORAL at 08:33

## 2025-01-25 RX ADMIN — WARFARIN SODIUM 10 MG: 5 TABLET ORAL at 08:33

## 2025-01-25 RX ADMIN — CARBIDOPA AND LEVODOPA 1.5 TABLET: 25; 100 TABLET ORAL at 17:07

## 2025-01-25 RX ADMIN — CARBIDOPA AND LEVODOPA 1.5 TABLET: 25; 100 TABLET ORAL at 14:04

## 2025-01-25 RX ADMIN — ACETAMINOPHEN 650 MG: 325 TABLET ORAL at 11:30

## 2025-01-25 RX ADMIN — SODIUM CHLORIDE, PRESERVATIVE FREE 10 ML: 5 INJECTION INTRAVENOUS at 10:17

## 2025-01-25 ASSESSMENT — PAIN DESCRIPTION - LOCATION: LOCATION: HAND

## 2025-01-25 ASSESSMENT — PAIN DESCRIPTION - ORIENTATION: ORIENTATION: LEFT

## 2025-01-25 ASSESSMENT — PAIN DESCRIPTION - FREQUENCY: FREQUENCY: CONTINUOUS

## 2025-01-25 ASSESSMENT — PAIN DESCRIPTION - DESCRIPTORS: DESCRIPTORS: BURNING

## 2025-01-25 ASSESSMENT — PAIN SCALES - GENERAL: PAINLEVEL_OUTOF10: 4

## 2025-01-25 ASSESSMENT — PAIN DESCRIPTION - PAIN TYPE: TYPE: ACUTE PAIN

## 2025-01-25 ASSESSMENT — PAIN DESCRIPTION - ONSET: ONSET: ON-GOING

## 2025-01-25 NOTE — PROGRESS NOTES
Pt discharged home with family and Home healthcare.  Iv and tele removed.  Skin tear dressing changed. Discharge instructions given and reviewed with pt and daughter.  Daughter here to transport pt home.

## 2025-01-25 NOTE — PROGRESS NOTES
Clinical Pharmacy Note  Warfarin Consult    Leti Clinton is a 89 y.o. male receiving warfarin managed by pharmacy.      Warfarin Indication: Mechanical AVR   Target INR range: 2.5-3.5  Dose prior to admission: 5 mg WED/SUN and 10 mg ALL OTHER DAYS    Current warfarin drug-drug interactions: none of clinical significance    Recent Labs     01/22/25  1123 01/24/25  1238 01/25/25  0602   HGB  --  13.0* 12.5*   HCT  --  38.3* 36.6*   INR 4.1 2.79* 3.22*       Assessment/Plan:  INR remains therapeutic today.    Will continue patient's home regimen - warfarin 10 mg x 1 today. Will give dose this morning to keep consistent with patient's outpatient routine.  Continue daily PT/INR.    Thank you for the consult.  Will continue to follow.     Sandra Nuñez RPH, PharmD  1/25/2025 8:21 AM

## 2025-01-25 NOTE — DISCHARGE INSTR - COC
Continuity of Care Form    Patient Name: Leti Clinton   :  1934  MRN:  [id number]    Admit date:  2025  Discharge date:  2025    Code Status Order: Full Code   Advance Directives:   Advance Care Flowsheet Documentation             Admitting Physician:  Emmanuel Kellogg MD  PCP: Tereso Chavez DO    Discharging Nurse: Alissa Randle  Discharging Hospital Unit/Room#: N8D-1642/4272-01  Discharging Unit Phone Number: [phone]    Emergency Contact:   Extended Emergency Contact Information  Primary Emergency Contact: ClintonRicardo  Address: [address]  Home Phone: [phone]  Relation: Child   needed? No  Secondary Emergency Contact: Getachew Clinton  Address: [address] [place] [address] [address]           [place] [address] [facility]  Home Phone: [phone]  Mobile Phone: [phone]  Relation: Spouse   needed? No    Past Surgical History:  Past Surgical History:   Procedure Laterality Date    AORTIC VALVE REPLACEMENT      CATARACT REMOVAL      COLONOSCOPY          COLONOSCOPY  2012    Dr Bales    COLONOSCOPY      several polyps found follow up in three years    DIAGNOSTIC CARDIAC CATH LAB PROCEDURE  2022    EYE SURGERY  2024       Immunization History:   Immunization History   Administered Date(s) Administered    COVID-19, PFIZER Bivalent, DO NOT Dilute, (age 12y+), IM, 30 mcg/0.3 mL 10/13/2022    COVID-19, PFIZER PURPLE top, DILUTE for use, (age 12 y+), 30mcg/0.3mL 2021, 02/10/2021, 10/06/2021    COVID-19, PFIZER, (age 12y+), IM, 30mcg/0.3mL 2025    Influenza, AFLURIA (age 3 y+), FLUZONE, (age 6 mo+), Quadv MDV, 0.5mL 2017    Influenza, FLUAD, (age 65 y+), IM, Quadv, 0.5mL 10/06/2021, 10/13/2022, 10/12/2023    Influenza, FLUAD, (age 65 y+), IM, Trivalent PF, 0.5mL 10/14/2024    Influenza, FLUARIX, FLULAVAL, FLUZONE (age 6 mo+) and AFLURIA,

## 2025-01-25 NOTE — CONSULTS
Neurosurgery Consult Note    89 y/o woman with neck pain after fall. Found to have C7 spinous process fracture on CT. Neuro intact per report. Fracture is stable without risk of neurologic injury. Soft collar for comfort. No activity restrictions. No follow up needed.    Lucio Westbrook MD  Neurosurgery  [facility]  [phone]

## 2025-01-25 NOTE — PROGRESS NOTES
Occupational Therapy  Facility/Department: [address] MED SURG  Occupational Therapy Initial Assessment    Name: Leti Clinton  : 1934  MRN: [id number]  Date of Service: 2025    Discharge Recommendations:  Home with assist PRN  OT Equipment Recommendations  Equipment Needed: No  Other: pt denied needs for DME  Leti Clinton scored a 20/24 on the AM-PAC ADL Inpatient form.  At this time, no further OT is recommended upon discharge.  Recommend patient returns to prior setting with prior services.        Patient Diagnosis(es): The primary encounter diagnosis was Syncope and collapse. Diagnoses of Elevated troponin and Laceration of left hand without foreign body, initial encounter were also pertinent to this visit.  Past Medical History:  has a past medical history of Colon polyps, Mechanical heart valve present, and S/P aortic valve replacement with metallic valve.  Past Surgical History:  has a past surgical history that includes Aortic valve replacement; Colonoscopy (); Colonoscopy (2012); eye surgery (2024); Colonoscopy (); Diagnostic Cardiac Cath Lab Procedure (2022); and Cataract removal ().    Treatment Diagnosis: decreased fxl transfers/mob in preparation for ADLs      Assessment  Performance deficits / Impairments: Decreased functional mobility ;Decreased ADL status;Decreased safe awareness;Decreased balance  Assessment: Pt is a 89 y.o. M admitted for dizxiness s/p fall at home. Per neurosurgery note \"Soft collar for comfort. No activity restrictions. No follow up needed.\". At baseline, pt lives at home w/ his wife where he is Ind w/ fxl mob and self care. Dtr visits daily. Today- he is functioning just below/near his baseline requiring supervision for multiple fxl transfers and SBA w/ RW for greater than household distances. He declined ADLs this date. Pt is anticipated to require up to SBA for full ADLs. No c/o of fatigue or dizziness. Pt reported he does not always use

## 2025-01-25 NOTE — DISCHARGE SUMMARY
Hospital Medicine Discharge Summary    Patient ID: Leti Clinton      Patient's PCP: Tereso Chavez,     Admit Date: 1/24/2025     Discharge Date:   01/25/2025    Admitting Provider: Emmanuel Kellogg MD     Discharge Provider: Emmanuel Kellogg MD     Discharge Diagnoses:       Active Hospital Problems    Diagnosis     Dizziness [R42]        The patient was seen and examined on day of discharge and this discharge summary is in conjunction with any daily progress note from day of discharge.    Hospital Course:     From HPI:\"89 y.o. male who presented to [facility] with please syncopal episode yesterday and fall along with laceration of his left hand, patient stated he felt dizzy and weak suddenly and he felt about to pass out but he did not but he did have a fall he does not remember hitting his head or neck, he did landed on his left side had some hand pain thereafter, no chest pain shortness of breath nausea or vomiting before during or after the episode, presented today to emergency department found to have minimally elevated troponin laceration left hand discussed with ED provider agree with plan to place in observation \"        Presyncope, with generalized weakness like to mechanical fall and trauma without obvious broken arm or leg CT cervical spine positive for transverse minimally displaced fracture throughout the base of C7 spinous process patient does not have tender point at this time.  Telemetry monitoring, to note also that his troponin is minimally elevated continue to have no symptoms today up to chair, discussed with cardiology Dr. Evans, no need to complete echo as an inpatient follow-up as outpatient with cardiology.  Will discharge  Transverse minimally displaced fractures of the base of C7 spinous process not sure how acute this is as patient does not have tenderness,apparently neurosurgery consulted from ED and recommendation only for soft collar for comfort.  Hand trauma with

## 2025-01-25 NOTE — CONSULTS
Interventional Cardiology Consultation     Leti Clinton  2/19/1934    PCP: Tereso Chavez DO  Referring Physician: Dr. Kellogg   Reason for Referral: syncope  Chief Complaint:   Chief Complaint   Patient presents with    Hand Injury    Hip Pain     Pt c/o laceration on left hand with excessive bleeding and left hip pain after a fall yesterday. Reports that he became dizzy and fell while reaching for a med bottle on a shelf. Denies hitting head. Family states that the pt is anticoagulated and his INR was 4 on Wednesday.        Subjective:     History of Present Illness: The patient is 89 y.o. male with a past medical history significant for Parkinson disease, hypertension mechanical AVR, who presents above complaint.  He mitts to reaching for his medicine cabinet he tripped and fell and cut his hand the bleeding was significant so he came to the emergency room for further workup.  He has no complaints denies chest pain PND orthopnea palpitations syncope or edema        Past Medical History:   Diagnosis Date    Colon polyps     Mechanical heart valve present     S/P aortic valve replacement with metallic valve     1991     Past Surgical History:   Procedure Laterality Date    AORTIC VALVE REPLACEMENT      CATARACT REMOVAL  2021    COLONOSCOPY  2008        COLONOSCOPY  08/01/2012    Dr Bales    COLONOSCOPY  2018    several polyps found follow up in three years    DIAGNOSTIC CARDIAC CATH LAB PROCEDURE  05/09/2022    EYE SURGERY  04/2024     Family History   Problem Relation Age of Onset    Heart Disease Mother     Breast Cancer Mother     Cancer Mother     Parkinsonism Father     Breast Cancer Sister     Prostate Cancer Maternal Grandfather     Stomach Cancer Paternal Grandfather      Social History     Tobacco Use    Smoking status: Never    Smokeless tobacco: Never   Substance Use Topics    Alcohol use: Yes     Alcohol/week: 1.0 standard drink of alcohol     Types: 1 Glasses  ejection fraction of 50%. No regional wall motion abnormalities are  noted. Diastolic filling parameters suggest grade I diastolic dysfunction.  A mechanical aortic valve appears well seated with a maximum velocity of  2.67m/s and a mean gradient of 16 mmHg.  Moderate mitral regurgitation is present.  Mild tricuspid regurgitation.  Estimated pulmonary artery systolic pressure is 35 mmHg assuming a right  atrial pressure of 3 mmHg.     ECHO  7/14/2022  Left ventricular cavity size is normal.  Normal left ventricular wall thickness.  Ejection fraction is visually estimated to be 40-45%.  There is mild diffuse hypokinesis.  Grade II diastolic dysfunction with elevated LV filling pressures.  Mitral valve area is calculated at 1.1cm2 consistent with moderate mitral  stenosis.  The peak mitral valve velocity is 1.4m/s and the mean pressure gradient is  3mmHg.  Trivial mitral regurgitation.  A mechanical artificial aortic valve appears well seated with a maximum  velocity of 2.5m/s and a mean gradient of 14mmHg, max gradient of 25mmHg.  Trivial cruz-device leakage noted.  Mildly dilated right ventricle.  Right ventricular systolic function is mildly reduced.     ECHO  7/7/2021  Left ventricular cavity size is normal.  There is asymmetric hypertrophy of the basal septum.  Ejection fraction is visually estimated to be 55-60%.  No regional wall motion abnormalities are noted.  Indeterminate diastolic function.  Normal right ventricular size and function.  Right ventricular systolic function is normal.     ECHO   7/24/2020  Concentric LVH with normal LV size and wall motion. EF is   55%. Grade I  diastolic dysfunction with normal LV filling pressures.  The left atrium is mildly dilated.  A mechanical artificial aortic valve appears well seated with a mean  gradient of 17 mmHg.  The right ventricle is normal in size and function.  A bubble study was performed and shows evidence of right to left shunting           Stress Test:

## 2025-01-25 NOTE — PLAN OF CARE
Problem: Chronic Conditions and Co-morbidities  Goal: Patient's chronic conditions and co-morbidity symptoms are monitored and maintained or improved  1/24/2025 2152 by Bernie Palomino RN  Outcome: Progressing  1/24/2025 1722 by Rosemarie Oliveros RN  Outcome: Progressing     Problem: Discharge Planning  Goal: Discharge to home or other facility with appropriate resources  1/24/2025 2152 by Bernie Palomino RN  Outcome: Progressing  1/24/2025 1722 by Rosemarie Oliveros RN  Outcome: Progressing     Problem: Pain  Goal: Verbalizes/displays adequate comfort level or baseline comfort level  1/24/2025 2152 by Bernie Palomino RN  Outcome: Progressing  1/24/2025 1722 by Rosemarie Oliveros RN  Outcome: Progressing     Problem: Safety - Adult  Goal: Free from fall injury  1/24/2025 2152 by Bernie Palomino RN  Outcome: Progressing  1/24/2025 1722 by Rosemarie Oliveros RN  Outcome: Progressing     Problem: ABCDS Injury Assessment  Goal: Absence of physical injury  1/24/2025 2152 by Bernie Palomino RN  Outcome: Progressing  1/24/2025 1722 by Rosemarie Oliveros RN  Outcome: Progressing

## 2025-01-25 NOTE — CARE COORDINATION
Discharge order noted. Chart reviewed. Plan is to return home with [facility]. Call to [facility] to set up C. No additional discharge needs identified at this time.    Electronically signed by Alissa Mcclain RN MSN on 1/25/2025 at 4:00 PM

## 2025-01-25 NOTE — PROGRESS NOTES
Physical Therapy  Facility/Department: [address] MED SURG  Physical Therapy Initial Assessment    Name: Leti Clinton  : 1934  MRN: [id number]  Date of Service: 2025    Discharge Recommendations:  Home with assist PRN, Home with Home health PT   PT Equipment Recommendations  Equipment Needed: No      Patient Diagnosis(es): The primary encounter diagnosis was Syncope and collapse. Diagnoses of Elevated troponin and Laceration of left hand without foreign body, initial encounter were also pertinent to this visit.  Past Medical History:  has a past medical history of Colon polyps, Mechanical heart valve present, and S/P aortic valve replacement with metallic valve.  Past Surgical History:  has a past surgical history that includes Aortic valve replacement; Colonoscopy (); Colonoscopy (2012); eye surgery (2024); Colonoscopy (); Diagnostic Cardiac Cath Lab Procedure (2022); and Cataract removal ().    Assessment  Assessment: Leti is a 89 y.o. M who came to [facility] ED status post fall. Pt ambulated 200' steadily in the hallway with a walker and SBA. He appears to be at his baseline level and appropriate for return home to [facility] with his wife. Pt politely declines hmoe PT services; he was receptive to my instructions to ambulate with his walker at all times for the next several weeks. Will follow here in the hospital  Treatment Diagnosis: Decreased activity tolerance  Therapy Prognosis: Good  Decision Making: Medium Complexity  History: See chart  Exam: See above  Clinical Presentation: Evolving activity tolerance  Barriers to Learning: None  Requires PT Follow-Up: Yes  Activity Tolerance  Activity Tolerance: Patient tolerated treatment well    Plan  Physical Therapy Plan  General Plan: 3-5 times per week  Current Treatment Recommendations: Functional mobility training  Safety Devices  Type of Devices: All fall risk precautions in place, Call light within reach, Chair alarm in place,  much help is needed turning from your back to your side while in a flat bed without using bedrails?: None  How much help is needed moving from lying on your back to sitting on the side of a flat bed without using bedrails?: None  How much help is needed moving to and from a bed to a chair?: None  How much help is needed standing up from a chair using your arms?: None  How much help is needed walking in hospital room?: A Little  How much help is needed climbing 3-5 steps with a railing?: A Little  AM-[facility] Inpatient Mobility Raw Score : 22  AM-PAC Inpatient T-Scale Score : 53.28  Mobility Inpatient CMS 0-100% Score: 20.91  Mobility Inpatient CMS G-Code Modifier : CJ    Goals  Short Term Goals  Time Frame for Short Term Goals: upon d/c  Short Term Goal 1: sup<>sit Ind  Short Term Goal 2: sit<>stand Ind  Short Term Goal 3: amb 150' with walker and supervision  Short Term Goal 4: up/down 8 steps with hand rail and SBA  Patient Goals   Patient Goals : to return home after this hospital stay         Therapy Time   Individual Concurrent Group Co-treatment   Time In 1048         Time Out 1128         Minutes 40         Timed Code Treatment Minutes: 30 Minutes       Homero Holbrook, PT

## 2025-01-27 ENCOUNTER — CARE COORDINATION (OUTPATIENT)
Dept: CASE MANAGEMENT | Age: 89
End: 2025-01-27

## 2025-01-27 ENCOUNTER — TELEPHONE (OUTPATIENT)
Dept: CARDIOLOGY CLINIC | Age: 89
End: 2025-01-27

## 2025-01-27 DIAGNOSIS — R55 SYNCOPE, UNSPECIFIED SYNCOPE TYPE: Primary | ICD-10-CM

## 2025-01-27 DIAGNOSIS — R42 DIZZINESS: Primary | ICD-10-CM

## 2025-01-27 PROCEDURE — 1111F DSCHRG MED/CURRENT MED MERGE: CPT | Performed by: FAMILY MEDICINE

## 2025-01-27 NOTE — TELEPHONE ENCOUNTER
Message received from Dr. Evans    \"I feel he does not need further inpatient cardiac testing and can have follow-up with his outpatient cardiologist Dr. Wolff; will arrange close follow-up in the next week or 2; can consider outpatient Holter monitor \"    Also noted family sent a My Chart question about appt. For him and his wife as well.     UPDATE 1/29  Next appointment with Dr. Wolff is 3/28/25.  Please advise if patient should get monitor prior to appointment or if patient needs to be seen sooner, identify a day and time for  to schedule. Thanks

## 2025-01-27 NOTE — CARE COORDINATION
Care Transitions Note    Initial Call - Call within 2 business days of discharge: Yes    Patient Current Location:  Home: [address] [place] [address]  [address]  [facility] [address]    Care Transition Nurse contacted the family, spoke to Ricardo  by telephone to perform post hospital discharge assessment, verified name and  as identifiers. Provided introduction to self, and explanation of the Care Transition Nurse role.     Patient: Leti Clinton    Patient : 1934   MRN: [id number]    Reason for Admission: syncope and collapse  Discharge Date: 25  RURS: No data recorded    Last Discharge Facility       Date Complaint Diagnosis Description Type Department Provider    25 Hand Injury; Hip Pain Syncope and collapse ... ED to Hosp-Admission (Discharged) (ADMITTED) Emmanuel Juan MD            Was this an external facility discharge? No    Additional needs identified to be addressed with provider   none             Method of communication with provider: chart routing.    Patients top risk factors for readmission: functional physical ability    Interventions to address risk factors:   Review of patient management of conditions/medications: dizziness    Care Summary Note: spoke to sonRicardo- HIPAA verified in system. Discussed patient's recent hospitalization and hand wound after fall. Ricardo confirmed he has AVS and medication list reviewed. Ricardo stated that PT/OT orders and home care visited today. Ricardo stated patient was already active with [facility] for therapy, but nurse was added to orders and suppose to visit today. Ricardo was concerned about patient's hand wound and management for dressing changes. Instructed Ricardo that home care nurse would discuss wound care and frequency orders at initial visit. Patient scheduled for PCP visit next week, but Ricardo will call PCP office for appt sooner if needed. Ricardo is scheduled to take patient and patient's wife on that already scheduled day.   Denied any acute

## 2025-01-29 NOTE — TELEPHONE ENCOUNTER
Called and spoke to daughter and she is agreeable to bring him for the monitor. She will bring him on 2/6 at around 11. Please schedule for 14 day monitor on 2/6 at 11. Patient is aware.

## 2025-02-03 ENCOUNTER — OFFICE VISIT (OUTPATIENT)
Dept: FAMILY MEDICINE CLINIC | Age: 89
End: 2025-02-03

## 2025-02-03 VITALS
DIASTOLIC BLOOD PRESSURE: 62 MMHG | RESPIRATION RATE: 16 BRPM | OXYGEN SATURATION: 98 % | HEIGHT: 70 IN | BODY MASS INDEX: 21.1 KG/M2 | HEART RATE: 64 BPM | SYSTOLIC BLOOD PRESSURE: 110 MMHG | WEIGHT: 147.4 LBS

## 2025-02-03 DIAGNOSIS — S61.412D LACERATION OF LEFT HAND WITHOUT FOREIGN BODY, SUBSEQUENT ENCOUNTER: ICD-10-CM

## 2025-02-03 DIAGNOSIS — G20.A2 PARKINSON'S DISEASE WITHOUT DYSKINESIA, WITH FLUCTUATING MANIFESTATIONS (HCC): Primary | ICD-10-CM

## 2025-02-03 DIAGNOSIS — Z95.2 HX OF ARTIFICIAL HEART VALVE REPLACEMENT: ICD-10-CM

## 2025-02-03 DIAGNOSIS — I50.22 CHRONIC SYSTOLIC (CONGESTIVE) HEART FAILURE (HCC): ICD-10-CM

## 2025-02-03 DIAGNOSIS — Z87.81 H/O CERVICAL FRACTURE: ICD-10-CM

## 2025-02-03 DIAGNOSIS — Z79.01 ANTICOAGULATED ON COUMADIN: ICD-10-CM

## 2025-02-03 DIAGNOSIS — I95.1 ORTHOSTATIC HYPOTENSION: ICD-10-CM

## 2025-02-03 RX ORDER — WARFARIN SODIUM 10 MG/1
10 TABLET ORAL DAILY
Qty: 90 TABLET | Refills: 0 | Status: SHIPPED | OUTPATIENT
Start: 2025-02-03

## 2025-02-03 ASSESSMENT — PATIENT HEALTH QUESTIONNAIRE - PHQ9
1. LITTLE INTEREST OR PLEASURE IN DOING THINGS: NOT AT ALL
SUM OF ALL RESPONSES TO PHQ QUESTIONS 1-9: 1
SUM OF ALL RESPONSES TO PHQ9 QUESTIONS 1 & 2: 1
SUM OF ALL RESPONSES TO PHQ QUESTIONS 1-9: 1
SUM OF ALL RESPONSES TO PHQ QUESTIONS 1-9: 1
2. FEELING DOWN, DEPRESSED OR HOPELESS: SEVERAL DAYS
SUM OF ALL RESPONSES TO PHQ QUESTIONS 1-9: 1

## 2025-02-03 NOTE — PROGRESS NOTES
[facility] - Primary Care   PCP progress note     Patient: Leti Clinton : 1934  Sex: male  MRN: [id number]    Assessment and Plan:     Assessment & Plan  Parkinson's disease without dyskinesia, with fluctuating manifestations (HCC)  Patient has history of Parkinson's disease and working with Dr. Drake Oconnor:    Sounds that he may not always be compliant with his medications and this may be a significant factor with recent falls.  Will have social work reach out to see if we can get patient's medications put together in a blister pack.    Per neurology's last note  \"Increase Sinemet from 1 tab four times a day to 1.5 tab four times a day to try to improve fine motor control, tremor, risk of falls.  Continue Sinemet CR 50/200 1 tab at bedtime.  Continue selegiline 5 mg two times a day.  Continue salt tablets, 1000 mg two times a day to prevent blood pressure drops.  Continue to increase fluid intake where possible.  He was willing to do some home PT/OT which I think will be helpful for his condition.   We again talked about sialorrhea (drooling) in Parkinson's disease. This is due to decreased clearance of saliva due to the slowness of PD. Strategies to help with this include chewing gum or sucking on a hard candy to stimulate swallowing. If this is not helpful, consideration can be given to injections of botulinum toxin into the salivary glands.   We have found that patient care is improved through a team-based approach to care. The patient will follow up in 3 months with Alissa Dc NP and Dr. Oconnor in 6 months.  \"         Orthostatic hypotension  They are increasing sodium and pushing fluids.  Patient not on any additional blood pressure medications  Discussed importance of getting up slowly and moving slowly.  He is working with physical therapy at home    While his symptoms are considered with orthostatic hypotension, I am concerned that he may be having generalized hypertension at home as

## 2025-02-03 NOTE — ASSESSMENT & PLAN NOTE
Patient has history of Parkinson's disease and working with Dr. Drake Oconnor:    Sounds that he may not always be compliant with his medications and this may be a significant factor with recent falls.  Will have social work reach out to see if we can get patient's medications put together in a blister pack.    Per neurology's last note  \"Increase Sinemet from 1 tab four times a day to 1.5 tab four times a day to try to improve fine motor control, tremor, risk of falls.  Continue Sinemet CR 50/200 1 tab at bedtime.  Continue selegiline 5 mg two times a day.  Continue salt tablets, 1000 mg two times a day to prevent blood pressure drops.  Continue to increase fluid intake where possible.  He was willing to do some home PT/OT which I think will be helpful for his condition.   We again talked about sialorrhea (drooling) in Parkinson's disease. This is due to decreased clearance of saliva due to the slowness of PD. Strategies to help with this include chewing gum or sucking on a hard candy to stimulate swallowing. If this is not helpful, consideration can be given to injections of botulinum toxin into the salivary glands.   We have found that patient care is improved through a team-based approach to care. The patient will follow up in 3 months with Alissa Dc NP and Dr. Oconnor in 6 months.  \"

## 2025-02-03 NOTE — TELEPHONE ENCOUNTER
Last office visit 11/26/2024       Next office visit scheduled Visit date not found    Requested Prescriptions     Pending Prescriptions Disp Refills    warfarin (COUMADIN) 10 MG tablet [Pharmacy Med Name: WARFARIN SODIUM 10 MG TABLET] 90 tablet 0     Sig: TAKE 1 TABLET DAILY EXCEPT 5 MG EVERY SUNDAY OR AS DIRECTED BY [facility] [phone]

## 2025-02-03 NOTE — ASSESSMENT & PLAN NOTE
Controlled  Last EF 55 to 60%  Ischemic cardiomyopathy  Not on any GDMT due to hypotension

## 2025-02-04 ENCOUNTER — CARE COORDINATION (OUTPATIENT)
Dept: CASE MANAGEMENT | Age: 89
End: 2025-02-04

## 2025-02-04 PROBLEM — I95.1 ORTHOSTATIC HYPOTENSION: Status: ACTIVE | Noted: 2025-02-04

## 2025-02-04 PROBLEM — Z87.81 H/O CERVICAL FRACTURE: Status: ACTIVE | Noted: 2025-02-04

## 2025-02-04 NOTE — ASSESSMENT & PLAN NOTE
They are increasing sodium and pushing fluids.  Patient not on any additional blood pressure medications  Discussed importance of getting up slowly and moving slowly.  He is working with physical therapy at home    While his symptoms are considered with orthostatic hypotension, I am concerned that he may be having generalized hypertension at home as well.  Possible that patient is having autonomic instability due to underlying Parkinson's.  Discussed with family that there are not many options that we would be able to pursue aside from what they are already doing.  Considered midodrine for blood pressure support however with finding of significant stenoses on left heart cath previously, I am not sure it would be an appropriate medication for him.  They will bring it up with their other specialist as well

## 2025-02-04 NOTE — ASSESSMENT & PLAN NOTE
Found during hospitalization.  Was evaluated by neurosurgery and they did not recommend any intervention aside from cervical brace if patient is experiencing discomfort.  Likely that this is an old fracture as patient is not having any symptoms today.

## 2025-02-04 NOTE — CARE COORDINATION
Care Transitions Note    Follow Up Call     Patient Current Location:  Home: [address]  [address]  [facility] [address]    Care Transition Nurse contacted the family, Ricardo  by telephone. Verified name and  as identifiers.    Additional needs identified to be addressed with provider   No needs identified           Method of communication with provider: none.    Care Summary Note: spoke to sonRicardo- HIPAA verified in system. Discussed patient's follow up visit with  Dr Toribio yesterday. Ricardo stated patient will have Dr Toribio for PCP care. Confirmed home care continues to be active and hand wound is healing well. Patient continues to have low BP results in the mornings and will go up during the day. Stated patient has not had any further falls to the floor, but has slump back into his chair. Discussed medication change of carvidopa and concerned that increase could be contributing factor of unsteadiness.   Patient has increasing oral intake and drinking gatorade  Ricardo discussed upper body strength and shoulder exercises to help with parkinson symptoms. Going to discuss with OT therapist about exercises and possible print out exercises. CEI appointment tomorrow. Denied any acute needs at present time.  Agreeable to f/u calls.  Educated on the use of urgent care or physician’s 24 hr access line if assistance is needed after hours.    Plan of care updates since last contact:  Review of patient management of conditions/medications:         Advance Care Planning:   Does patient have an Advance Directive: reviewed during previous call, see note. .    Medication Review:  Full medication reconciliation completed during previous call.    Remote Patient Monitoring:  Offered patient enrollment in the Remote Patient Monitoring (RPM) program for in-home monitoring: Yes, but did not enroll at this time: already monitoring with home equipment.    Assessments:  Care Transitions Subsequent and Final Call

## 2025-02-06 ENCOUNTER — ANTI-COAG VISIT (OUTPATIENT)
Dept: PHARMACY | Age: 89
End: 2025-02-06
Payer: MEDICARE

## 2025-02-06 DIAGNOSIS — Z79.01 ANTICOAGULATED ON COUMADIN: Primary | ICD-10-CM

## 2025-02-06 DIAGNOSIS — Z95.2 HX OF ARTIFICIAL HEART VALVE REPLACEMENT: ICD-10-CM

## 2025-02-06 PROCEDURE — 85610 PROTHROMBIN TIME: CPT | Performed by: INTERNAL MEDICINE

## 2025-02-06 PROCEDURE — 99211 OFF/OP EST MAY X REQ PHY/QHP: CPT | Performed by: INTERNAL MEDICINE

## 2025-02-06 NOTE — PROGRESS NOTES
Leti Clinton is a 89 y.o. here for warfarin management.  Leti had an INR test today. Results were reviewed and appropriate warfarin management was completed.     Patient verifies current warfarin dosing regimen: Yes     Warfarin medication reviewed and updated on the patient 's home medication list: Yes   All other medications reviewed and updated on the patient 's home medication list: No: no changes     Lab Results   Component Value Date    INR 3.22 (H) 01/25/2025    INR 2.79 (H) 01/24/2025    INR 4.1 01/22/2025       Anticoagulation Summary  As of 2/6/2025      INR goal:  2.5-3.5   TTR:  61.6% (4 y)   INR used for dosing:  --   Warfarin maintenance plan:  5 mg (10 mg x 0.5) every Sun, Wed, Fri; 10 mg (10 mg x 1) all other days   Weekly warfarin total:  55 mg   Plan last modified:  Hannah Dc RPH (2/6/2025)   Next INR check:  2/26/2025   Priority:  Maintenance   Target end date:  Indefinite    Indications    Anticoagulated on Coumadin [Z79.01]  Mechanical AVR 1991 [Z95.2]                 Anticoagulation Episode Summary       INR check location:  --    Preferred lab:  --    Send INR reminders to:  [facility]    Comments:  EPIC  Consent: 1/15/24  son = Ricardo          Anticoagulation Care Providers       Provider Role Specialty Phone number    Scott Tereso JACKSON DO Referring Family Medicine [phone]          There were no vitals taken for this visit.    Warfarin assessment / plan:     Appears well  Supra-therapeutic INR.     Denies medication changes.  Denies extra warfarin doses.  Denies change in appetite.  Denies illness, fever, vomiting or diarrhea.     Signs or symptoms of bleeding.   Recent hospitalization / ED visit  Pt fell on 1/31 and went to the ER for bleeding from a cut that wouldn't stop bleeding    Description    No warfarin on Friday 2/7, then reduce dose to: Warfarin 10 mg (one tablet) daily except 5 mg every Sunday, Wednesday, and Friday.   Takes warfarin in the

## 2025-02-11 ENCOUNTER — CARE COORDINATION (OUTPATIENT)
Dept: CASE MANAGEMENT | Age: 89
End: 2025-02-11

## 2025-02-11 NOTE — CARE COORDINATION
Care Transitions Note    Follow Up Call     Attempted to reach landy breen Kirk  for transitions of care follow up.  Unable to reach patient.      Outreach Attempts:   HIPAA compliant voicemail left for landy breen Kirk.     Care Summary Note:     Follow Up Appointment:   Future Appointments         Provider Specialty Dept Phone    2/26/2025 11:20 AM [facility] [phone]    3/28/2025 1:00 PM Avel Wolff MD Cardiology [phone]    5/6/2025 10:45 AM Kristopher Toribio MD Family Medicine [phone]            Mary Lou Wallace RN

## 2025-02-13 ENCOUNTER — TELEPHONE (OUTPATIENT)
Dept: CARDIOLOGY CLINIC | Age: 89
End: 2025-02-13

## 2025-02-13 NOTE — TELEPHONE ENCOUNTER
Second patch would not connect.  She came to the office and received a new one. Went home and applied. Patch is now connected. Marbella was calling to confirm it was ok

## 2025-02-14 ENCOUNTER — CARE COORDINATION (OUTPATIENT)
Dept: CASE MANAGEMENT | Age: 89
End: 2025-02-14

## 2025-02-14 NOTE — CARE COORDINATION
Care Transitions Note    Follow Up Call     Attempted to reach Ricardo breen  for transitions of care follow up.  Unable to reach Ricardo breen .      Outreach Attempts:   Multiple attempts to contact Ricardo breen at phone numbers on file.     Care Summary Note: Second and final attempt made to reach patient for transition call.  VM left stating purpose of call along with my contact information requesting a return call.    Follow Up Appointment:   Future Appointments         Provider Specialty Dept Phone    2/26/2025 11:20 AM [facility] [phone]    3/28/2025 1:00 PM Avel Wolff MD Cardiology [phone]    5/6/2025 10:45 AM Kristopher Toribio MD Family Medicine [phone]            Mary Lou Wallace RN

## 2025-02-15 ENCOUNTER — APPOINTMENT (OUTPATIENT)
Dept: CT IMAGING | Age: 89
End: 2025-02-15
Payer: MEDICARE

## 2025-02-15 ENCOUNTER — HOSPITAL ENCOUNTER (OUTPATIENT)
Age: 89
Setting detail: OBSERVATION
Discharge: HOME | End: 2025-02-16
Attending: FAMILY MEDICINE | Admitting: FAMILY MEDICINE
Payer: MEDICARE

## 2025-02-15 ENCOUNTER — APPOINTMENT (OUTPATIENT)
Dept: GENERAL RADIOLOGY | Age: 89
End: 2025-02-15
Payer: MEDICARE

## 2025-02-15 DIAGNOSIS — Z79.01 ANTICOAGULATED: ICD-10-CM

## 2025-02-15 DIAGNOSIS — R55 NEAR SYNCOPE: ICD-10-CM

## 2025-02-15 DIAGNOSIS — G45.9 TIA (TRANSIENT ISCHEMIC ATTACK): Primary | ICD-10-CM

## 2025-02-15 DIAGNOSIS — I65.22 STENOSIS OF LEFT CAROTID ARTERY: ICD-10-CM

## 2025-02-15 DIAGNOSIS — S12.9XXD CLOSED FRACTURE OF SPINOUS PROCESS OF CERVICAL VERTEBRA, SUBSEQUENT ENCOUNTER: ICD-10-CM

## 2025-02-15 LAB
ALBUMIN SERPL-MCNC: 3.8 G/DL (ref 3.4–5)
ALBUMIN/GLOB SERPL: 1.5 {RATIO} (ref 1.1–2.2)
ALP SERPL-CCNC: 48 U/L (ref 40–129)
ALT SERPL-CCNC: <5 U/L (ref 10–40)
ANION GAP SERPL CALCULATED.3IONS-SCNC: 11 MMOL/L (ref 3–16)
APTT BLD: 40.5 SEC (ref 22.1–36.4)
AST SERPL-CCNC: 34 U/L (ref 15–37)
BASOPHILS # BLD: 0 K/UL (ref 0–0.2)
BASOPHILS NFR BLD: 0.2 %
BILIRUB SERPL-MCNC: 0.7 MG/DL (ref 0–1)
BILIRUB UR QL STRIP.AUTO: NEGATIVE
BUN SERPL-MCNC: 22 MG/DL (ref 7–20)
CALCIUM SERPL-MCNC: 9.2 MG/DL (ref 8.3–10.6)
CHLORIDE SERPL-SCNC: 101 MMOL/L (ref 99–110)
CLARITY UR: CLEAR
CO2 SERPL-SCNC: 26 MMOL/L (ref 21–32)
COLOR UR: YELLOW
CREAT SERPL-MCNC: 1 MG/DL (ref 0.8–1.3)
DEPRECATED RDW RBC AUTO: 15.4 % (ref 12.4–15.4)
EOSINOPHIL # BLD: 0 K/UL (ref 0–0.6)
EOSINOPHIL NFR BLD: 0.1 %
FLUAV + FLUBV AG NOSE IA.RAPID: NOT DETECTED
FLUAV + FLUBV AG NOSE IA.RAPID: NOT DETECTED
GFR SERPLBLD CREATININE-BSD FMLA CKD-EPI: 71 ML/MIN/{1.73_M2}
GLUCOSE SERPL-MCNC: 120 MG/DL (ref 70–99)
GLUCOSE UR STRIP.AUTO-MCNC: NEGATIVE MG/DL
HCT VFR BLD AUTO: 38.6 % (ref 40.5–52.5)
HGB BLD-MCNC: 12.6 G/DL (ref 13.5–17.5)
HGB UR QL STRIP.AUTO: NEGATIVE
INR PPP: 2.23 (ref 0.85–1.15)
KETONES UR STRIP.AUTO-MCNC: NEGATIVE MG/DL
LEUKOCYTE ESTERASE UR QL STRIP.AUTO: NEGATIVE
LYMPHOCYTES # BLD: 0.6 K/UL (ref 1–5.1)
LYMPHOCYTES NFR BLD: 9.1 %
MCH RBC QN AUTO: 30.5 PG (ref 26–34)
MCHC RBC AUTO-ENTMCNC: 32.7 G/DL (ref 31–36)
MCV RBC AUTO: 93.1 FL (ref 80–100)
MONOCYTES # BLD: 0.5 K/UL (ref 0–1.3)
MONOCYTES NFR BLD: 7.4 %
NEUTROPHILS # BLD: 5.3 K/UL (ref 1.7–7.7)
NEUTROPHILS NFR BLD: 83.2 %
NITRITE UR QL STRIP.AUTO: NEGATIVE
PH UR STRIP.AUTO: 7 [PH] (ref 5–8)
PLATELET # BLD AUTO: 268 K/UL (ref 135–450)
PMV BLD AUTO: 8.1 FL (ref 5–10.5)
POTASSIUM SERPL-SCNC: 3.9 MMOL/L (ref 3.5–5.1)
PROT SERPL-MCNC: 6.3 G/DL (ref 6.4–8.2)
PROT UR STRIP.AUTO-MCNC: NEGATIVE MG/DL
PROTHROMBIN TIME: 24.7 SEC (ref 11.9–14.9)
RBC # BLD AUTO: 4.14 M/UL (ref 4.2–5.9)
SARS-COV-2 RDRP RESP QL NAA+PROBE: NOT DETECTED
SODIUM SERPL-SCNC: 138 MMOL/L (ref 136–145)
SP GR UR STRIP.AUTO: 1.01 (ref 1–1.03)
TROPONIN, HIGH SENSITIVITY: 26 NG/L (ref 0–22)
TROPONIN, HIGH SENSITIVITY: 29 NG/L (ref 0–22)
UA COMPLETE W REFLEX CULTURE PNL UR: NORMAL
UA DIPSTICK W REFLEX MICRO PNL UR: NORMAL
URN SPEC COLLECT METH UR: NORMAL
UROBILINOGEN UR STRIP-ACNC: 0.2 E.U./DL
WBC # BLD AUTO: 6.4 K/UL (ref 4–11)

## 2025-02-15 PROCEDURE — 85025 COMPLETE CBC W/AUTO DIFF WBC: CPT

## 2025-02-15 PROCEDURE — G0378 HOSPITAL OBSERVATION PER HR: HCPCS

## 2025-02-15 PROCEDURE — 6370000000 HC RX 637 (ALT 250 FOR IP): Performed by: FAMILY MEDICINE

## 2025-02-15 PROCEDURE — 70496 CT ANGIOGRAPHY HEAD: CPT

## 2025-02-15 PROCEDURE — 81003 URINALYSIS AUTO W/O SCOPE: CPT

## 2025-02-15 PROCEDURE — 2500000003 HC RX 250 WO HCPCS: Performed by: FAMILY MEDICINE

## 2025-02-15 PROCEDURE — 99285 EMERGENCY DEPT VISIT HI MDM: CPT

## 2025-02-15 PROCEDURE — 85610 PROTHROMBIN TIME: CPT

## 2025-02-15 PROCEDURE — 71045 X-RAY EXAM CHEST 1 VIEW: CPT

## 2025-02-15 PROCEDURE — 87502 INFLUENZA DNA AMP PROBE: CPT

## 2025-02-15 PROCEDURE — 6360000004 HC RX CONTRAST MEDICATION: Performed by: GENERAL ACUTE CARE HOSPITAL

## 2025-02-15 PROCEDURE — 87635 SARS-COV-2 COVID-19 AMP PRB: CPT

## 2025-02-15 PROCEDURE — 80053 COMPREHEN METABOLIC PANEL: CPT

## 2025-02-15 PROCEDURE — 96372 THER/PROPH/DIAG INJ SC/IM: CPT

## 2025-02-15 PROCEDURE — 36415 COLL VENOUS BLD VENIPUNCTURE: CPT

## 2025-02-15 PROCEDURE — 6360000002 HC RX W HCPCS: Performed by: FAMILY MEDICINE

## 2025-02-15 PROCEDURE — 70450 CT HEAD/BRAIN W/O DYE: CPT

## 2025-02-15 PROCEDURE — 85730 THROMBOPLASTIN TIME PARTIAL: CPT

## 2025-02-15 PROCEDURE — 84484 ASSAY OF TROPONIN QUANT: CPT

## 2025-02-15 PROCEDURE — 93005 ELECTROCARDIOGRAM TRACING: CPT | Performed by: GENERAL ACUTE CARE HOSPITAL

## 2025-02-15 RX ORDER — MAGNESIUM SULFATE IN WATER 40 MG/ML
2000 INJECTION, SOLUTION INTRAVENOUS PRN
Status: DISCONTINUED | OUTPATIENT
Start: 2025-02-15 | End: 2025-02-16 | Stop reason: HOSPADM

## 2025-02-15 RX ORDER — SODIUM CHLORIDE 9 MG/ML
INJECTION, SOLUTION INTRAVENOUS PRN
Status: DISCONTINUED | OUTPATIENT
Start: 2025-02-15 | End: 2025-02-16 | Stop reason: HOSPADM

## 2025-02-15 RX ORDER — POTASSIUM CHLORIDE 1500 MG/1
40 TABLET, EXTENDED RELEASE ORAL PRN
Status: DISCONTINUED | OUTPATIENT
Start: 2025-02-15 | End: 2025-02-16 | Stop reason: HOSPADM

## 2025-02-15 RX ORDER — ENOXAPARIN SODIUM 100 MG/ML
40 INJECTION SUBCUTANEOUS NIGHTLY
Status: DISCONTINUED | OUTPATIENT
Start: 2025-02-15 | End: 2025-02-16

## 2025-02-15 RX ORDER — SELEGILINE HYDROCHLORIDE 5 MG/1
5 TABLET ORAL 2 TIMES DAILY
Status: DISCONTINUED | OUTPATIENT
Start: 2025-02-16 | End: 2025-02-16 | Stop reason: HOSPADM

## 2025-02-15 RX ORDER — SODIUM CHLORIDE 0.9 % (FLUSH) 0.9 %
5-40 SYRINGE (ML) INJECTION EVERY 12 HOURS SCHEDULED
Status: DISCONTINUED | OUTPATIENT
Start: 2025-02-15 | End: 2025-02-16 | Stop reason: HOSPADM

## 2025-02-15 RX ORDER — ONDANSETRON 4 MG/1
4 TABLET, ORALLY DISINTEGRATING ORAL EVERY 8 HOURS PRN
Status: DISCONTINUED | OUTPATIENT
Start: 2025-02-15 | End: 2025-02-16 | Stop reason: HOSPADM

## 2025-02-15 RX ORDER — ONDANSETRON 2 MG/ML
4 INJECTION INTRAMUSCULAR; INTRAVENOUS EVERY 6 HOURS PRN
Status: DISCONTINUED | OUTPATIENT
Start: 2025-02-15 | End: 2025-02-16 | Stop reason: HOSPADM

## 2025-02-15 RX ORDER — ACETAMINOPHEN 650 MG/1
650 SUPPOSITORY RECTAL EVERY 6 HOURS PRN
Status: DISCONTINUED | OUTPATIENT
Start: 2025-02-15 | End: 2025-02-16 | Stop reason: HOSPADM

## 2025-02-15 RX ORDER — SODIUM CHLORIDE 1 G/1
1 TABLET ORAL 2 TIMES DAILY
Status: DISCONTINUED | OUTPATIENT
Start: 2025-02-15 | End: 2025-02-16 | Stop reason: HOSPADM

## 2025-02-15 RX ORDER — POLYETHYLENE GLYCOL 3350 17 G/17G
17 POWDER, FOR SOLUTION ORAL DAILY PRN
Status: DISCONTINUED | OUTPATIENT
Start: 2025-02-15 | End: 2025-02-16 | Stop reason: HOSPADM

## 2025-02-15 RX ORDER — ACETAMINOPHEN 325 MG/1
650 TABLET ORAL EVERY 6 HOURS PRN
Status: DISCONTINUED | OUTPATIENT
Start: 2025-02-15 | End: 2025-02-16 | Stop reason: HOSPADM

## 2025-02-15 RX ORDER — POTASSIUM CHLORIDE 7.45 MG/ML
10 INJECTION INTRAVENOUS PRN
Status: DISCONTINUED | OUTPATIENT
Start: 2025-02-15 | End: 2025-02-16 | Stop reason: HOSPADM

## 2025-02-15 RX ORDER — IOPAMIDOL 755 MG/ML
75 INJECTION, SOLUTION INTRAVASCULAR
Status: COMPLETED | OUTPATIENT
Start: 2025-02-15 | End: 2025-02-15

## 2025-02-15 RX ORDER — CARBIDOPA AND LEVODOPA 25; 100 MG/1; MG/1
1.5 TABLET ORAL 4 TIMES DAILY
Status: DISCONTINUED | OUTPATIENT
Start: 2025-02-15 | End: 2025-02-16 | Stop reason: HOSPADM

## 2025-02-15 RX ORDER — WARFARIN SODIUM 5 MG/1
10 TABLET ORAL DAILY
Status: DISCONTINUED | OUTPATIENT
Start: 2025-02-15 | End: 2025-02-15

## 2025-02-15 RX ORDER — SODIUM CHLORIDE 0.9 % (FLUSH) 0.9 %
5-40 SYRINGE (ML) INJECTION PRN
Status: DISCONTINUED | OUTPATIENT
Start: 2025-02-15 | End: 2025-02-16 | Stop reason: HOSPADM

## 2025-02-15 RX ORDER — CARBIDOPA AND LEVODOPA 50; 200 MG/1; MG/1
1 TABLET, EXTENDED RELEASE ORAL NIGHTLY
Status: DISCONTINUED | OUTPATIENT
Start: 2025-02-15 | End: 2025-02-16 | Stop reason: HOSPADM

## 2025-02-15 RX ADMIN — CARBIDOPA AND LEVODOPA 1.5 TABLET: 25; 100 TABLET ORAL at 18:51

## 2025-02-15 RX ADMIN — CARBIDOPA AND LEVODOPA 1 TABLET: 50; 200 TABLET, EXTENDED RELEASE ORAL at 22:41

## 2025-02-15 RX ADMIN — SODIUM CHLORIDE, PRESERVATIVE FREE 10 ML: 5 INJECTION INTRAVENOUS at 22:39

## 2025-02-15 RX ADMIN — Medication 1 G: at 22:28

## 2025-02-15 RX ADMIN — ENOXAPARIN SODIUM 40 MG: 100 INJECTION SUBCUTANEOUS at 22:28

## 2025-02-15 RX ADMIN — IOPAMIDOL 75 ML: 755 INJECTION, SOLUTION INTRAVENOUS at 12:50

## 2025-02-15 ASSESSMENT — PAIN - FUNCTIONAL ASSESSMENT: PAIN_FUNCTIONAL_ASSESSMENT: NONE - DENIES PAIN

## 2025-02-15 NOTE — PROGRESS NOTES
Clinical Pharmacy Note  Warfarin Consult    Leti Clinton is a 89 y.o. male receiving warfarin managed by pharmacy.  Patient being bridged with Lovenox.    Warfarin Indication: Mechanical AVR   Target INR range: 2.5-3.5   Dose prior to admission: Warfarin 10 mg (one tablet) daily except 5 mg every Sunday, Wednesday, and Friday.     Current warfarin drug-drug interactions: none    Recent Labs     02/15/25  1314   HGB 12.6*   HCT 38.6*   INR 2.23*       Assessment/Plan:    No Warfarin tonight. Patient received dose prior to admission. Daily PT/INR until stable within therapeutic range.     Thank you for the consult.  Will continue to follow.  Aishwarya Trujillo [facility],2/15/2025,5:48 PM

## 2025-02-15 NOTE — ED PROVIDER NOTES
**ADVANCED PRACTICE PROVIDER, I HAVE EVALUATED THIS PATIENT**        [facility] EMERGENCY DEPARTMENT  EMERGENCY DEPARTMENT ENCOUNTER      Pt Name: Leti Clinton  MRN:[id number]  Birthdate 2/19/1934  Date of evaluation: 2/15/2025  Provider: DYLLAN Sevilla CNP  Note Started: 1:00 PM EST 2/15/25        Chief Complaint:    Chief Complaint   Patient presents with    Loss of Vision     Pt had vision loss for approx 1 hour earlier today.   mobile stroke unit was dispatched and cleared him from a stroke standpoint.  Pt did not receive a CT scan from [facility].           Nursing Notes, Past Medical Hx, Past Surgical Hx, Social Hx, Allergies, and Family Hx were all reviewed and agreed with or any disagreements were addressed in the HPI.    HPI: (Location, Duration, Timing, Severity, Quality, Assoc Sx, Context, Modifying factors)    History From: Patient  Limitations to history : None    Social Determinants Significantly Affecting Health : None    Chief Complaint-    This is a  89 y.o. male with history of Parkinson's and aortic valve replacement who presents to the emergency department today via EMS from home for evaluation of near syncope and visual disturbance.  Patient states that approximately 930 this morning he was sitting at the kitchen table, states that he suddenly felt lightheaded as if he was going to \"pass out\".  He states that his vision became dark and blurry.  Per family, patient was slow to respond and appeared generally weak.  There was no noted facial droop.  There is no history of CVAs or TIAs.  No history of seizures.  Family states that patient had a similar episode approximately 3 weeks ago, was admitted here to this hospital.  Patient currently wearing heart monitor.  He is anticoagulated on Coumadin for history of aortic valve replacement.  There have been no recent falls or head injuries since being discharged in January.  Patient reports mild nasal congestion but denies having any cough.

## 2025-02-15 NOTE — PROGRESS NOTES
Patient in bed. A&Ox4. Respirations are easy and unlabored. Patient is SBA with a walker. Denies additional needs at this time. Call light is reach. Bed exit in place.     Electronically signed by Desiree Medellin RN on 2/15/2025 at 6:31 PM

## 2025-02-15 NOTE — PROGRESS NOTES
Pharmacy Medication Reconciliation Note     List of medications Leti Clinton is currently taking is complete.    Source of information:   1. Conversation with patient at bedside  2. EMR    Notes regarding home medications:   1. Patient reports taking all of his morning home medications today prior to ED arrival -- ASA 81 mg, Sinemet  mg 1.5 tablets QID, sodium chloride 1 gram BID, and warfarin 10 mg today.     2. Patient is on warfarin -- patient states regimen is 10 mg every Monday, Tuesday, Thursday, Saturday and 5 mg every Sunday, Wednesday and Friday -- per chart last anti-coag visit was 2/6/25 -- INR goal is 2.5-3.5 for a mechanical AVR -- per patient last INR was around 2.5.     3. Patient also takes Sinemet  mg at bedtime, along with the Sinemet  regimen.     4. Patient was asked about selegiline 5 mg and patient states that he does not take that medication, but that he takes a medication to \"enhance the Sinemet\" -- patient was told that selegiline is also used for Parkinson's but patient swears he does not take this medication -- patient was unable to think of the medication name, but said it was a \"blue and white capsule\" -- per drugs.com, selegiline comes in blue and white capsules.     5. Patient also takes senna 8.6 mg PRN for constipation.     Patient denies taking any OTC or herbal medications other than those mentioned.     Sacha Bazan, Pharmacy Intern  2/15/2025  4:14 PM

## 2025-02-15 NOTE — PROGRESS NOTES
4 Eyes Skin Assessment     NAME:  Leti Clinton  YOB: 1934  MEDICAL RECORD NUMBER:  [id number]    The patient is being assessed for  Admission    I agree that at least one RN has performed a thorough Head to Toe Skin Assessment on the patient. ALL assessment sites listed below have been assessed.      Areas assessed by both nurses:    Head, Face, Ears, Shoulders, Back, Chest, Arms, Elbows, Hands, Sacrum. Buttock, Coccyx, Ischium, Legs. Feet and Heels, and Under Medical Devices         Does the Patient have a Wound? No noted wound(s) Skin tear L hand       Dimitry Prevention initiated by RN: No  Wound Care Orders initiated by RN: No    Pressure Injury (Stage 3,4, Unstageable, DTI, NWPT, and Complex wounds) if present, place Wound referral order by RN under : No    New Ostomies, if present place, Ostomy referral order under : No     Nurse 1 eSignature: Electronically signed by Desiree Medellin RN on 2/15/25 at 6:38 PM EST    **SHARE this note so that the co-signing nurse can place an eSignature**    Nurse 2 eSignature: {Esignature:[id number]} eyes

## 2025-02-15 NOTE — H&P
V2.0  History and Physical      Name:  Leti Clinton /Age/Sex: 1934  (89 y.o. male)   MRN & CSN:  [id number] & [id number] Encounter Date/Time: 2/15/2025 4:19 PM EST   Location:  [address] [place] [address] PCP: Kristopher Toribio MD       Hospital Day: 1    Assessment and Plan:   Leti Clinton is a 89 y.o. male with a pmh of congestive heart failure, Parkinson's disease, orthostatic hypotension who presents with Syncope and collapse    Hospital Problems             Last Modified POA    * (Principal) Syncope and collapse 2/15/2025 Yes       Plan:  Syncope and collapse  Most likely in setting of orthostatic hypotension versus Parkinson's disease medication  CT head without contrast shows no acute finding  CTA head and neck shows 60% stenosis of the origin of the left internal carotid artery otherwise no acute finding  Subacute mildly displaced fracture of the C7 spinous process  PT OT to evaluate  Neurology consult    2.  Parkinson's disease  Patient is currently on Sinemet 4 times a day plus long-acting overnight  Recent increase in medications about 1 month ago to 1.5 mg 4 times a day plus long-acting overnight    Will continue to follow, monitor and manage chronic medical condition with medication listed below    Disposition:   Current Living situation: Home  Expected Disposition: Home  Estimated D/C: 1-2 days    Diet Diet NPO   DVT Prophylaxis [] Lovenox, []  Heparin, [] SCDs, [x] Ambulation,  [] Eliquis, [] Xarelto, [] Coumadin   Code Status Prior         Personally reviewed Lab Studies and Imaging             History from:     patient, daughter, ED physician, medical record    History of Present Illness:     Chief Complaint: Syncope and collapse  Leti Clinton is a 89 y.o. male with pmh of congestive heart failure, Parkinson disease, thoracic hypotension who presents with syncope and collapse    Patient presents from home.  Earlier in the day patient felt very weak possibility of loss of vision.  He states that he was

## 2025-02-16 VITALS
HEIGHT: 70 IN | SYSTOLIC BLOOD PRESSURE: 107 MMHG | BODY MASS INDEX: 20.8 KG/M2 | OXYGEN SATURATION: 98 % | TEMPERATURE: 97.4 F | DIASTOLIC BLOOD PRESSURE: 56 MMHG | HEART RATE: 71 BPM | RESPIRATION RATE: 18 BRPM | WEIGHT: 145.28 LBS

## 2025-02-16 LAB
ALBUMIN SERPL-MCNC: 3.6 G/DL (ref 3.4–5)
ALBUMIN/GLOB SERPL: 1.5 {RATIO} (ref 1.1–2.2)
ALP SERPL-CCNC: 53 U/L (ref 40–129)
ALT SERPL-CCNC: <5 U/L (ref 10–40)
ANION GAP SERPL CALCULATED.3IONS-SCNC: 7 MMOL/L (ref 3–16)
AST SERPL-CCNC: 32 U/L (ref 15–37)
BILIRUB SERPL-MCNC: 0.4 MG/DL (ref 0–1)
BUN SERPL-MCNC: 23 MG/DL (ref 7–20)
CALCIUM SERPL-MCNC: 9.2 MG/DL (ref 8.3–10.6)
CHLORIDE SERPL-SCNC: 108 MMOL/L (ref 99–110)
CO2 SERPL-SCNC: 25 MMOL/L (ref 21–32)
CREAT SERPL-MCNC: 0.9 MG/DL (ref 0.8–1.3)
DEPRECATED RDW RBC AUTO: 14.8 % (ref 12.4–15.4)
EKG ATRIAL RATE: 67 BPM
EKG DIAGNOSIS: NORMAL
EKG P AXIS: 91 DEGREES
EKG P-R INTERVAL: 266 MS
EKG Q-T INTERVAL: 484 MS
EKG QRS DURATION: 128 MS
EKG QTC CALCULATION (BAZETT): 511 MS
EKG R AXIS: -18 DEGREES
EKG T AXIS: 111 DEGREES
EKG VENTRICULAR RATE: 67 BPM
GFR SERPLBLD CREATININE-BSD FMLA CKD-EPI: 81 ML/MIN/{1.73_M2}
GLUCOSE SERPL-MCNC: 69 MG/DL (ref 70–99)
HCT VFR BLD AUTO: 36.7 % (ref 40.5–52.5)
HGB BLD-MCNC: 12.6 G/DL (ref 13.5–17.5)
INR PPP: 2.59 (ref 0.85–1.15)
MCH RBC QN AUTO: 31.1 PG (ref 26–34)
MCHC RBC AUTO-ENTMCNC: 34.4 G/DL (ref 31–36)
MCV RBC AUTO: 90.6 FL (ref 80–100)
PLATELET # BLD AUTO: 263 K/UL (ref 135–450)
PMV BLD AUTO: 8.3 FL (ref 5–10.5)
POTASSIUM SERPL-SCNC: 4.1 MMOL/L (ref 3.5–5.1)
PROT SERPL-MCNC: 6 G/DL (ref 6.4–8.2)
PROTHROMBIN TIME: 27.7 SEC (ref 11.9–14.9)
RBC # BLD AUTO: 4.06 M/UL (ref 4.2–5.9)
SODIUM SERPL-SCNC: 140 MMOL/L (ref 136–145)
WBC # BLD AUTO: 6.5 K/UL (ref 4–11)

## 2025-02-16 PROCEDURE — 97162 PT EVAL MOD COMPLEX 30 MIN: CPT

## 2025-02-16 PROCEDURE — 85610 PROTHROMBIN TIME: CPT

## 2025-02-16 PROCEDURE — 6370000000 HC RX 637 (ALT 250 FOR IP): Performed by: FAMILY MEDICINE

## 2025-02-16 PROCEDURE — 80053 COMPREHEN METABOLIC PANEL: CPT

## 2025-02-16 PROCEDURE — 93010 ELECTROCARDIOGRAM REPORT: CPT | Performed by: INTERNAL MEDICINE

## 2025-02-16 PROCEDURE — 85027 COMPLETE CBC AUTOMATED: CPT

## 2025-02-16 PROCEDURE — G0378 HOSPITAL OBSERVATION PER HR: HCPCS

## 2025-02-16 PROCEDURE — 97530 THERAPEUTIC ACTIVITIES: CPT

## 2025-02-16 PROCEDURE — 94760 N-INVAS EAR/PLS OXIMETRY 1: CPT

## 2025-02-16 PROCEDURE — 36415 COLL VENOUS BLD VENIPUNCTURE: CPT

## 2025-02-16 RX ORDER — WARFARIN SODIUM 7.5 MG/1
7.5 TABLET ORAL
Status: DISCONTINUED | OUTPATIENT
Start: 2025-02-16 | End: 2025-02-16 | Stop reason: HOSPADM

## 2025-02-16 RX ADMIN — Medication 1 G: at 08:27

## 2025-02-16 RX ADMIN — SELEGILINE HYDROCHLORIDE 5 MG: 5 TABLET ORAL at 12:52

## 2025-02-16 RX ADMIN — CARBIDOPA AND LEVODOPA 1.5 TABLET: 25; 100 TABLET ORAL at 08:27

## 2025-02-16 RX ADMIN — CARBIDOPA AND LEVODOPA 1.5 TABLET: 25; 100 TABLET ORAL at 12:52

## 2025-02-16 RX ADMIN — SELEGILINE HYDROCHLORIDE 5 MG: 5 TABLET ORAL at 08:27

## 2025-02-16 NOTE — PLAN OF CARE
Problem: Chronic Conditions and Co-morbidities  Goal: Patient's chronic conditions and co-morbidity symptoms are monitored and maintained or improved  Outcome: Progressing     Problem: Discharge Planning  Goal: Discharge to home or other facility with appropriate resources  Outcome: Progressing     Problem: ABCDS Injury Assessment  Goal: Absence of physical injury  Outcome: Progressing     Problem: Safety - Adult  Goal: Free from fall injury  Outcome: Progressing     Problem: Skin/Tissue Integrity - Adult  Goal: Skin integrity remains intact  Outcome: Progressing  Goal: Incisions, wounds, or drain sites healing without S/S of infection  Outcome: Progressing  Goal: Oral mucous membranes remain intact  Outcome: Progressing

## 2025-02-16 NOTE — CONSULTS
NEUROLOGY CONSULT  NOTE :    Reason for Consult: syncope and collapse    History of Present Illness:  Leti Clinton is a 89 y.o. male who is being seen in consultation at the request of Raymon Lugo DO for evaluation of syncope and collapse. History was obtained from the patient and admitting physician note and according to them, patient felt very weak and was sitting at the kitchen table where he felt very dizzy and lightheaded and a feeling of about to pass out and vision became very dark and blurry and EMS was called and  stroke team presented to the house and cleared him from a stroke standpoint and was told to go to the ER for further evaluation and they checked his blood pressure to be 80 systolic on their presentation attributing most of symptoms are due to orthostatic hypotension.  Patient did present few weeks ago to the hospital with similar presentation where he was extensively worked up and was discharged. Patient is awake and sitting in the chair and ready to be discharged and denies any headaches, vision changes, weakness, tingling numbness and dizziness and compliant with the medication carbidopa/levodopa and denies any side effects.    Medical History:  Past Medical History:   Diagnosis Date    Colon polyps     Mechanical heart valve present     S/P aortic valve replacement with metallic valve     1991     Past Surgical History:   Procedure Laterality Date    AORTIC VALVE REPLACEMENT      CATARACT REMOVAL  2021    COLONOSCOPY  2008        COLONOSCOPY  08/01/2012    Dr Bales    COLONOSCOPY  2018    several polyps found follow up in three years    DIAGNOSTIC CARDIAC CATH LAB PROCEDURE  05/09/2022    EYE SURGERY  04/2024     Medications Prior to Admission: warfarin (COUMADIN) 10 MG tablet, Take 1 tablet by mouth daily EXCEPT 5 mg every Wednesday and Sunday  or as directed by [facility] [phone] (Patient taking differently:

## 2025-02-16 NOTE — PROGRESS NOTES
Patient discharged home with daughter. Patient daughter concern over recent increase of sinemet from 1tab qid to 1.5tab qid causing orthostatic hypotension. Advised to call pcp and discuss. All belongings gathered and verified. Skin tear with clean dry intact dressing.

## 2025-02-16 NOTE — PROGRESS NOTES
Clinical Pharmacy Note  Warfarin Consult    Leti Clinton is a 89 y.o. male receiving warfarin managed by pharmacy.     Warfarin Indication: Mechanical AVR   Target INR range: 2.5-3.5   Dose prior to admission: Warfarin 10 mg (one tablet) daily except 5 mg every Sunday, Wednesday, and Friday    Current warfarin drug-drug interactions: none    Recent Labs     02/15/25  1314 02/16/25  0424   HGB 12.6* 12.6*   HCT 38.6* 36.7*   INR 2.23* 2.59*       Assessment/Plan:    INR back into therapeutic range today. Lovenox stopped.    Will give warfarin 7.5 mg x 1 tonight as patient on lower end of goal range.    Daily PT/INR until stable within therapeutic range.     Thank you for the consult.  Will continue to follow.  Sandra Nuñez RPH,2/16/2025,10:50 AM

## 2025-02-16 NOTE — PROGRESS NOTES
V2.0    [facility] Progress Note      Name:  Leti Clinton /Age/Sex: 1934  (89 y.o. male)   MRN & CSN:  [id number] & [id number] Encounter Date/Time: 2025 8:13 AM EST   Location:  [id number] PCP: Kristopher Toribio MD     Attending:Raymon Medina DO       Hospital Day: 2  Brief Hospital Course  Leti Clinton is a 89 y.o. male with pertinent PMHx of mechanical aortic valve, Parkinson's disease, CHF who presented after near syncopal episode at home.  Assessment & Plan     Near syncope  Orthostatic hypotension  -Low suspicion for cardiac arrhythmia, no events on telemetry, has Holter monitor in place  -CT head in the ED nonacute, CTA head and neck without evidence of large vessel occlusion  -Continue salt tablets  -Encourage compression stocking use  -Educated on trigger avoidance, slow changes in positions  -If recurrent issues can consider midodrine or fludrocortisone as an outpatient, although unlikely to be a good candidate for midodrine as the day has had significant stenosis on prior cardiac catheterization  -Outpatient follow-up with cardiology  -Likely discharge home today if okay with Neurology    Mechanical aortic valve  -Continue anticoagulation with warfarin  -Continue aspirin    Parkinson's disease  -Continue Sinemet  -Sinemet was recently increased as an outpatient, family reportedly concerned that increased Sinemet could be contributing to his symptoms, neurology consulted    Diet ADULT DIET; Regular   DVT Prophylaxis [] Lovenox, []  Heparin, [] SCDs, [] Ambulation,  [] Eliquis, [] Xarelto  [x] Coumadin   Code Status Full Code   Disposition From: Home  Expected Disposition: Home  Estimated Date of Discharge: -           Subjective:     Chief Complaint: Near syncope    Patient was seen and examined today sitting up in chair in room  Orthostatic vitals were positive this morning though improved today and was up walking halls with therapy  Denies any symptoms currently, states

## 2025-02-16 NOTE — PROGRESS NOTES
Physical Therapy  Facility/Department: [address] MED SURG  Physical Therapy Initial Assessment and Discharge    Name: Leti Clinton  : 1934  MRN: [id number]  Date of Service: 2025    Discharge Recommendations:  Home with assist PRN          Patient Diagnosis(es): The primary encounter diagnosis was TIA (transient ischemic attack). Diagnoses of Near syncope, Stenosis of left carotid artery, Closed fracture of spinous process of cervical vertebra, subsequent encounter, and Anticoagulated were also pertinent to this visit.  Past Medical History:  has a past medical history of Colon polyps, Mechanical heart valve present, and S/P aortic valve replacement with metallic valve.  Past Surgical History:  has a past surgical history that includes Aortic valve replacement; Colonoscopy (); Colonoscopy (2012); eye surgery (2024); Colonoscopy (); Diagnostic Cardiac Cath Lab Procedure (2022); and Cataract removal ().    Assessment  Assessment: Leti Clinton is a 89 y.o. male with a pmh of congestive heart failure, Parkinson's disease, orthostatic hypotension who presents with Syncope and collapse. Prior to admission, pt living with spouse in condo setting, independent with ADLs and ambulatory without device. Pt currently functioning near baseline - only limitation is potential orthostatics. Anticipate return home with PRN assist.  Decision Making: Medium Complexity  History: see above  Exam: see below  Clinical Presentation: evolving  No Skilled PT: Safe to return home  Requires PT Follow-Up: No  Activity Tolerance  Activity Tolerance: Patient tolerated treatment well    Plan  Physical Therapy Plan  General Plan: Discharge  Safety Devices  Type of Devices: Call light within reach, Left in chair, Chair alarm in place    Restrictions  Restrictions/Precautions  Restrictions/Precautions: Fall Risk  Position Activity Restriction  Other Position/Activity Restrictions: Orthostatics positive but

## 2025-02-16 NOTE — DISCHARGE SUMMARY
V2.0  Discharge Summary    Name:  Leti Clinton /Age/Sex: 1934 (89 y.o. male)   Admit Date: 2/15/2025  Discharge Date: 25    MRN & CSN:  [id number] & [id number] Encounter Date and Time 25 1:32 PM EST    Attending:  Raymon Medina DO Discharging Provider: Raymon Medina DO       Hospital Course:     Brief HPI: Leti Clinton is a 89 y.o. male with pertinent PMHx of mechanical aortic valve, Parkinson's disease, CHF who presented after near syncopal episode at home.  Initially in the ED patient was evaluated for TIA versus CVA and his CT head was nonacute as well as CTA head and neck with no large vessel occlusion.  He was reportedly evaluated by [facility] stroke team and was deemed not to have a CVA.  He does have a known history of orthostatic hypotension and his blood pressure was low on presentation.  His orthostatic vitals were positive this morning as well.  This is been an ongoing issue as an outpatient for which she has been undergoing further evaluation.  He does currently have a Holter monitor in place and has follow-up scheduled with cardiology.  He also follows with neurology as an outpatient.  He had no further syncopal episodes while admission and worked well with physical therapy.  Provided education on orthostatic hypotension and encouraged fluid intake.  He continues on salt tablets and recommended lower extremity compression stockings.  He should follow-up with neurology and cardiology as an outpatient.    Brief Problem Based Course:     Near syncope  Orthostatic hypotension  -CT head nonacute, CTA head and neck without evidence of large vessel occlusion  -Continue salt tablets  -Bilateral lower extremity compression stockings  -Liberalize fluid intake  -Outpatient follow-up with cardiology and neurology    The patient expressed appropriate understanding of, and agreement with the discharge recommendations, medications, and plan.     Consults this admission:  IP CONSULT

## 2025-02-16 NOTE — CARE COORDINATION
Discharge order noted. SW attempted to meet with patient to find out if there were any discharge needs, however, bedside nurse indicated that family already escorted him home.     Chart review revealed that he was from home with family support. Therapies reported that he did not need assistance or DME at home.     No further needs noted at this time.     Respectfully submitted,    Lilian TEMPLETON, [place]LANDO  [facility]   [phone]    Electronically signed by YOKASTA Agosto, JESS on 2/16/2025 at 4:26 PM

## 2025-02-17 ENCOUNTER — CARE COORDINATION (OUTPATIENT)
Dept: CARE COORDINATION | Age: 89
End: 2025-02-17

## 2025-02-17 DIAGNOSIS — I95.1 ORTHOSTATIC HYPOTENSION: Primary | ICD-10-CM

## 2025-02-17 PROCEDURE — 1111F DSCHRG MED/CURRENT MED MERGE: CPT | Performed by: INTERNAL MEDICINE

## 2025-02-17 NOTE — CARE COORDINATION
Care Transitions Note    Initial Call - Call within 2 business days of discharge: Yes    Patient Current Location:  Home: [address] [place] [address]  [address]  [facility] [address]    Care Transition Nurse contacted the family, Marbella  by telephone to perform post hospital discharge assessment, verified name and  as identifiers. Provided introduction to self, and explanation of the Care Transition Nurse role.     Patient: Leti Clinton    Patient : 1934   MRN: [id number]    Reason for Admission: orthostatic hypotension  Discharge Date: 25  RURS: No data recorded    Last Discharge Facility       Date Complaint Diagnosis Description Type Department Provider    2/15/25 Loss of Vision TIA (transient ischemic attack) ... ED to Hosp-Admission (Discharged) (ADMITTED) YUE YunN Raymon Medina, ; Delaney Morales...            Was this an external facility discharge? No    Additional needs identified to be addressed with provider   No needs identified             Method of communication with provider: none.    Patients top risk factors for readmission: medical condition-orthostatic hypotension     Interventions to address risk factors:   Education: .    Care Summary Note: Spoke with Marbella who states Leti is doing ok. States he had some dizziness this morning - lasted around 1 hour before pt was able to get his morning started. Marbella feels the biggest factor is his sinemet. States her brother has a call out to neurology to see about medication change as he does not see them until April. States Leti has a walker but only uses this if absolutely necessary. Discussed hydration, nutrition, compression hose and slow positional changes. States pt has compression hose but finds these difficult to put on. States PT is to see pt tomorrow. Hand is healing well. Heart monitor is to be worn until Thursday. Encouraged a HFU as Marbella drives pt but she is hessitant due to flu and covid going around. She will call the office. Meds

## 2025-02-18 NOTE — TELEPHONE ENCOUNTER
Very minor anemia, I would not be concerned about this at all and would not be causing significant symptoms.

## 2025-02-18 NOTE — TELEPHONE ENCOUNTER
Spoke to son , he is going to contact his sister to schedule a hospital follow up .     But he did want you to look at his hemoglobin . They were worried with it being a little low is this something they should be concerned about ?

## 2025-02-20 ENCOUNTER — CARE COORDINATION (OUTPATIENT)
Dept: CARE COORDINATION | Age: 89
End: 2025-02-20

## 2025-02-20 NOTE — CARE COORDINATION
Care Transitions Note    Follow Up Call     Patient Current Location:  Home: [address]  [address]  [facility] [address]    Care Transition Nurse contacted the familyMarbella  by telephone. Verified name and  as identifiers.    Additional needs identified to be addressed with provider   No needs identified                 Method of communication with provider: none.    Care Summary Note: Marbella states pt is doing well. Denies any dizziness, lightheadedness or sycncopal episode. States they have not heard back from neurology regarding medication adjustment with Sinemet. They have a VV with PCP . Pt has [facility] coming to [facility] him. Denies any questions or concerns. Will continue to follow.     Plan of care updates since last contact:  Education: positional changes       Advance Care Planning:   Does patient have an Advance Directive: Not on file; patient encouraged to bring existing ACP documents to a [facility] facility..Per Marbella, pt has a living will - encouraged to bring into office.   Advance Care Planning   Healthcare Decision Maker:    Primary Decision Maker: OzGetachew - Spouse - [phone]    Secondary Decision Maker: Ricardo Clinton - Child - [phone]    Secondary Decision Maker: Karon Callahan - Child - [phone]    Medication Review:  No changes since last call.     Remote Patient Monitoring:  Offered patient enrollment in the Remote Patient Monitoring (RPM) program for in-home monitoring: Deferred at this time because .; will discuss at next outreach.    Assessments:  No changes since last call    Follow Up Appointment:   Reviewed upcoming appointment(s).  Future Appointments         Provider Specialty Dept Phone    2025 10:00 AM Kristopher Toribio MD Family Medicine [phone]    2025 11:20 AM [facility] [phone]    3/28/2025 1:00 PM Avel Wolff MD Cardiology [phone]    2025 10:45 AM Kristopher Toribio MD Family Medicine [phone]

## 2025-02-24 ENCOUNTER — TELEMEDICINE (OUTPATIENT)
Dept: FAMILY MEDICINE CLINIC | Age: 89
End: 2025-02-24

## 2025-02-24 DIAGNOSIS — I95.1 ORTHOSTATIC HYPOTENSION: Primary | ICD-10-CM

## 2025-02-24 DIAGNOSIS — D64.9 ANEMIA, UNSPECIFIED TYPE: ICD-10-CM

## 2025-02-24 RX ORDER — FLUDROCORTISONE ACETATE 0.1 MG/1
0.1 TABLET ORAL DAILY
Qty: 30 TABLET | Refills: 3 | Status: SHIPPED | OUTPATIENT
Start: 2025-02-24

## 2025-02-24 NOTE — PROGRESS NOTES
[facility] - Primary Care   PCP progress note: Video Visit     Patient: Leti Clinton : 1934  Sex: male  MRN: [id number]    Assessment and Plan:     Assessment & Plan  Orthostatic hypotension  Long discussion on difficult balance that we are having to strike with patient.  Unclear if hypotension is really more of an issue of orthostatic or if he is having autonomic instability due to his Parkinson's disease.  Neurology has adjusted Sinemet to see if this will make a difference,  As discussed previously, they had discussed midodrine with their cardiologist in the past who did not recommend this.    Initially recommended abdominal binder.  Upon reviewing some additional literature, I called them back this evening around 5:20 PM and discussed starting fludrocortisone.  While this may put patient at risk of decompensated heart failure, his last echo does look fairly stable with grade 1 diastolic dysfunction, which very well could be normal in 89-year old.  Additionally discussed monitoring for signs of decompensated heart failure and if we are diligent and continue to monitor for these, if fludrocortisone does seem to be tipping towards the, they are failure, I think we will be able to catch this and were worse with diuretic without patient requiring hospitalization.    Also discussed raising lower extremities above level of the heart and acute scenario of hypotension, could also try squatting/bearing down,     Will plan to follow-up in about 2 weeks         Anemia, unspecified type  Discussed that this is very minimal and I do not think is significant contributing to symptoms, but they are concerned and have a general sense of every little bit could help which I do not think is an unreasonable approach in his case.  Will workup for iron and vitamin deficiency.    Orders:    CBC with Auto Differential; Future    Ferritin; Future    Vitamin B12; Future    Folate; Future         -Risks and benefit as well as

## 2025-02-25 NOTE — ASSESSMENT & PLAN NOTE
Long discussion on difficult balance that we are having to strike with patient.  Unclear if hypotension is really more of an issue of orthostatic or if he is having autonomic instability due to his Parkinson's disease.  Neurology has adjusted Sinemet to see if this will make a difference,  As discussed previously, they had discussed midodrine with their cardiologist in the past who did not recommend this.    Initially recommended abdominal binder.  Upon reviewing some additional literature, I called them back this evening around 5:20 PM and discussed starting fludrocortisone.  While this may put patient at risk of decompensated heart failure, his last echo does look fairly stable with grade 1 diastolic dysfunction, which very well could be normal in 89-year old.  Additionally discussed monitoring for signs of decompensated heart failure and if we are diligent and continue to monitor for these, if fludrocortisone does seem to be tipping towards the, they are failure, I think we will be able to catch this and were worse with diuretic without patient requiring hospitalization.    Also discussed raising lower extremities above level of the heart and acute scenario of hypotension, could also try squatting/bearing down,     Will plan to follow-up in about 2 weeks

## 2025-02-26 ENCOUNTER — ANTI-COAG VISIT (OUTPATIENT)
Dept: PHARMACY | Age: 89
End: 2025-02-26
Payer: MEDICARE

## 2025-02-26 DIAGNOSIS — D64.9 ANEMIA, UNSPECIFIED TYPE: ICD-10-CM

## 2025-02-26 DIAGNOSIS — Z79.01 ANTICOAGULATED ON COUMADIN: Primary | ICD-10-CM

## 2025-02-26 DIAGNOSIS — Z95.2 HX OF ARTIFICIAL HEART VALVE REPLACEMENT: ICD-10-CM

## 2025-02-26 LAB
FERRITIN SERPL IA-MCNC: 42.5 NG/ML (ref 30–400)
FOLATE SERPL-MCNC: 7.85 NG/ML (ref 4.78–24.2)
INTERNATIONAL NORMALIZATION RATIO, POC: 2.9
PROTHROMBIN TIME, POC: 0
VIT B12 SERPL-MCNC: 433 PG/ML (ref 211–911)

## 2025-02-26 PROCEDURE — 85610 PROTHROMBIN TIME: CPT | Performed by: PHARMACIST

## 2025-02-26 PROCEDURE — 99211 OFF/OP EST MAY X REQ PHY/QHP: CPT | Performed by: PHARMACIST

## 2025-02-26 NOTE — PROGRESS NOTES
Leti Clinton is a 89 y.o. here for warfarin management.  Leti had an INR test today. Results were reviewed and appropriate warfarin management was completed.     Patient verifies current warfarin dosing regimen: Yes     Warfarin medication reviewed and updated on the patient 's home medication list: Yes   All other medications reviewed and updated on the patient 's home medication list: Yes     Lab Results   Component Value Date    INR 2.9 2025    INR 2.59 (H) 2025    INR 2.23 (H) 02/15/2025     Patient Findings       Positives:  Change in medications, Hospital admission    Negatives:  Signs/symptoms of thrombosis, Signs/symptoms of bleeding, Laboratory test error suspected, Change in health, Change in alcohol use, Change in activity, Upcoming invasive procedure, Emergency department visit, Upcoming dental procedure, Missed doses, Extra doses, Change in diet/appetite, Bruising, Other complaints    Comments:  Hospital admission for low blood pressure. Starting fludrocortisone daily tomorrow          Anticoagulation Summary  As of 2025      INR goal:  2.5-3.5   TTR:  62.0% (4.1 y)   INR used for dosin.9 (2025)   Warfarin maintenance plan:  5 mg (10 mg x 0.5) every Sun, Wed, Fri; 10 mg (10 mg x 1) all other days   Weekly warfarin total:  55 mg   Plan last modified:  Hannah Dc RPH (2025)   Next INR check:  3/19/2025   Priority:  Maintenance   Target end date:  Indefinite    Indications    Anticoagulated on Coumadin [Z79.01]  Mechanical AVR 1991 [Z95.2]                 Anticoagulation Episode Summary       INR check location:  --    Preferred lab:  --    Send INR reminders to:  [facility]    Comments:  EPIC  Consent: 1/15/24  son = Ricardo          Anticoagulation Care Providers       Provider Role Specialty Phone number    Tereso Chavez DO Referring Family Medicine [phone]          There were no vitals taken for this visit.    Warfarin assessment

## 2025-02-27 ENCOUNTER — CARE COORDINATION (OUTPATIENT)
Dept: CASE MANAGEMENT | Age: 89
End: 2025-02-27

## 2025-02-27 NOTE — CARE COORDINATION
Care Transitions Note    Follow Up Call    Reason for Admission: orthostatic hypotension     Patient Current Location:  Home: [address] [place] [address]  [address]  [facility] [address]    [facility] Care Coordinator contacted the family, Son Ricardo  by telephone. Verified name and  as identifiers.    Additional needs identified to be addressed with provider   No needs identified                 Method of communication with provider: none.    Care Summary Note: Spoke with son Ricardo who reported that patient is doing pretty good. Son stated that patient has had some a some good past couple of day. Son stated that patient do still have low BP in the mornings but as the day goes on it gets better. Son did not have any actual values. Son denied any more episodes on dizziness, syncope, or collapse. Son reported that patient started a new medication this morning Fludrocortisone. So far patient with no complaints. Son asked how long will HHC continue? Writer advised son to contact [facility] and speak with nurse. Son stated that patient has a wound on left hand that doing good but patient will not change dressing. Nurse seen patient yesterday and did dressing change. Writer advised that when son speak to nurse ask about visits and to be trained on wound dressing and son v/u. Writer reviewed CHF management tool. Son does not think patient do daily weights but v/u of the importance. Son reported that patient is taking all medications as ordered. Son denied any other needs at this time. Son instructed to continue to monitor s/s, reporting any that may present to MD immediately for early intervention. Son is agreeable to f/u calls.    Plan of care updates since last contact:  New medication Fludrocortisone       Advance Care Planning   The patient has the following advanced directives on file:  Advance Directives       Power of  Living Will ACP-Advance Directive ACP-Power of     Not on File Not on File Not on File Not on File

## 2025-03-06 ENCOUNTER — CARE COORDINATION (OUTPATIENT)
Dept: CASE MANAGEMENT | Age: 89
End: 2025-03-06

## 2025-03-06 NOTE — CARE COORDINATION
Care Transitions Note    Follow Up Call     Patient Current Location:  Home: [address]  [address]  [facility] [address]    Care Transition Nurse contacted the family, Son Ricardo  by telephone. Verified name and  as identifiers.    Additional needs identified to be addressed with provider   No needs identified                 Method of communication with provider: none.    Care Summary Note: Spoke briefly with patient's son Ricardo today for f/u call. He says his Dad is doing ok. Says he is working with PT, says he has history of exercise and taking care of himself. Appetite is good. He just had a holter monitor, waiting for results. Seeing Cardio at the end of the month. No problems or issues noted. CTN will follow up at a later time.     Plan of care updates since last contact:  No change       Assessments:  Care Transitions Subsequent and Final Call    Subsequent and Final Calls  Are you currently active with any services?: Home Health  Care Transitions Interventions   Home Care Waiver: Completed     Other Interventions:              Follow Up Appointment:   [facility] appointment attended as scheduled   Future Appointments         Provider Specialty Dept Phone    3/19/2025 11:20 AM [facility] [phone]    3/28/2025 1:00 PM Avel Wolff MD Cardiology [phone]    2025 10:45 AM Kristopher Toribio MD Family Medicine [phone]            Care Transition Nurse provided contact information.  Plan for follow-up call in 6-10 days based on severity of symptoms and risk factors.  Plan for next call: symptom management-working with PT, appetite       Sandy Carrion RN

## 2025-03-13 ENCOUNTER — CARE COORDINATION (OUTPATIENT)
Dept: CARE COORDINATION | Age: 89
End: 2025-03-13

## 2025-03-13 NOTE — CARE COORDINATION
Care Transitions Note    Follow Up Call     Attempted to reach family, .  for transitions of care follow up.  Unable to reach family, . .      Outreach Attempts:   HIPAA compliant voicemail left for patient.     Care Summary Note: Follow up outreach call attempt, no answer.  CTN left VM with contact information and request for return call.  CTN will continue with outreach call attempts.      Follow Up Appointment:   Future Appointments         Provider Specialty Dept Phone    3/19/2025 11:20 AM [facility] [phone]    3/28/2025 1:00 PM Avel Wolff MD Cardiology [phone]    5/6/2025 10:45 AM Kristopher Toribio MD Family Medicine [phone]            Plan for follow-up call in 2-5 days based on severity of symptoms and risk factors. Plan for next call: symptom management-.    MARGI Cook, RN   Care Transition Nurse  Mobile: [phone]

## 2025-03-19 ENCOUNTER — ANTI-COAG VISIT (OUTPATIENT)
Dept: PHARMACY | Age: 89
End: 2025-03-19
Payer: MEDICARE

## 2025-03-19 DIAGNOSIS — Z95.2 HX OF ARTIFICIAL HEART VALVE REPLACEMENT: ICD-10-CM

## 2025-03-19 DIAGNOSIS — Z79.01 ANTICOAGULATED ON COUMADIN: Primary | ICD-10-CM

## 2025-03-19 LAB
INTERNATIONAL NORMALIZATION RATIO, POC: 2.6
PROTHROMBIN TIME, POC: 0

## 2025-03-19 PROCEDURE — 99211 OFF/OP EST MAY X REQ PHY/QHP: CPT

## 2025-03-19 PROCEDURE — 85610 PROTHROMBIN TIME: CPT

## 2025-03-19 NOTE — PROGRESS NOTES
Leti Clinton is a 89 y.o. here for warfarin management.  Leti had an INR test today. Results were reviewed and appropriate warfarin management was completed.     Patient verifies current warfarin dosing regimen: Yes     Warfarin medication reviewed and updated on the patient 's home medication list: Yes   All other medications reviewed and updated on the patient 's home medication list: No new medications     Lab Results   Component Value Date    INR 2.6 2025    INR 2.9 2025    INR 2.59 (H) 2025     Patient Findings       Negatives:  Signs/symptoms of thrombosis, Signs/symptoms of bleeding, Change in health, Missed doses, Change in medications, Change in diet/appetite, Bruising          Anticoagulation Summary  As of 3/19/2025      INR goal:  2.5-3.5   TTR:  62.5% (4.2 y)   INR used for dosin.6 (3/19/2025)   Warfarin maintenance plan:  5 mg (10 mg x 0.5) every Sun, Wed, Fri; 10 mg (10 mg x 1) all other days   Weekly warfarin total:  55 mg   Plan last modified:  Hannah Dc RPH (2025)   Next INR check:  2025   Priority:  Maintenance   Target end date:  Indefinite    Indications    Anticoagulated on Coumadin [Z79.01]  Mechanical AVR 1991 [Z95.2]                 Anticoagulation Episode Summary       INR check location:  --    Preferred lab:  --    Send INR reminders to:  [facility]    Comments:  EPIC  Consent: 1/15/24  son = Ricardo          Anticoagulation Care Providers       Provider Role Specialty Phone number    Tereso Chavez DO Referring Family Medicine [phone]          There were no vitals taken for this visit.    Warfarin assessment / plan:     Patient appears well today.      No changes affecting warfarin therapy were noted.   No acute findings with regards to warfarin therapy.  INR today is within goal range.     Instructed to continue the same weekly warfarin dose.      Continue Warfarin 10 mg (one tablet) daily except 5 mg every

## 2025-03-20 ENCOUNTER — CARE COORDINATION (OUTPATIENT)
Dept: CASE MANAGEMENT | Age: 89
End: 2025-03-20

## 2025-03-20 NOTE — CARE COORDINATION
Care Transitions Note    Final Call     Attempted to reach patient for transitions of care follow up.  Unable to reach patient.      Outreach Attempts:   HIPAA compliant voicemail left for patient.     Patient closed (unable to reach patient) from the Care Transitions program on 3/20/25.  Patient/family  n/a .      Handoff:   Patient was not referred to the ACM team due to unable to contact patient.      Care Summary Note: final call for transitions as well as 2nd unsuccessful attempt to reach for Care Transition follow up call. HIPAA compliant message left requesting call back.CT program closed per protocol, no further outreach scheduled.         Upcoming Appointments:    Future Appointments         Provider Specialty Dept Phone    3/28/2025 1:00 PM Avel Wolff MD Cardiology [phone]    4/14/2025 11:00 AM [facility] [phone]    5/6/2025 10:45 AM Kristopher Toribio MD Family Medicine [phone]            Lorena Bradley RN

## 2025-03-28 ENCOUNTER — OFFICE VISIT (OUTPATIENT)
Dept: CARDIOLOGY CLINIC | Age: 89
End: 2025-03-28
Payer: MEDICARE

## 2025-03-28 VITALS
SYSTOLIC BLOOD PRESSURE: 118 MMHG | HEART RATE: 72 BPM | RESPIRATION RATE: 15 BRPM | OXYGEN SATURATION: 97 % | DIASTOLIC BLOOD PRESSURE: 80 MMHG | WEIGHT: 148.4 LBS | BODY MASS INDEX: 21.29 KG/M2

## 2025-03-28 DIAGNOSIS — R55 SYNCOPE, UNSPECIFIED SYNCOPE TYPE: ICD-10-CM

## 2025-03-28 DIAGNOSIS — I25.5 ISCHEMIC CARDIOMYOPATHY: ICD-10-CM

## 2025-03-28 DIAGNOSIS — Z95.2 HX OF ARTIFICIAL HEART VALVE REPLACEMENT: ICD-10-CM

## 2025-03-28 DIAGNOSIS — E78.2 MIXED HYPERLIPIDEMIA: ICD-10-CM

## 2025-03-28 DIAGNOSIS — I25.10 CORONARY ARTERY DISEASE INVOLVING NATIVE CORONARY ARTERY OF NATIVE HEART WITHOUT ANGINA PECTORIS: Primary | ICD-10-CM

## 2025-03-28 DIAGNOSIS — I95.1 ORTHOSTATIC HYPOTENSION DYSAUTONOMIC SYNDROME: ICD-10-CM

## 2025-03-28 PROCEDURE — 1159F MED LIST DOCD IN RCRD: CPT | Performed by: INTERNAL MEDICINE

## 2025-03-28 PROCEDURE — 1123F ACP DISCUSS/DSCN MKR DOCD: CPT | Performed by: INTERNAL MEDICINE

## 2025-03-28 PROCEDURE — 99214 OFFICE O/P EST MOD 30 MIN: CPT | Performed by: INTERNAL MEDICINE

## 2025-03-28 NOTE — PROGRESS NOTES
[facility]  Cardiology Consult    Leti Clinton  2/19/1934 March 28, 2025    PCP: Dr. COREY Chavez   Former Cardiologist: Dr. Homero Gonzales     Reason for Referral: Hypotension    CC: \"I am doing okay.\"      Subjective:     History of Present Illness:    Leti Clinton is a 89 y.o. patient with a PMH significant for Parkinson's, HTN, mechanical AVR 1991 in [facility] on Coumadin (PCP managed), Aspirin, Toprol-xl  therapy.     Today, he is here for follow up. He has been feeling okay. He had a syncopal episode twice but this has not had reoccurred. His medications that he currently takes controls his blood pressure. Patient denies exertional chest pain/pressure, dyspnea at rest, DALTON, PND, orthopnea, palpitations, lightheadedness, weight changes, changes in LE edema, and syncope. Patient reports compliance to his medications.     Past Medical History:   has a past medical history of Colon polyps, Mechanical heart valve present, and S/P aortic valve replacement with metallic valve.    Surgical History:   has a past surgical history that includes Aortic valve replacement; Colonoscopy (2008); Colonoscopy (08/01/2012); eye surgery (04/2024); Colonoscopy (2018); Diagnostic Cardiac Cath Lab Procedure (05/09/2022); Cataract removal (2021); and Cardiac catheterization (05/09/2022).     Social History:   reports that he has never smoked. He has never used smokeless tobacco. He reports that he does not currently use alcohol after a past usage of about 1.0 standard drink of alcohol per week. He reports that he does not use drugs.     Family History:  family history includes Breast Cancer in his mother and sister; Cancer in his mother; Heart Disease in his mother; Parkinsonism in his father; Prostate Cancer in his maternal grandfather; Stomach Cancer in his paternal grandfather.    Home Medications:  Were reviewed and are listed in nursing record and/or below  Prior to Admission medications    Medication

## 2025-04-14 ENCOUNTER — ANTI-COAG VISIT (OUTPATIENT)
Dept: PHARMACY | Age: 89
End: 2025-04-14
Payer: MEDICARE

## 2025-04-14 DIAGNOSIS — Z95.2 HX OF ARTIFICIAL HEART VALVE REPLACEMENT: ICD-10-CM

## 2025-04-14 DIAGNOSIS — Z79.01 ANTICOAGULATED ON COUMADIN: Primary | ICD-10-CM

## 2025-04-14 LAB
INTERNATIONAL NORMALIZATION RATIO, POC: 1.3
PROTHROMBIN TIME, POC: 0

## 2025-04-14 PROCEDURE — 85610 PROTHROMBIN TIME: CPT

## 2025-04-14 PROCEDURE — 99212 OFFICE O/P EST SF 10 MIN: CPT

## 2025-04-14 NOTE — PROGRESS NOTES
Leti Clinton is a 89 y.o. here for warfarin management.  Leti had an INR test today. Results were reviewed and appropriate warfarin management was completed.     Patient verifies current warfarin dosing regimen: Yes     Warfarin medication reviewed and updated on the patient 's home medication list: Yes   All other medications reviewed and updated on the patient 's home medication list: Yes     Lab Results   Component Value Date    INR 1.3 2025    INR 2.6 2025    INR 2.9 2025     Patient Findings       Positives:  Change in diet/appetite    Negatives:  Signs/symptoms of thrombosis, Signs/symptoms of bleeding, Missed doses, Change in medications, Bruising    Comments:  - cabbage soup X2 this week which had a lot of cabbage. Also had a Kale salad.           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    Warfarin assessment / plan:     Appears well  Sub-therapeutic INR     Denies missed doses.  Denies medication changes.  Denies signs or symptoms of clotting.  Denies signs or symptoms of a stroke     Caregiver fills his pill boxes, does not feel the warfarin

## 2025-04-15 RX ORDER — FLUDROCORTISONE ACETATE 0.1 MG/1
0.1 TABLET ORAL DAILY
Qty: 90 TABLET | Refills: 3 | Status: SHIPPED | OUTPATIENT
Start: 2025-04-15

## 2025-04-25 RX ORDER — CARBIDOPA AND LEVODOPA 25; 100 MG/1; MG/1
TABLET ORAL
Qty: 720 TABLET | Refills: 3 | Status: CANCELLED | OUTPATIENT
Start: 2025-04-25

## 2025-04-25 NOTE — TELEPHONE ENCOUNTER
Last office visit 11/26/2024     Next office visit scheduled Visit date not found    Requested Prescriptions     Pending Prescriptions Disp Refills    carbidopa-levodopa (SINEMET)  MG per tablet 720 tablet 3     Sig: Take two tablets PO four times a day

## 2025-04-28 ENCOUNTER — ANTI-COAG VISIT (OUTPATIENT)
Dept: PHARMACY | Age: 89
End: 2025-04-28
Payer: MEDICARE

## 2025-04-28 DIAGNOSIS — Z79.01 ANTICOAGULATED ON COUMADIN: Primary | ICD-10-CM

## 2025-04-28 DIAGNOSIS — Z95.2 HX OF ARTIFICIAL HEART VALVE REPLACEMENT: ICD-10-CM

## 2025-04-28 LAB
INTERNATIONAL NORMALIZATION RATIO, POC: 1.8
PROTHROMBIN TIME, POC: 0

## 2025-04-28 PROCEDURE — 85610 PROTHROMBIN TIME: CPT

## 2025-04-28 PROCEDURE — 99212 OFFICE O/P EST SF 10 MIN: CPT

## 2025-04-28 RX ORDER — WARFARIN SODIUM 10 MG/1
10 TABLET ORAL DAILY
Qty: 90 TABLET | Refills: 3 | Status: SHIPPED | OUTPATIENT
Start: 2025-04-28

## 2025-04-28 NOTE — PROGRESS NOTES
Leti Clinton is a 89 y.o. here for warfarin management.  Leti had an INR test today. Results were reviewed and appropriate warfarin management was completed.     Patient verifies current warfarin dosing regimen: Yes     Warfarin medication reviewed and updated on the patient 's home medication list: Yes   All other medications reviewed and updated on the patient 's home medication list: No: no changes     Lab Results   Component Value Date    INR 1.8 2025    INR 1.3 2025    INR 2.6 2025     Patient Findings       Negatives:  Signs/symptoms of thrombosis, Signs/symptoms of bleeding, Change in medications, Change in diet/appetite, Bruising          Anticoagulation Summary  As of 2025      INR goal:  2.5-3.5   TTR:  61.0% (4.3 y)   INR used for dosin.8 (2025)   Warfarin maintenance plan:  5 mg (10 mg x 0.5) every Sun; 10 mg (10 mg x 1) all other days   Weekly warfarin total:  65 mg   Plan last modified:  Mecca Holden RPH (2025)   Next INR check:  2025   Priority:  Maintenance   Target end date:  Indefinite    Indications    Anticoagulated on Coumadin [Z79.01]  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    Warfarin assessment / plan:     Appears well  Sub-therapeutic INR     Denies missed doses.  Denies increased vitamin K intake.  Denies medication changes.  Denies signs or symptoms of clotting.  Denies signs or symptoms of a stroke     Last INR was 1.3. Could not find reason for low INR. Daughter fills his pill holders.     INR remains below target. Daughter is confident that she

## 2025-04-28 NOTE — TELEPHONE ENCOUNTER
Last office visit 11/26/2024       Next office visit scheduled Visit date not found    Requested Prescriptions     Pending Prescriptions Disp Refills    warfarin (COUMADIN) 10 MG tablet [Pharmacy Med Name: WARFARIN SODIUM 10 MG TABLET] 85 tablet 1     Sig: TAKE 1 TAB DAILY EXCEPT 5MG EVERY WED & SUN OR AS DIRECTED BY [facility] [phone]

## 2025-05-06 ENCOUNTER — OFFICE VISIT (OUTPATIENT)
Dept: FAMILY MEDICINE CLINIC | Age: 89
End: 2025-05-06

## 2025-05-06 VITALS
RESPIRATION RATE: 14 BRPM | SYSTOLIC BLOOD PRESSURE: 154 MMHG | HEIGHT: 70 IN | BODY MASS INDEX: 21.64 KG/M2 | OXYGEN SATURATION: 97 % | DIASTOLIC BLOOD PRESSURE: 92 MMHG | WEIGHT: 151.2 LBS | HEART RATE: 74 BPM

## 2025-05-06 DIAGNOSIS — R35.0 URINARY FREQUENCY: ICD-10-CM

## 2025-05-06 DIAGNOSIS — T50.0X5A: ICD-10-CM

## 2025-05-06 DIAGNOSIS — Z13.1 ENCOUNTER FOR SCREENING FOR DIABETES MELLITUS: ICD-10-CM

## 2025-05-06 DIAGNOSIS — R35.0 BENIGN PROSTATIC HYPERPLASIA WITH URINARY FREQUENCY: ICD-10-CM

## 2025-05-06 DIAGNOSIS — I95.1 ORTHOSTATIC HYPOTENSION: Primary | ICD-10-CM

## 2025-05-06 DIAGNOSIS — N40.1 BENIGN PROSTATIC HYPERPLASIA WITH URINARY FREQUENCY: ICD-10-CM

## 2025-05-06 LAB
ALBUMIN SERPL-MCNC: 4.2 G/DL (ref 3.4–5)
ALBUMIN/GLOB SERPL: 1.9 {RATIO} (ref 1.1–2.2)
ALP SERPL-CCNC: 85 U/L (ref 40–129)
ALT SERPL-CCNC: 7 U/L (ref 10–40)
ANION GAP SERPL CALCULATED.3IONS-SCNC: 9 MMOL/L (ref 3–16)
AST SERPL-CCNC: 35 U/L (ref 15–37)
BILIRUB SERPL-MCNC: 0.6 MG/DL (ref 0–1)
BUN SERPL-MCNC: 21 MG/DL (ref 7–20)
CALCIUM SERPL-MCNC: 9.6 MG/DL (ref 8.3–10.6)
CHLORIDE SERPL-SCNC: 103 MMOL/L (ref 99–110)
CO2 SERPL-SCNC: 29 MMOL/L (ref 21–32)
CREAT SERPL-MCNC: 0.8 MG/DL (ref 0.8–1.3)
EST. AVERAGE GLUCOSE BLD GHB EST-MCNC: 102.5 MG/DL
GFR SERPLBLD CREATININE-BSD FMLA CKD-EPI: 83 ML/MIN/{1.73_M2}
GLUCOSE SERPL-MCNC: 88 MG/DL (ref 70–99)
HBA1C MFR BLD: 5.2 %
POTASSIUM SERPL-SCNC: 4 MMOL/L (ref 3.5–5.1)
PROT SERPL-MCNC: 6.4 G/DL (ref 6.4–8.2)
SODIUM SERPL-SCNC: 141 MMOL/L (ref 136–145)

## 2025-05-06 RX ORDER — TAMSULOSIN HYDROCHLORIDE 0.4 MG/1
0.4 CAPSULE ORAL DAILY
Qty: 90 CAPSULE | Refills: 1 | Status: SHIPPED | OUTPATIENT
Start: 2025-05-06

## 2025-05-06 RX ORDER — FLUDROCORTISONE ACETATE 0.1 MG/1
0.05 TABLET ORAL DAILY
Qty: 90 TABLET | Refills: 3
Start: 2025-05-06 | End: 2025-05-07

## 2025-05-06 NOTE — PATIENT INSTRUCTIONS
Decrease fludrocortisone to 1/2 tablet daily.     Ok to decrease salt tablet to once a day.     Take flomax once daily in the morning for urination. It will help decrease your urination frequency. Let me know if you get worsening light headedness or dizziness    Call me if blood pressure stays above 140/90

## 2025-05-07 ENCOUNTER — RESULTS FOLLOW-UP (OUTPATIENT)
Dept: FAMILY MEDICINE CLINIC | Age: 89
End: 2025-05-07

## 2025-05-07 RX ORDER — FLUDROCORTISONE ACETATE 0.1 MG/1
0.1 TABLET ORAL EVERY OTHER DAY
Qty: 90 TABLET | Refills: 3
Start: 2025-05-07

## 2025-05-12 ENCOUNTER — ANTI-COAG VISIT (OUTPATIENT)
Dept: PHARMACY | Age: 89
End: 2025-05-12
Payer: MEDICARE

## 2025-05-12 DIAGNOSIS — Z95.2 HX OF ARTIFICIAL HEART VALVE REPLACEMENT: ICD-10-CM

## 2025-05-12 DIAGNOSIS — Z79.01 ANTICOAGULATED ON COUMADIN: Primary | ICD-10-CM

## 2025-05-12 LAB
INTERNATIONAL NORMALIZATION RATIO, POC: 3.2
PROTHROMBIN TIME, POC: 0

## 2025-05-12 PROCEDURE — 85610 PROTHROMBIN TIME: CPT

## 2025-05-12 PROCEDURE — 99211 OFF/OP EST MAY X REQ PHY/QHP: CPT

## 2025-05-12 NOTE — PROGRESS NOTES
Leti Clinton is a 89 y.o. here for warfarin management.  Leti had an INR test today. Results were reviewed and appropriate warfarin management was completed.     Patient verifies current warfarin dosing regimen: Yes     Warfarin medication reviewed and updated on the patient 's home medication list: Yes   All other medications reviewed and updated on the patient 's home medication list: Yes     Lab Results   Component Value Date    INR 3.2 05/12/2025    INR 1.8 04/28/2025    INR 1.3 04/14/2025       Anticoagulation Summary  As of 5/12/2025      INR goal:  2.5-3.5   TTR:  60.9% (4.3 y)   INR used for dosing:  3.2 (5/12/2025)   Warfarin maintenance plan:  5 mg (10 mg x 0.5) every Sun; 10 mg (10 mg x 1) all other days   Weekly warfarin total:  65 mg   Plan last modified:  Mecca Holden RPH (4/28/2025)   Next INR check:  6/2/2025   Priority:  Maintenance   Target end date:  Indefinite    Indications    Anticoagulated on Coumadin [Z79.01]  Mechanical AVR 1991 [Z95.2]                 Anticoagulation Episode Summary       INR check location:  --    Preferred lab:  --    Send INR reminders to:  [facility]    Comments:  EPIC  Consent: 1/15/24  son = Ricardo          Anticoagulation Care Providers       Provider Role Specialty Phone number    Tereso Chavez DO Referring Family Medicine [phone]          There were no vitals taken for this visit.    Warfarin assessment / plan:     Patient appears well today.      No changes affecting warfarin therapy were noted.   No acute findings with regards to warfarin therapy.  INR today is within goal range.     Instructed to continue the same weekly warfarin dose.    See in 3 weeks    Description    Continue  Warfarin 10 mg (one tablet) daily except 5 mg every Sunday  Takes warfarin in the AM    Ensure 1 can daily (adds an extra 1-2 cans per week)   Eating 1-2 vitamin K green salad (green leaf or jeniffer) per week    Call [phone] with signs or

## 2025-05-13 ENCOUNTER — CARE COORDINATION (OUTPATIENT)
Dept: CARE COORDINATION | Age: 89
End: 2025-05-13

## 2025-05-13 NOTE — CARE COORDINATION
Ambulatory Care Coordination Note     2025 8:42 AM     Patient Current Location:  Home: [address]  [address]  [place] [place] [facility] [address]     This patient was received as a referral from Provider.    ACM contacted the family by telephone. Verified name and  with family as identifiers. Provided introduction to self, and explanation of the ACM role.   Family accepted care management services at this time.          ACM: Kae Zaragoza RN     Challenges to be reviewed by the provider   Additional needs identified to be addressed with provider Yes  medications-spoke to daughter nash states his bp been decreasing here recently last checked 108/77 but thinks it goes lower. Wondering if any medication changes or also if the tamsulosin is causing this and the drowsy. Please advise.               Method of communication with provider: phone.    Utilization: Initial Call - N/A    Care Summary Note: Spoke to daughter nash. States they have not gone to dental providers in years, would like resources per her plan and agreed to sw referral for 5.15.25. Does not monitor bp daily. States she does if he is symptomatic. States she was over there  and he seemed drowsy and they took his bp it was 108/77 and thinks it may have dipped lower. I encouraged to monitor it closely, I did offer the rpm program but she thinks it would be a technology barrier. Advised I will send pcp message to see if advise or medication changes. States she does a lot to help and is a huge support system. Does the medication pill packs and takes to appointments etc... Did state if needed we can look at extra in home support and sw also could review that information/process. Educated s/s to seek medical attention and safety precautions. Will send sw referral and pcp message and follow back up.  Plan:  Fu sw referral dental set up per plan,coa???extra support  Fu bp been checking it???did any meds get changed/med check  Send edu r/t

## 2025-05-15 ENCOUNTER — CARE COORDINATION (OUTPATIENT)
Dept: CARE COORDINATION | Age: 89
End: 2025-05-15

## 2025-05-15 NOTE — CARE COORDINATION
SW received referral from [facility] for assistance with community resource needs. SW placed call to patient's daughter, Marbella; left voicemail and requested return call. Will continue to follow.    Sarita Sims MSW, LSW     [phone]

## 2025-05-22 ENCOUNTER — CARE COORDINATION (OUTPATIENT)
Dept: CARE COORDINATION | Age: 89
End: 2025-05-22

## 2025-05-22 NOTE — CARE COORDINATION
PUNEET placed second outreach call to patient's daughter, Marbella, to discuss resource needs. Patient does not have AD's on file and daughter reports that their son, Ricardo, takes care of this side of things and has a copy of their Living Will. PUNEET advised it is possible they already have it in place, but is able to send AD packet just in case it is needed. Marbella requests for packet to be mailed to Ricardo; PUNEET to send mail request to [facility] on this date for two copies of AD packet to be sent.   Address: Ricardo Clinton   [address] [place] [id number]     PUNEET discussed dental providers located through patient's online provider search and she is agreeable to receive via e-mail: [email or web address]. Per Marblela, patient and spouse have  who assist with routine upkeep of the home. They also have a private caregiver who takes patient to the grocery store every two weeks. Patient and spouse are able to complete personal care tasks.     E-mail sent to Marbella:  As discussed, please see the below dental providers that are in-network with [employer]. Please let me know if you have any other questions.     [facility]  [address], [address] [place] [address]  [phone]    [facility]  [address] [place] [address]  [phone]    [facility]  [address] [place] [address] [id number] [phone]    No other questions or concerns at this time. PUNEET to follow up in two weeks to ensure receipt of mailed AD packets.     Sarita Sims MSW, LSW     [phone]

## 2025-06-02 ENCOUNTER — CARE COORDINATION (OUTPATIENT)
Dept: CARE COORDINATION | Age: 89
End: 2025-06-02

## 2025-06-02 ENCOUNTER — ANTI-COAG VISIT (OUTPATIENT)
Dept: PHARMACY | Age: 89
End: 2025-06-02
Payer: MEDICARE

## 2025-06-02 DIAGNOSIS — Z95.2 HX OF ARTIFICIAL HEART VALVE REPLACEMENT: ICD-10-CM

## 2025-06-02 DIAGNOSIS — Z79.01 ANTICOAGULATED ON COUMADIN: Primary | ICD-10-CM

## 2025-06-02 LAB
INTERNATIONAL NORMALIZATION RATIO, POC: 3.5
PROTHROMBIN TIME, POC: 0

## 2025-06-02 PROCEDURE — 99211 OFF/OP EST MAY X REQ PHY/QHP: CPT

## 2025-06-02 PROCEDURE — 85610 PROTHROMBIN TIME: CPT

## 2025-06-02 SDOH — ECONOMIC STABILITY: HOUSING INSECURITY: IN THE LAST 12 MONTHS, WAS THERE A TIME WHEN YOU WERE NOT ABLE TO PAY THE MORTGAGE OR RENT ON TIME?: NO

## 2025-06-02 SDOH — HEALTH STABILITY: MENTAL HEALTH: HOW OFTEN DO YOU HAVE A DRINK CONTAINING ALCOHOL?: NEVER

## 2025-06-02 SDOH — SOCIAL STABILITY: SOCIAL INSECURITY: ARE YOU MARRIED, WIDOWED, DIVORCED, SEPARATED, NEVER MARRIED, OR LIVING WITH A PARTNER?: MARRIED

## 2025-06-02 SDOH — HEALTH STABILITY: PHYSICAL HEALTH: ON AVERAGE, HOW MANY DAYS PER WEEK DO YOU ENGAGE IN MODERATE TO STRENUOUS EXERCISE (LIKE A BRISK WALK)?: 0 DAYS

## 2025-06-02 SDOH — ECONOMIC STABILITY: FOOD INSECURITY: WITHIN THE PAST 12 MONTHS, THE FOOD YOU BOUGHT JUST DIDN'T LAST AND YOU DIDN'T HAVE MONEY TO GET MORE.: NEVER TRUE

## 2025-06-02 SDOH — ECONOMIC STABILITY: FOOD INSECURITY: HOW HARD IS IT FOR YOU TO PAY FOR THE VERY BASICS LIKE FOOD, HOUSING, MEDICAL CARE, AND HEATING?: NOT HARD AT ALL

## 2025-06-02 SDOH — HEALTH STABILITY: PHYSICAL HEALTH: ON AVERAGE, HOW MANY MINUTES DO YOU ENGAGE IN EXERCISE AT THIS LEVEL?: 0 MIN

## 2025-06-02 SDOH — HEALTH STABILITY: MENTAL HEALTH: HOW MANY DRINKS CONTAINING ALCOHOL DO YOU HAVE ON A TYPICAL DAY WHEN YOU ARE DRINKING?: PATIENT DOES NOT DRINK

## 2025-06-02 SDOH — HEALTH STABILITY: MENTAL HEALTH
DO YOU FEEL STRESS - TENSE, RESTLESS, NERVOUS, OR ANXIOUS, OR UNABLE TO SLEEP AT NIGHT BECAUSE YOUR MIND IS TROUBLED ALL THE TIME - THESE DAYS?: ONLY A LITTLE

## 2025-06-02 SDOH — SOCIAL STABILITY: SOCIAL NETWORK
IN A TYPICAL WEEK, HOW MANY TIMES DO YOU TALK ON THE PHONE WITH FAMILY, FRIENDS, OR NEIGHBORS?: MORE THAN THREE TIMES A WEEK

## 2025-06-02 SDOH — ECONOMIC STABILITY: FOOD INSECURITY: WITHIN THE PAST 12 MONTHS, YOU WORRIED THAT YOUR FOOD WOULD RUN OUT BEFORE YOU GOT THE MONEY TO BUY MORE.: NEVER TRUE

## 2025-06-02 SDOH — ECONOMIC STABILITY: TRANSPORTATION INSECURITY: IN THE PAST 12 MONTHS, HAS LACK OF TRANSPORTATION KEPT YOU FROM MEDICAL APPOINTMENTS OR FROM GETTING MEDICATIONS?: NO

## 2025-06-02 SDOH — SOCIAL STABILITY: SOCIAL NETWORK: HOW OFTEN DO YOU GET TOGETHER WITH FRIENDS OR RELATIVES?: MORE THAN THREE TIMES A WEEK

## 2025-06-02 ASSESSMENT — ACTIVITIES OF DAILY LIVING (ADL): LACK_OF_TRANSPORTATION: NO

## 2025-06-02 NOTE — CARE COORDINATION
Ambulatory Care Coordination Note     2025 9:37 AM     Patient Current Location:  Home: [address]  [place] [address]     ACM contacted the family by telephone. Verified name and  with family as identifiers.         ACM: Kae Zaragoza RN     Challenges to be reviewed by the provider   Additional needs identified to be addressed with provider Yes  Sent staff message-  spoke to daughter nash states his bp been decreasing here recently last checked 108/77 but thinks it goes lower. Wondering if any medication changes or also if the tamsulosin is causing this and the drowsy. Please advise.                Method of communication with provider: phone.    Utilization: Has the patient been seen in the ED since your last call? no    Care Summary Note: Spoke to nash states she was still wanting pcp advice on the bp drowsy question. Currently they are onsite for inr at [place]. Could not talk long. Sdoh reviewed. Sending [facility] chf,htn,fall prev. Sw to fu with family 25.  Plan:  Fu sw acp docs .  Med check needs    Offered patient enrollment in the Remote Patient Monitoring (RPM) program for in-home monitoring: Yes, but did not enroll at this time: limited patient ability to navigate RPM/equipment.     Assessments Completed:       2025     8:34 AM   Amb Fall Risk Assessment and TUG Test   Do you feel unsteady or are you worried about falling?  no   2 or more falls in past year? no   Fall with injury in past year? no    ,   Ambulatory Care Coordination Assessment    Care Coordination Protocol  Referral from Primary Care Provider: Yes  Week 1 - Initial Assessment     Do you have all of your prescriptions and are they filled?: Yes  Barriers to medication adherence: Forgets to take  Are you able to afford your medications?: Yes  How often do you have trouble taking your medications the way you have been told to take them?: I always take them as prescribed.     Do you have Home O2

## 2025-06-02 NOTE — CARE COORDINATION
Called daughter nash from pcp advice. Advised to monitor blood pressure, call with questions and if appointment is needed. Advised I would send to my chart the instructions as well.    Pcp message-Could be, we also decreased fludrocortisone at last visit.   If flomax is not helping with urinary symptoms we can stop it and see how blood pressure responds.   If it is not improvng, then would increase fludrocortisone to 1 and 1/2 tablet every other day.

## 2025-06-02 NOTE — PROGRESS NOTES
Leti Clinton is a 89 y.o. here for warfarin management.  Leti had an INR test today. Results were reviewed and appropriate warfarin management was completed.     Patient verifies current warfarin dosing regimen: Yes     Warfarin medication reviewed and updated on the patient 's home medication list: Yes   All other medications reviewed and updated on the patient 's home medication list: Yes     Lab Results   Component Value Date    INR 3.5 06/02/2025    INR 3.2 05/12/2025    INR 1.8 04/28/2025       Anticoagulation Summary  As of 6/2/2025      INR goal:  2.5-3.5   TTR:  61.5% (4.4 y)   INR used for dosing:  3.5 (6/2/2025)   Warfarin maintenance plan:  5 mg (10 mg x 0.5) every Sun; 10 mg (10 mg x 1) all other days   Weekly warfarin total:  65 mg   Plan last modified:  Mecca Holden RPH (4/28/2025)   Next INR check:  6/30/2025   Priority:  Maintenance   Target end date:  Indefinite    Indications    Anticoagulated on Coumadin [Z79.01]  Mechanical AVR 1991 [Z95.2]                 Anticoagulation Episode Summary       INR check location:  --    Preferred lab:  --    Send INR reminders to:  [facility]    Comments:  EPIC  Consent: 1/15/24  son = Ricardo          Anticoagulation Care Providers       Provider Role Specialty Phone number    Tereso Chavez DO Referring Family Medicine [phone]          There were no vitals taken for this visit.    Warfarin assessment / plan:     Patient appears well today.      No changes affecting warfarin therapy were noted.   No acute findings with regards to warfarin therapy.  INR today is within goal range.     Instructed to continue the same weekly warfarin dose.    Patient ate less greens since last visit  See in 4 weeks    Description    Take 1/2 tablet tomorrow then  Continue  Warfarin 10 mg (one tablet) daily except 5 mg every Sunday  Takes warfarin in the AM    Ensure 1 can daily (adds an extra 1-2 cans per week)   Eating 1-2 vitamin K green salad

## 2025-06-12 ENCOUNTER — CARE COORDINATION (OUTPATIENT)
Dept: CARE COORDINATION | Age: 89
End: 2025-06-12

## 2025-06-12 NOTE — CARE COORDINATION
Spouse is in for hospital dc next week and nash wanted ezekiel to be checked out too. Will schedule this now.

## 2025-06-17 ENCOUNTER — HOSPITAL ENCOUNTER (OUTPATIENT)
Age: 89
Discharge: HOME | End: 2025-06-17
Payer: MEDICARE

## 2025-06-17 ENCOUNTER — CARE COORDINATION (OUTPATIENT)
Dept: CARE COORDINATION | Age: 89
End: 2025-06-17

## 2025-06-17 ENCOUNTER — OFFICE VISIT (OUTPATIENT)
Dept: FAMILY MEDICINE CLINIC | Age: 89
End: 2025-06-17

## 2025-06-17 ENCOUNTER — HOSPITAL ENCOUNTER (OUTPATIENT)
Dept: GENERAL RADIOLOGY | Age: 89
Discharge: HOME | End: 2025-06-17
Attending: INTERNAL MEDICINE
Payer: MEDICARE

## 2025-06-17 VITALS
WEIGHT: 153 LBS | HEIGHT: 70 IN | BODY MASS INDEX: 21.9 KG/M2 | OXYGEN SATURATION: 95 % | SYSTOLIC BLOOD PRESSURE: 126 MMHG | DIASTOLIC BLOOD PRESSURE: 78 MMHG | RESPIRATION RATE: 16 BRPM | HEART RATE: 71 BPM

## 2025-06-17 DIAGNOSIS — M25.552 LEFT HIP PAIN: ICD-10-CM

## 2025-06-17 DIAGNOSIS — M25.552 LEFT HIP PAIN: Primary | ICD-10-CM

## 2025-06-17 DIAGNOSIS — R45.1 RESTLESS: ICD-10-CM

## 2025-06-17 PROCEDURE — 73502 X-RAY EXAM HIP UNI 2-3 VIEWS: CPT

## 2025-06-17 RX ORDER — OXYCODONE HYDROCHLORIDE 5 MG/1
5 TABLET ORAL EVERY 6 HOURS PRN
Qty: 28 TABLET | Refills: 0 | Status: SHIPPED | OUTPATIENT
Start: 2025-06-17 | End: 2025-06-24

## 2025-06-17 RX ORDER — GABAPENTIN 300 MG/1
300 CAPSULE ORAL NIGHTLY
Qty: 90 CAPSULE | Refills: 3 | Status: SHIPPED | OUTPATIENT
Start: 2025-06-17 | End: 2026-06-12

## 2025-06-17 RX ORDER — NALOXONE HYDROCHLORIDE 0.4 MG/ML
0.4 INJECTION, SOLUTION INTRAMUSCULAR; INTRAVENOUS; SUBCUTANEOUS PRN
Qty: 1 ML | Refills: 0 | Status: SHIPPED | OUTPATIENT
Start: 2025-06-17

## 2025-06-17 NOTE — CARE COORDINATION
Attempt to f/u on AD forms and dental resources. Unable to reach spouse. Spoke to daughter Marbella. She reported forms were received by her brother. No questions or assistance needed at this time.

## 2025-06-18 ENCOUNTER — CARE COORDINATION (OUTPATIENT)
Dept: CARE COORDINATION | Age: 89
End: 2025-06-18

## 2025-06-18 NOTE — PROGRESS NOTES
file.      Subjective:  HPI:   Chief Complaint   Patient presents with    Fall     Fell down 8 steps on June 5th and now he is complaining of hip pain .     Leg Problem     Jerking         History of Present Illness  The patient presents for evaluation of left hip pain and restless leg syndrome.    He has been experiencing persistent left hip pain since a fall. The pain, which radiates inward, is exacerbated by standing and walking. He reports no discomfort when lying on his left side. His primary concern is the severe pain experienced during ambulation and upon rising. He has not undergone any radiographic examination for this issue. He manages the pain with Tylenol and has attempted to use a lidocaine patch.    Additionally, he reports occasional leg jerking, which was particularly noticeable this morning around 4:00 AM, preventing him from returning to sleep. He also experiences hand tremors, which he distinguishes from the leg jerking. He describes an uncontrollable urge to move his legs, leading to restlessness and discomfort. This symptom has been present for approximately a year and is more bothersome at night. He does not experience any associated muscle spasms or pain. He is currently on carbidopa-levodopa therapy.         Review of Systems ROS negative unless otherwise specified in HPI.     The following portions of the patient's chart were reviewed in this encounter and updated as appropriate:  Reviewed and updated this visit by provider:         Objective:  /78 (BP Site: Left Upper Arm, Patient Position: Sitting, BP Cuff Size: Medium Adult)   Pulse 71   Resp 16   Ht 1.778 m (5' 10\")   Wt 69.4 kg (153 lb)   SpO2 95%   BMI 21.95 kg/m²      Physical Exam  Physical Exam  Musculoskeletal: Pain in the left hip exacerbated by walking and standing. No pain on passive range of motion or internal rotation. No tenderness on palpation of lateral hip. Using upper extremities to lift self out of chair as

## 2025-06-20 ENCOUNTER — CARE COORDINATION (OUTPATIENT)
Dept: CARE COORDINATION | Age: 89
End: 2025-06-20

## 2025-06-20 NOTE — CARE COORDINATION
Attempted to initiate SW referral for AL. LVM.     Incoming call back from Ricardo. He was not aware of AL referral or pt/spouse interest in going to AL. He indicated that it has always been his parents decision to remain in their home and not pay for LTC. He reported that they have someone to assist with errands and housekeeping and has life alert. Ricardo stated that they have an advisor that he speaks with regarding senior concern and resources. Ricardo will speak with parents and advisor regarding AL.

## 2025-06-24 ENCOUNTER — RESULTS FOLLOW-UP (OUTPATIENT)
Dept: FAMILY MEDICINE CLINIC | Age: 89
End: 2025-06-24

## 2025-06-24 ENCOUNTER — TELEPHONE (OUTPATIENT)
Dept: PHARMACY | Age: 89
End: 2025-06-24

## 2025-06-24 DIAGNOSIS — Z79.01 ANTICOAGULATED ON COUMADIN: ICD-10-CM

## 2025-06-24 DIAGNOSIS — Z95.2 HX OF ARTIFICIAL HEART VALVE REPLACEMENT: Primary | ICD-10-CM

## 2025-06-24 NOTE — TELEPHONE ENCOUNTER
Referral renewal request sent to Dr Toribio for anticoagulation management. Dr Chavez no longer see's this pt.

## 2025-06-27 ENCOUNTER — OFFICE VISIT (OUTPATIENT)
Dept: ORTHOPEDIC SURGERY | Age: 89
End: 2025-06-27
Payer: MEDICARE

## 2025-06-27 VITALS — HEIGHT: 70 IN | WEIGHT: 153 LBS | BODY MASS INDEX: 21.9 KG/M2

## 2025-06-27 DIAGNOSIS — R10.2 PELVIC PAIN: Primary | ICD-10-CM

## 2025-06-27 DIAGNOSIS — M51.360 DEGENERATION OF INTERVERTEBRAL DISC OF LUMBAR REGION WITH DISCOGENIC BACK PAIN: ICD-10-CM

## 2025-06-27 PROCEDURE — 1123F ACP DISCUSS/DSCN MKR DOCD: CPT | Performed by: ORTHOPAEDIC SURGERY

## 2025-06-27 PROCEDURE — 1125F AMNT PAIN NOTED PAIN PRSNT: CPT | Performed by: ORTHOPAEDIC SURGERY

## 2025-06-27 PROCEDURE — 1159F MED LIST DOCD IN RCRD: CPT | Performed by: ORTHOPAEDIC SURGERY

## 2025-06-27 PROCEDURE — 1160F RVW MEDS BY RX/DR IN RCRD: CPT | Performed by: ORTHOPAEDIC SURGERY

## 2025-06-27 PROCEDURE — 99203 OFFICE O/P NEW LOW 30 MIN: CPT | Performed by: ORTHOPAEDIC SURGERY

## 2025-06-27 RX ORDER — METHYLPREDNISOLONE 4 MG/1
TABLET ORAL
Qty: 1 KIT | Refills: 0 | Status: SHIPPED | OUTPATIENT
Start: 2025-06-27

## 2025-06-27 NOTE — PROGRESS NOTES
Patient is seen at the request of Eulalia Reagan MD    Subjective     Patient ID: Lebron and treatment options.  I doubt he has a left hip fracture as most of his pain is posterior.  However, I recommended obtaining an MRI (or a CT if an MRI is not possible) to evaluate for a pelvic ring injury including a sacral fracture.  He is limited in what pain medication he can take.  A Medrol Dosepak was prescribed today to help with acute pain.  If there are no fractures, the pain is likely an exacerbation of his existing lumbar spine degenerative disc disease.  He will follow-up here after the MRI to review the results and discuss next steps of care.    Total time spent on today's encounter was at least 31 minutes. This time included reviewing prior notes, radiographs, and lab results when available, reviewing history obtained by medical assistant, performing history and physical exam, reviewing tests/radiographs with the patient, counseling the patient, ordering medications or tests, documentation in the electronic health record, and coordination of care. This time excludes any separately billed procedures.   -----------------------------------------------------------    Electronically signed by Shelby Funes Mi Gil MD, 1/10/2024

## 2025-06-30 ENCOUNTER — TELEPHONE (OUTPATIENT)
Dept: ORTHOPEDIC SURGERY | Age: 89
End: 2025-06-30

## 2025-06-30 ENCOUNTER — HOSPITAL ENCOUNTER (INPATIENT)
Age: 89
LOS: 3 days | Discharge: NURSING, REHAB OR LONG-TERM CARE | DRG: 543 | End: 2025-07-03
Attending: HOSPITALIST | Admitting: HOSPITALIST
Payer: MEDICARE

## 2025-06-30 ENCOUNTER — ANTI-COAG VISIT (OUTPATIENT)
Dept: PHARMACY | Age: 89
End: 2025-06-30
Payer: MEDICARE

## 2025-06-30 ENCOUNTER — APPOINTMENT (OUTPATIENT)
Dept: CT IMAGING | Age: 89
DRG: 543 | End: 2025-06-30
Payer: MEDICARE

## 2025-06-30 DIAGNOSIS — M25.551 ACUTE HIP PAIN, RIGHT: Primary | ICD-10-CM

## 2025-06-30 DIAGNOSIS — M25.551 HIP PAIN, ACUTE, RIGHT: ICD-10-CM

## 2025-06-30 DIAGNOSIS — Z95.2 HX OF ARTIFICIAL HEART VALVE REPLACEMENT: ICD-10-CM

## 2025-06-30 DIAGNOSIS — R26.2 INABILITY TO AMBULATE DUE TO HIP: ICD-10-CM

## 2025-06-30 DIAGNOSIS — Z79.01 ANTICOAGULATED ON COUMADIN: Primary | ICD-10-CM

## 2025-06-30 LAB
ANION GAP SERPL CALCULATED.3IONS-SCNC: 11 MMOL/L (ref 3–16)
BASOPHILS # BLD: 0 K/UL (ref 0–0.2)
BASOPHILS NFR BLD: 0.2 %
BUN SERPL-MCNC: 29 MG/DL (ref 7–20)
CALCIUM SERPL-MCNC: 9.4 MG/DL (ref 8.3–10.6)
CHLORIDE SERPL-SCNC: 102 MMOL/L (ref 99–110)
CO2 SERPL-SCNC: 25 MMOL/L (ref 21–32)
CREAT SERPL-MCNC: 0.7 MG/DL (ref 0.8–1.3)
DEPRECATED RDW RBC AUTO: 16.7 % (ref 12.4–15.4)
EOSINOPHIL # BLD: 0 K/UL (ref 0–0.6)
EOSINOPHIL NFR BLD: 0 %
GFR SERPLBLD CREATININE-BSD FMLA CKD-EPI: 87 ML/MIN/{1.73_M2}
GLUCOSE SERPL-MCNC: 116 MG/DL (ref 70–99)
HCT VFR BLD AUTO: 34.7 % (ref 40.5–52.5)
HGB BLD-MCNC: 11.3 G/DL (ref 13.5–17.5)
INTERNATIONAL NORMALIZATION RATIO, POC: 6.8
LYMPHOCYTES # BLD: 0.4 K/UL (ref 1–5.1)
LYMPHOCYTES NFR BLD: 4.3 %
MCH RBC QN AUTO: 29.4 PG (ref 26–34)
MCHC RBC AUTO-ENTMCNC: 32.7 G/DL (ref 31–36)
MCV RBC AUTO: 89.9 FL (ref 80–100)
MONOCYTES # BLD: 0.3 K/UL (ref 0–1.3)
MONOCYTES NFR BLD: 3.8 %
NEUTROPHILS # BLD: 8.3 K/UL (ref 1.7–7.7)
NEUTROPHILS NFR BLD: 91.7 %
PLATELET # BLD AUTO: 274 K/UL (ref 135–450)
PMV BLD AUTO: 8.4 FL (ref 5–10.5)
POTASSIUM SERPL-SCNC: 4.1 MMOL/L (ref 3.5–5.1)
PROTHROMBIN TIME, POC: 0
RBC # BLD AUTO: 3.85 M/UL (ref 4.2–5.9)
SODIUM SERPL-SCNC: 138 MMOL/L (ref 136–145)
WBC # BLD AUTO: 9 K/UL (ref 4–11)

## 2025-06-30 PROCEDURE — 99285 EMERGENCY DEPT VISIT HI MDM: CPT

## 2025-06-30 PROCEDURE — 6370000000 HC RX 637 (ALT 250 FOR IP): Performed by: HOSPITALIST

## 2025-06-30 PROCEDURE — 99211 OFF/OP EST MAY X REQ PHY/QHP: CPT

## 2025-06-30 PROCEDURE — 6370000000 HC RX 637 (ALT 250 FOR IP): Performed by: NURSE PRACTITIONER

## 2025-06-30 PROCEDURE — 85025 COMPLETE CBC W/AUTO DIFF WBC: CPT

## 2025-06-30 PROCEDURE — 36415 COLL VENOUS BLD VENIPUNCTURE: CPT

## 2025-06-30 PROCEDURE — 2580000003 HC RX 258: Performed by: HOSPITALIST

## 2025-06-30 PROCEDURE — 6370000000 HC RX 637 (ALT 250 FOR IP): Performed by: PHYSICIAN ASSISTANT

## 2025-06-30 PROCEDURE — 1200000000 HC SEMI PRIVATE

## 2025-06-30 PROCEDURE — 80048 BASIC METABOLIC PNL TOTAL CA: CPT

## 2025-06-30 PROCEDURE — 85610 PROTHROMBIN TIME: CPT

## 2025-06-30 PROCEDURE — 73700 CT LOWER EXTREMITY W/O DYE: CPT

## 2025-06-30 RX ORDER — OXYCODONE AND ACETAMINOPHEN 5; 325 MG/1; MG/1
1 TABLET ORAL EVERY 4 HOURS PRN
Status: DISCONTINUED | OUTPATIENT
Start: 2025-06-30 | End: 2025-07-03 | Stop reason: HOSPADM

## 2025-06-30 RX ORDER — FLUDROCORTISONE ACETATE 0.1 MG/1
0.5 TABLET ORAL DAILY
Status: DISCONTINUED | OUTPATIENT
Start: 2025-07-01 | End: 2025-06-30

## 2025-06-30 RX ORDER — HYDROXYZINE PAMOATE 25 MG/1
25 CAPSULE ORAL ONCE
Status: COMPLETED | OUTPATIENT
Start: 2025-06-30 | End: 2025-06-30

## 2025-06-30 RX ORDER — CARBIDOPA AND LEVODOPA 50; 200 MG/1; MG/1
1 TABLET, EXTENDED RELEASE ORAL NIGHTLY
Status: DISCONTINUED | OUTPATIENT
Start: 2025-06-30 | End: 2025-07-03 | Stop reason: HOSPADM

## 2025-06-30 RX ORDER — TAMSULOSIN HYDROCHLORIDE 0.4 MG/1
0.4 CAPSULE ORAL DAILY
Status: DISCONTINUED | OUTPATIENT
Start: 2025-07-01 | End: 2025-07-03 | Stop reason: HOSPADM

## 2025-06-30 RX ORDER — SODIUM CHLORIDE 0.9 % (FLUSH) 0.9 %
5-40 SYRINGE (ML) INJECTION EVERY 12 HOURS SCHEDULED
Status: DISCONTINUED | OUTPATIENT
Start: 2025-06-30 | End: 2025-07-03 | Stop reason: HOSPADM

## 2025-06-30 RX ORDER — ASPIRIN 81 MG/1
81 TABLET, CHEWABLE ORAL DAILY
Status: DISCONTINUED | OUTPATIENT
Start: 2025-07-01 | End: 2025-07-03 | Stop reason: HOSPADM

## 2025-06-30 RX ORDER — ONDANSETRON 2 MG/ML
4 INJECTION INTRAMUSCULAR; INTRAVENOUS EVERY 6 HOURS PRN
Status: DISCONTINUED | OUTPATIENT
Start: 2025-06-30 | End: 2025-07-03 | Stop reason: HOSPADM

## 2025-06-30 RX ORDER — POLYETHYLENE GLYCOL 3350 17 G/17G
17 POWDER, FOR SOLUTION ORAL DAILY PRN
Status: DISCONTINUED | OUTPATIENT
Start: 2025-06-30 | End: 2025-07-03 | Stop reason: HOSPADM

## 2025-06-30 RX ORDER — SODIUM CHLORIDE 9 MG/ML
INJECTION, SOLUTION INTRAVENOUS PRN
Status: DISCONTINUED | OUTPATIENT
Start: 2025-06-30 | End: 2025-07-03 | Stop reason: HOSPADM

## 2025-06-30 RX ORDER — POTASSIUM CHLORIDE 1500 MG/1
40 TABLET, EXTENDED RELEASE ORAL PRN
Status: DISCONTINUED | OUTPATIENT
Start: 2025-06-30 | End: 2025-07-03 | Stop reason: HOSPADM

## 2025-06-30 RX ORDER — METHYLPREDNISOLONE 4 MG/1
4 TABLET ORAL DAILY
Status: COMPLETED | OUTPATIENT
Start: 2025-07-01 | End: 2025-07-01

## 2025-06-30 RX ORDER — OXYCODONE AND ACETAMINOPHEN 5; 325 MG/1; MG/1
1 TABLET ORAL ONCE
Refills: 0 | Status: COMPLETED | OUTPATIENT
Start: 2025-06-30 | End: 2025-06-30

## 2025-06-30 RX ORDER — CARBIDOPA AND LEVODOPA 50; 200 MG/1; MG/1
1 TABLET, EXTENDED RELEASE ORAL NIGHTLY
COMMUNITY

## 2025-06-30 RX ORDER — POTASSIUM CHLORIDE 7.45 MG/ML
10 INJECTION INTRAVENOUS PRN
Status: DISCONTINUED | OUTPATIENT
Start: 2025-06-30 | End: 2025-07-03 | Stop reason: HOSPADM

## 2025-06-30 RX ORDER — ONDANSETRON 4 MG/1
4 TABLET, ORALLY DISINTEGRATING ORAL EVERY 8 HOURS PRN
Status: DISCONTINUED | OUTPATIENT
Start: 2025-06-30 | End: 2025-07-03 | Stop reason: HOSPADM

## 2025-06-30 RX ORDER — FLUDROCORTISONE ACETATE 0.1 MG/1
0.05 TABLET ORAL DAILY
Status: DISCONTINUED | OUTPATIENT
Start: 2025-07-01 | End: 2025-07-03 | Stop reason: HOSPADM

## 2025-06-30 RX ORDER — ACETAMINOPHEN 650 MG/1
650 SUPPOSITORY RECTAL EVERY 6 HOURS PRN
Status: DISCONTINUED | OUTPATIENT
Start: 2025-06-30 | End: 2025-07-03 | Stop reason: HOSPADM

## 2025-06-30 RX ORDER — SELEGILINE HYDROCHLORIDE 5 MG/1
5 TABLET ORAL 2 TIMES DAILY
Status: DISCONTINUED | OUTPATIENT
Start: 2025-06-30 | End: 2025-07-03 | Stop reason: HOSPADM

## 2025-06-30 RX ORDER — ACETAMINOPHEN 325 MG/1
650 TABLET ORAL EVERY 6 HOURS PRN
Status: DISCONTINUED | OUTPATIENT
Start: 2025-06-30 | End: 2025-07-03 | Stop reason: HOSPADM

## 2025-06-30 RX ORDER — SODIUM CHLORIDE 9 MG/ML
INJECTION, SOLUTION INTRAVENOUS CONTINUOUS
Status: ACTIVE | OUTPATIENT
Start: 2025-06-30 | End: 2025-07-01

## 2025-06-30 RX ORDER — MAGNESIUM SULFATE IN WATER 40 MG/ML
2000 INJECTION, SOLUTION INTRAVENOUS PRN
Status: DISCONTINUED | OUTPATIENT
Start: 2025-06-30 | End: 2025-07-03 | Stop reason: HOSPADM

## 2025-06-30 RX ORDER — GABAPENTIN 300 MG/1
300 CAPSULE ORAL NIGHTLY
Status: DISCONTINUED | OUTPATIENT
Start: 2025-06-30 | End: 2025-07-03 | Stop reason: HOSPADM

## 2025-06-30 RX ORDER — DEXAMETHASONE 4 MG/1
4 TABLET ORAL DAILY
Status: DISCONTINUED | OUTPATIENT
Start: 2025-06-30 | End: 2025-06-30

## 2025-06-30 RX ORDER — SODIUM CHLORIDE 0.9 % (FLUSH) 0.9 %
5-40 SYRINGE (ML) INJECTION PRN
Status: DISCONTINUED | OUTPATIENT
Start: 2025-06-30 | End: 2025-07-03 | Stop reason: HOSPADM

## 2025-06-30 RX ORDER — WARFARIN SODIUM 5 MG/1
10 TABLET ORAL DAILY
Status: DISCONTINUED | OUTPATIENT
Start: 2025-07-01 | End: 2025-06-30

## 2025-06-30 RX ORDER — METHYLPREDNISOLONE 4 MG/1
4 TABLET ORAL DAILY
Status: DISCONTINUED | OUTPATIENT
Start: 2025-07-01 | End: 2025-06-30

## 2025-06-30 RX ORDER — CARBIDOPA AND LEVODOPA 25; 100 MG/1; MG/1
1.5 TABLET ORAL 4 TIMES DAILY
Status: DISCONTINUED | OUTPATIENT
Start: 2025-06-30 | End: 2025-07-03 | Stop reason: HOSPADM

## 2025-06-30 RX ADMIN — SODIUM CHLORIDE: 0.9 INJECTION, SOLUTION INTRAVENOUS at 17:58

## 2025-06-30 RX ADMIN — HYDROXYZINE PAMOATE 25 MG: 25 CAPSULE ORAL at 21:51

## 2025-06-30 RX ADMIN — OXYCODONE AND ACETAMINOPHEN 1 TABLET: 5; 325 TABLET ORAL at 13:20

## 2025-06-30 RX ADMIN — GABAPENTIN 300 MG: 300 CAPSULE ORAL at 20:00

## 2025-06-30 RX ADMIN — SELEGILINE HYDROCHLORIDE 5 MG: 5 TABLET ORAL at 20:00

## 2025-06-30 RX ADMIN — CARBIDOPA AND LEVODOPA 1 TABLET: 50; 200 TABLET, EXTENDED RELEASE ORAL at 20:00

## 2025-06-30 RX ADMIN — CARBIDOPA AND LEVODOPA 1.5 TABLET: 25; 100 TABLET ORAL at 20:03

## 2025-06-30 ASSESSMENT — ENCOUNTER SYMPTOMS
EYE PAIN: 0
SHORTNESS OF BREATH: 0
ABDOMINAL PAIN: 0
COUGH: 0
SORE THROAT: 0
NAUSEA: 0
VOMITING: 0
BACK PAIN: 0

## 2025-06-30 ASSESSMENT — PAIN DESCRIPTION - LOCATION: LOCATION: HIP

## 2025-06-30 ASSESSMENT — PAIN - FUNCTIONAL ASSESSMENT: PAIN_FUNCTIONAL_ASSESSMENT: PREVENTS OR INTERFERES SOME ACTIVE ACTIVITIES AND ADLS

## 2025-06-30 ASSESSMENT — PAIN DESCRIPTION - FREQUENCY: FREQUENCY: INTERMITTENT

## 2025-06-30 ASSESSMENT — PAIN SCALES - GENERAL: PAINLEVEL_OUTOF10: 0

## 2025-06-30 ASSESSMENT — PAIN DESCRIPTION - ONSET: ONSET: ON-GOING

## 2025-06-30 ASSESSMENT — PAIN DESCRIPTION - ORIENTATION: ORIENTATION: RIGHT;LEFT

## 2025-06-30 ASSESSMENT — PAIN DESCRIPTION - PAIN TYPE: TYPE: ACUTE PAIN

## 2025-06-30 ASSESSMENT — PAIN DESCRIPTION - DESCRIPTORS: DESCRIPTORS: ACHING

## 2025-06-30 NOTE — PLAN OF CARE
Problem: Chronic Conditions and Co-morbidities  Goal: Patient's chronic conditions and co-morbidity symptoms are monitored and maintained or improved  Outcome: Progressing  Flowsheets (Taken 6/30/2025 1715)  Care Plan - Patient's Chronic Conditions and Co-Morbidity Symptoms are Monitored and Maintained or Improved:   Monitor and assess patient's chronic conditions and comorbid symptoms for stability, deterioration, or improvement   Collaborate with multidisciplinary team to address chronic and comorbid conditions and prevent exacerbation or deterioration   Update acute care plan with appropriate goals if chronic or comorbid symptoms are exacerbated and prevent overall improvement and discharge     Problem: Discharge Planning  Goal: Discharge to home or other facility with appropriate resources  Outcome: Progressing  Flowsheets (Taken 6/30/2025 1715)  Discharge to home or other facility with appropriate resources:   Identify barriers to discharge with patient and caregiver   Identify discharge learning needs (meds, wound care, etc)   Arrange for needed discharge resources and transportation as appropriate   Refer to discharge planning if patient needs post-hospital services based on physician order or complex needs related to functional status, cognitive ability or social support system     Problem: ABCDS Injury Assessment  Goal: Absence of physical injury  Outcome: Progressing  Flowsheets (Taken 6/30/2025 1715)  Absence of Physical Injury: Implement safety measures based on patient assessment

## 2025-06-30 NOTE — CONSULTS
Clinical Pharmacy Note  Warfarin Consult    Leti Clinton is a 89 y.o. male receiving warfarin managed by pharmacy.  Patient being bridged with none.    Warfarin Indication: Hx mechanical AVR    Target INR range: 2.5-3.5   Dose prior to admission: 5 mg on SUN and 10 mg ALL OTHER DAYS     Current warfarin drug-drug interactions: none new since admission    Recent Labs     06/30/25  0947 06/30/25  1344   HGB  --  11.3*   HCT  --  34.7*   INR 6.8  --        Assessment/Plan:    Warfarin 10 mg already taken today pta. Daily PT/INR until stable within therapeutic range.     Thank you for the consult.  Will continue to follow.     Celsa Palomares [facility], PharmD 6/30/2025 5:34 PM

## 2025-06-30 NOTE — PROGRESS NOTES
Educated patient on purpose of 4 eyes skin assessment and asked patient if allowed to perform, patient verbalized understanding and agreed to assessment.    4 Eyes Skin Assessment     The patient is being assess for  Admission    I agree that 2 RN's have performed a thorough Head to Toe Skin Assessment on the patient. ALL assessment sites listed below have been assessed.       Areas assessed by both nurses: SANTIAGO Garcia & SANTIAGO Zuluaga  [x]   Head, Face, and Ears   [x]   Shoulders, Back, and Chest  [x]   Arms, Elbows, and Hands   [x]   Coccyx, Sacrum, and IschIum  [x]   Legs, Feet, and Heels        Does the Patient have Skin Breakdown?  No         Dimitry Prevention initiated:  Yes   Wound Care Orders initiated:  No      WOC nurse consulted for Pressure Injury (Stage 3,4, Unstageable, DTI, NWPT, and Complex wounds), New and Established Ostomies:  No      Nurse 1 eSignature: Electronically signed by Radha Whittington RN on 6/30/25 at 5:34 PM EDT    **SHARE this note so that the co-signing nurse is able to place an eSignature**    Nurse 2 eSignature: Electronically signed by Zan Spears RN on 6/30/25 at 5:37 PM EDT

## 2025-06-30 NOTE — H&P
V2.0  History and Physical      Name:  Leti Clinton /Age/Sex: 1934  (89 y.o. male)   MRN & CSN:  [id number] & [id number] Encounter Date/Time: 2025 3:24 PM EDT   Location:  [address] [place] [address] PCP: Kristopher Toribio MD       Hospital Day: 1  History from:   patient, daughter  History of Present Illness:   Chief Complaint: Right hip    Leti Clinton is a 89 y.o. male with a past medical history significant for mechanical aortic valve, on anticoagulation Parkinson's, he also has hip pain going on for some time, was seen by Dr. Rojas couple of days ago as outpatient, daughter tells me that the possible plans of getting an MRI, patient also had a fall earlier in , unfortunately patient at this time is unable to get around even with the assistance of a walker, few weeks ago, patient was getting around without any walker.  Workup in the ED included CT scan which reveals bilateral hip arthritis, no acute fracture or dislocation, labs are benign besides an elevated INR which was done earlier today, it is 6.8.  Patient having mobility issues and hence will be admitted for further evaluation and management     Review of Systems:    Pertinent positives and negatives discussed in HPI   Objective:   No intake or output data in the 24 hours ending 25 1524   Vitals:   Vitals:    25 1033 25 1037 25 1513   BP: 128/70  121/79   Pulse: 66  68   Resp: 18  16   Temp: 97.4 °F (36.3 °C)     TempSrc: Oral     SpO2: 100%  96%   Weight:  68.9 kg (151 lb 14.4 oz)        Past Medical History:     PMHx   Past Medical History:   Diagnosis Date    Colon polyps     Mechanical heart valve present     Parkinson disease (HCC)     S/P aortic valve replacement with metallic valve          PSHX:  has a past surgical history that includes Aortic valve replacement (); Colonoscopy (); Colonoscopy (2012); eye surgery (2024); Colonoscopy (); Diagnostic Cardiac Cath Lab Procedure (2022); Cataract

## 2025-06-30 NOTE — ED PROVIDER NOTES
**ADVANCED PRACTICE PROVIDER, I HAVE EVALUATED THIS PATIENT**        [facility] EMERGENCY DEPARTMENT  EMERGENCY DEPARTMENT ENCOUNTER      Pt Name: Leti Clinton  MRN:[id number]  Birthdate 2/19/1934  Date of evaluation: 6/30/2025  Provider: Yordy Jolly PA-C  Note Started: 12:27 PM EDT 6/30/25        Chief Complaint:    Chief Complaint   Patient presents with    Fall     Pt had a mechanical fall on 6/5, had imaging done through PCP. Pt brought in by daughter with reports that his left hip first hurt and now right hip hurts. Pt is scheduled for MRI but unclear if he can get one d/t a mechanical heart valve. Pt having difficulty walking         Nursing Notes, Past Medical Hx, Past Surgical Hx, Social Hx, Allergies, and Family Hx were all reviewed and agreed with or any disagreements were addressed in the HPI.    HPI: (Location, Duration, Timing, Severity, Quality, Assoc Sx, Context, Modifying factors)    History From: Patient  Limitations to history : None        Chief Complaint of right hip pain    This is a  89 y.o. male who presents to ED with complaints of right hip pain. Patient reports a mechanical fall on 6/5 with associated left hip pain and XR done on 6/17 through PCP that showed no acute abnormality with mild symmetric bilateral hip osteoarthritis. Patient saw Dr. Rojas on 6/27 who recommended an MRI to evaluate for pelvic or sacral fracture and prescribed a Medrol dose pack to help with pain. Patient now complains of right hip pain with no known injury. He has been unable to bear weight on right hip which prompted him coming to ED. Denies any radiation of pain, numbness, or tingling in lower extremity. Patient reports taking tylenol and oxycodone this morning around 7am for the pain with no relief. Denies any chest pain, SOB, headaches, abdominal pain, bowel or urinary incontinence.      PastMedical/Surgical History:      Diagnosis Date    Colon polyps     Mechanical heart valve present      Negative for chest pain and leg swelling.   Gastrointestinal:  Negative for abdominal pain, nausea and vomiting.   Genitourinary:  Negative for dysuria and frequency.   Musculoskeletal:  Negative for back pain and neck pain.   Skin:  Negative for rash and wound.   Neurological:  Negative for dizziness and light-headedness.       \"Positives and Pertinent negatives as per HPI\"    Physical Exam:  Physical Exam  Vitals and nursing note reviewed.   Constitutional:       General: He is not in acute distress.     Appearance: Normal appearance. He is normal weight. He is not ill-appearing, toxic-appearing or diaphoretic.   Cardiovascular:      Rate and Rhythm: Normal rate and regular rhythm.      Heart sounds: Normal heart sounds. No murmur heard.     No friction rub. No gallop.   Pulmonary:      Effort: Pulmonary effort is normal. No respiratory distress.      Breath sounds: Normal breath sounds. No wheezing or rales.   Chest:      Chest wall: No tenderness.   Musculoskeletal:         General: No swelling, deformity or signs of injury.      Right hip: Tenderness and bony tenderness present. No deformity or crepitus. Decreased range of motion. Decreased strength.      Right upper leg: Normal.      Right lower leg: No edema.      Comments: Pain associated with internal and external rotation of right hip. Right hip pain associated with right and left straight leg raise.    Skin:     General: Skin is warm.   Neurological:      General: No focal deficit present.      Mental Status: He is alert.         MEDICAL DECISION MAKING    Vitals:    Vitals:    06/30/25 1033 06/30/25 1037   BP: 128/70    Pulse: 66    Resp: 18    Temp: 97.4 °F (36.3 °C)    TempSrc: Oral    SpO2: 100%    Weight:  68.9 kg (151 lb 14.4 oz)       LABS:  Labs Reviewed   CBC WITH AUTO DIFFERENTIAL - Abnormal; Notable for the following components:       Result Value    RBC 3.85 (*)     Hemoglobin 11.3 (*)     Hematocrit 34.7 (*)     RDW 16.7 (*)     Neutrophils

## 2025-06-30 NOTE — PROGRESS NOTES
daily except 5 mg every Sunday  Takes warfarin in the AM    Ensure 1 can daily (adds an extra 1-2 cans per week)   Eating 1-2 vitamin K green salad (green leaf or jeniffer) per week    Call [phone] with signs or symptoms of bleeding or ANY medication changes (including over-the-counter medications or herbal supplements).       If significant bleeding occurs please seek immediate medical attention.    Keep the number of servings of vitamin K containing foods (dark green, leafy vegetables) the same each week. Please call if this changes. Salad every other day and a green vegetable most days of the week.  He does not eat dark green leafy vegetables.     Limit alcohol intake. Please call if this changes.        Immunization History   Administered Date(s) Administered    COVID-19, PFIZER Bivalent, DO NOT Dilute, (age 12y+), IM, 30 mcg/0.3 mL 10/13/2022    COVID-19, PFIZER PURPLE top, DILUTE for use, (age 12 y+), 30mcg/0.3mL 01/20/2021, 02/10/2021, 10/06/2021    COVID-19, PFIZER, 2024/25, (age 12y+), IM, 30mcg/0.3mL 01/02/2025    Influenza, AFLURIA (age 3 y+), FLUZONE, (age 6 mo+), Quadv MDV, 0.5mL 11/03/2017    Influenza, FLUAD, (age 65 y+), IM, Quadv, 0.5mL 10/06/2021, 10/13/2022, 10/12/2023    Influenza, FLUAD, (age 65 y+), IM, Trivalent PF, 0.5mL 10/14/2024    Influenza, FLUARIX, FLULAVAL, FLUZONE (age 6 mo+) and AFLURIA, (age 3 y+), Quadv PF, 0.5mL 09/25/2020    Influenza, FLUZONE High Dose (age 65 y+), IM, Quadv, 0.7mL 11/26/2023    Influenza, FLUZONE High Dose, (age 65 y+), IM, Trivalent PF, 0.5mL 10/11/2013, 10/24/2014, 11/03/2015, 10/21/2016, 10/05/2018, 09/19/2019, 09/25/2020    Pneumococcal, PCV-13, PREVNAR 13, (age 6w+), IM, 0.5mL 01/18/2016    Pneumococcal, PPSV23, PNEUMOVAX 23, (age 2y+), SC/IM, 0.5mL 10/01/2008, 12/31/2014    RSV, AREXVY, (age 60y+), PF, IM, 0.5mL 02/15/2024    TDaP, ADACEL (age 10y-64y), BOOSTRIX (age 10y+), IM, 0.5mL 11/24/2023    Zoster Recombinant (Shingrix) 11/22/2019, 09/22/2020

## 2025-06-30 NOTE — ED NOTES
ED TO INPATIENT SBAR HANDOFF    Patient Name: Leti Clinton   Preferred Name: Leti  : 1934  89 y.o.   Family/Caregiver Present: no   Code Status Order: Prior  PO Status: NPO:No  Telemetry Order: No  C-SSRS: Risk of Suicide: No Risk  Sitter no   Restraints:     Sepsis Risk Score Sepsis V2 Risk Score: 8.5    Situation  Chief Complaint   Patient presents with    Fall     Pt had a mechanical fall on , had imaging done through PCP. Pt brought in by daughter with reports that his left hip first hurt and now right hip hurts. Pt is scheduled for MRI but unclear if he can get one d/t a mechanical heart valve. Pt having difficulty walking     Brief Description of Patient's Condition: Pt resting in bed eating and talking to family.   Mental Status: oriented, alert, coherent, logical, thought processes intact, and able to concentrate and follow conversation  Arrived from:Home  Imaging:   CT HIP RIGHT WO CONTRAST   Final Result   1. Mild bilateral hip osteoarthritis.   2. Advanced degeneration of the spine.   3. No acute fracture or dislocation.   4. Recommend MRI of the hip to further characterize.           Abnormal labs:   Abnormal Labs Reviewed   CBC WITH AUTO DIFFERENTIAL - Abnormal; Notable for the following components:       Result Value    RBC 3.85 (*)     Hemoglobin 11.3 (*)     Hematocrit 34.7 (*)     RDW 16.7 (*)     Neutrophils Absolute 8.3 (*)     Lymphocytes Absolute 0.4 (*)     All other components within normal limits   BASIC METABOLIC PANEL - Abnormal; Notable for the following components:    Glucose 116 (*)     BUN 29 (*)     Creatinine 0.7 (*)     All other components within normal limits       Background  Allergies:   Allergies   Allergen Reactions    Amlodipine      Caused Dizziness    Other reaction(s): dizziness per H&P    Codeine Nausea Only     derivatives     History:   Past Medical History:   Diagnosis Date    Colon polyps     Mechanical heart valve present     Parkinson disease (HCC)

## 2025-06-30 NOTE — PROGRESS NOTES
Patient was admitted via stretcher from ER. Patient was conscious, coherent and VSS. Right arm PIV Line flushed and no complications noted. Head-to-toe assessment done. Oriented to room. Rights and responsibilities were explained to the patient. Skin assessment done.

## 2025-06-30 NOTE — PROGRESS NOTES
Medication Reconciliation    List of medications patient is currently taking is complete.     Source of information: 1. Conversation with patient/daughter at bedside                                      2. EPIC records     Notes regarding home medications:   1. Patient received all of his morning home medications prior to arrival to the ER today.  2. Patient is anticoagulated with Warfarin:      [Indication: Hx mechanical AVR, Target INR: 2.5-3.5, Current dose: 5 mg on SUN and 10 mg ALL OTHER DAYS] - Patient POC IN[facility] today [facility] - patient did already take his Warfarin 10 mg dose prior to his Coumadin Clinic Appointment today.  3. Patient's Sinemet regimen is Sinemet 25/100 1.5 tablets po QID and Sinemet CR 50/200 1 po QHS.      Uday Dale [facility], PharmD, BCPS  6/30/2025 3:53 PM

## 2025-07-01 ENCOUNTER — APPOINTMENT (OUTPATIENT)
Dept: MRI IMAGING | Age: 89
DRG: 543 | End: 2025-07-01
Payer: MEDICARE

## 2025-07-01 LAB
ANION GAP SERPL CALCULATED.3IONS-SCNC: 8 MMOL/L (ref 3–16)
BASOPHILS # BLD: 0 K/UL (ref 0–0.2)
BASOPHILS NFR BLD: 0.2 %
BUN SERPL-MCNC: 23 MG/DL (ref 7–20)
CALCIUM SERPL-MCNC: 9.3 MG/DL (ref 8.3–10.6)
CHLORIDE SERPL-SCNC: 106 MMOL/L (ref 99–110)
CO2 SERPL-SCNC: 27 MMOL/L (ref 21–32)
CREAT SERPL-MCNC: 0.7 MG/DL (ref 0.8–1.3)
DEPRECATED RDW RBC AUTO: 16.3 % (ref 12.4–15.4)
EOSINOPHIL # BLD: 0 K/UL (ref 0–0.6)
EOSINOPHIL NFR BLD: 0 %
GFR SERPLBLD CREATININE-BSD FMLA CKD-EPI: 87 ML/MIN/{1.73_M2}
GLUCOSE SERPL-MCNC: 85 MG/DL (ref 70–99)
HCT VFR BLD AUTO: 35.2 % (ref 40.5–52.5)
HGB BLD-MCNC: 11.9 G/DL (ref 13.5–17.5)
INR PPP: 7.23 (ref 0.86–1.14)
LYMPHOCYTES # BLD: 1.3 K/UL (ref 1–5.1)
LYMPHOCYTES NFR BLD: 16.5 %
MCH RBC QN AUTO: 29.8 PG (ref 26–34)
MCHC RBC AUTO-ENTMCNC: 33.9 G/DL (ref 31–36)
MCV RBC AUTO: 88 FL (ref 80–100)
MONOCYTES # BLD: 0.9 K/UL (ref 0–1.3)
MONOCYTES NFR BLD: 12.2 %
NEUTROPHILS # BLD: 5.4 K/UL (ref 1.7–7.7)
NEUTROPHILS NFR BLD: 71.1 %
PLATELET # BLD AUTO: 278 K/UL (ref 135–450)
PMV BLD AUTO: 8.4 FL (ref 5–10.5)
POTASSIUM SERPL-SCNC: 3.9 MMOL/L (ref 3.5–5.1)
PROTHROMBIN TIME: 58.4 SEC (ref 12.1–14.9)
RBC # BLD AUTO: 4 M/UL (ref 4.2–5.9)
SODIUM SERPL-SCNC: 141 MMOL/L (ref 136–145)
WBC # BLD AUTO: 7.7 K/UL (ref 4–11)

## 2025-07-01 PROCEDURE — 85025 COMPLETE CBC W/AUTO DIFF WBC: CPT

## 2025-07-01 PROCEDURE — 97162 PT EVAL MOD COMPLEX 30 MIN: CPT

## 2025-07-01 PROCEDURE — 97530 THERAPEUTIC ACTIVITIES: CPT

## 2025-07-01 PROCEDURE — 73721 MRI JNT OF LWR EXTRE W/O DYE: CPT

## 2025-07-01 PROCEDURE — 97535 SELF CARE MNGMENT TRAINING: CPT

## 2025-07-01 PROCEDURE — 2500000003 HC RX 250 WO HCPCS: Performed by: HOSPITALIST

## 2025-07-01 PROCEDURE — 80048 BASIC METABOLIC PNL TOTAL CA: CPT

## 2025-07-01 PROCEDURE — 94760 N-INVAS EAR/PLS OXIMETRY 1: CPT

## 2025-07-01 PROCEDURE — 85610 PROTHROMBIN TIME: CPT

## 2025-07-01 PROCEDURE — 97166 OT EVAL MOD COMPLEX 45 MIN: CPT

## 2025-07-01 PROCEDURE — 6370000000 HC RX 637 (ALT 250 FOR IP): Performed by: HOSPITALIST

## 2025-07-01 PROCEDURE — 97116 GAIT TRAINING THERAPY: CPT

## 2025-07-01 PROCEDURE — 72195 MRI PELVIS W/O DYE: CPT

## 2025-07-01 PROCEDURE — 1200000000 HC SEMI PRIVATE

## 2025-07-01 PROCEDURE — 36415 COLL VENOUS BLD VENIPUNCTURE: CPT

## 2025-07-01 PROCEDURE — 6360000002 HC RX W HCPCS: Performed by: HOSPITALIST

## 2025-07-01 RX ADMIN — CARBIDOPA AND LEVODOPA 1.5 TABLET: 25; 100 TABLET ORAL at 13:30

## 2025-07-01 RX ADMIN — CARBIDOPA AND LEVODOPA 1 TABLET: 50; 200 TABLET, EXTENDED RELEASE ORAL at 20:53

## 2025-07-01 RX ADMIN — ASPIRIN 81 MG: 81 TABLET, CHEWABLE ORAL at 09:39

## 2025-07-01 RX ADMIN — FLUDROCORTISONE ACETATE 0.05 MG: 0.1 TABLET ORAL at 08:40

## 2025-07-01 RX ADMIN — GABAPENTIN 300 MG: 300 CAPSULE ORAL at 20:52

## 2025-07-01 RX ADMIN — OXYCODONE AND ACETAMINOPHEN 1 TABLET: 5; 325 TABLET ORAL at 20:52

## 2025-07-01 RX ADMIN — OXYCODONE AND ACETAMINOPHEN 1 TABLET: 5; 325 TABLET ORAL at 09:45

## 2025-07-01 RX ADMIN — METHYLPREDNISOLONE 4 MG: 4 TABLET ORAL at 08:40

## 2025-07-01 RX ADMIN — TAMSULOSIN HYDROCHLORIDE 0.4 MG: 0.4 CAPSULE ORAL at 08:40

## 2025-07-01 RX ADMIN — CARBIDOPA AND LEVODOPA 1.5 TABLET: 25; 100 TABLET ORAL at 17:33

## 2025-07-01 RX ADMIN — SODIUM CHLORIDE, PRESERVATIVE FREE 10 ML: 5 INJECTION INTRAVENOUS at 08:47

## 2025-07-01 RX ADMIN — SELEGILINE HYDROCHLORIDE 5 MG: 5 TABLET ORAL at 20:53

## 2025-07-01 RX ADMIN — SELEGILINE HYDROCHLORIDE 5 MG: 5 TABLET ORAL at 08:40

## 2025-07-01 RX ADMIN — CARBIDOPA AND LEVODOPA 1.5 TABLET: 25; 100 TABLET ORAL at 09:39

## 2025-07-01 RX ADMIN — CARBIDOPA AND LEVODOPA 1.5 TABLET: 25; 100 TABLET ORAL at 06:05

## 2025-07-01 ASSESSMENT — PAIN DESCRIPTION - LOCATION
LOCATION: HIP
LOCATION: HIP

## 2025-07-01 ASSESSMENT — PAIN DESCRIPTION - ORIENTATION
ORIENTATION: RIGHT
ORIENTATION: RIGHT

## 2025-07-01 ASSESSMENT — PAIN DESCRIPTION - DESCRIPTORS
DESCRIPTORS: SHOOTING
DESCRIPTORS: SHOOTING

## 2025-07-01 ASSESSMENT — PAIN DESCRIPTION - FREQUENCY
FREQUENCY: INTERMITTENT
FREQUENCY: INTERMITTENT

## 2025-07-01 ASSESSMENT — PAIN DESCRIPTION - PAIN TYPE
TYPE: ACUTE PAIN
TYPE: ACUTE PAIN

## 2025-07-01 ASSESSMENT — PAIN - FUNCTIONAL ASSESSMENT
PAIN_FUNCTIONAL_ASSESSMENT: PREVENTS OR INTERFERES SOME ACTIVE ACTIVITIES AND ADLS
PAIN_FUNCTIONAL_ASSESSMENT: PREVENTS OR INTERFERES SOME ACTIVE ACTIVITIES AND ADLS

## 2025-07-01 ASSESSMENT — PAIN DESCRIPTION - ONSET
ONSET: ON-GOING
ONSET: ON-GOING

## 2025-07-01 ASSESSMENT — PAIN SCALES - GENERAL
PAINLEVEL_OUTOF10: 3
PAINLEVEL_OUTOF10: 7
PAINLEVEL_OUTOF10: 7
PAINLEVEL_OUTOF10: 0

## 2025-07-01 NOTE — ACP (ADVANCE CARE PLANNING)
Advance Care Planning   Healthcare Decision Maker:    Primary Decision Maker: OzGetachew - Spouse - [phone]    Secondary Decision Maker: Ricardo Clinton - Child - [phone]    Secondary Decision Maker: Karon Callahan - Child - [phone]    Electronically signed by Joann Lee, MSW, LISW, Case Management on 7/1/2025 at 3:11 PM  [place] [phone]

## 2025-07-01 NOTE — PROGRESS NOTES
Patient was A&O x 4. VSS. PIV Line on right hand flushed and no complications noted. Medications given, whole, no problem noted. PO intake was tolerated. Bed alarm on. Informed the patient to call for help if needed.     Assisted to the  bathroom via [place].     Patient verbalized about not to take the Aspirin. As per him, Pharmacist said that if the INR is high, hold it. Called the In-Patient Pharmacist about it and they said that its fine to take it but informed the MD if it needs to be held.     Explained to the patient about what the MD said about the Aspirin. He agreed to take it.     Talked to the daughter and she said that she was concerned about the MRI because there was Heart Valve that was put on his father. Informed the MRI Team and they said that they were aware of it.

## 2025-07-01 NOTE — PROGRESS NOTES
Shift assessment done. All night time medications given per MAR. Patient took all his oral medications whole with water, tolerated well. All fall precautions implemented. All needs attended. Electronically signed by Keyana Anne RN on 6/30/2025 at 10:25 PM

## 2025-07-01 NOTE — PLAN OF CARE
Problem: Chronic Conditions and Co-morbidities  Goal: Patient's chronic conditions and co-morbidity symptoms are monitored and maintained or improved  7/1/2025 0908 by Zan Spears RN  Outcome: Progressing  Flowsheets (Taken 7/1/2025 0908)  Care Plan - Patient's Chronic Conditions and Co-Morbidity Symptoms are Monitored and Maintained or Improved:   Monitor and assess patient's chronic conditions and comorbid symptoms for stability, deterioration, or improvement   Collaborate with multidisciplinary team to address chronic and comorbid conditions and prevent exacerbation or deterioration   Update acute care plan with appropriate goals if chronic or comorbid symptoms are exacerbated and prevent overall improvement and discharge  6/30/2025 2224 by Keyana Anne RN  Outcome: Progressing     Problem: Discharge Planning  Goal: Discharge to home or other facility with appropriate resources  7/1/2025 0908 by Zan Spears RN  Outcome: Progressing  Flowsheets (Taken 7/1/2025 0908)  Discharge to home or other facility with appropriate resources:   Identify barriers to discharge with patient and caregiver   Identify discharge learning needs (meds, wound care, etc)   Refer to discharge planning if patient needs post-hospital services based on physician order or complex needs related to functional status, cognitive ability or social support system   Arrange for needed discharge resources and transportation as appropriate  6/30/2025 2224 by Keyana Anne RN  Outcome: Progressing     Problem: ABCDS Injury Assessment  Goal: Absence of physical injury  7/1/2025 0908 by Zan Spears, RN  Outcome: Progressing  Flowsheets (Taken 7/1/2025 0908)  Absence of Physical Injury: Implement safety measures based on patient assessment  6/30/2025 2224 by Keyana Anne, RN  Outcome: Progressing     Problem: Safety - Adult  Goal: Free from fall injury  7/1/2025 0908 by Zan Spears, RN  Outcome: Progressing  Flowsheets  Taken 7/1/2025 0908  Free

## 2025-07-01 NOTE — CONSULTS
Wt 69 kg (152 lb 1.9 oz)   SpO2 94%   BMI 21.83 kg/m²     MUSCULOSKELETAL:  bilateral foot NVI. Wiggles toes to command. Able to plantarflex and dorsiflex ankle Pedal pulses are palpable. No pain with pelvic rocking. No tenderness right hip . Tender left anterior hip. Nontender at gluteal folds. Neg SLR tests bilaterally.   NEUROLOGIC:   Sensory:    Touch:                                    Right Lower Extremity:  normal                  Left Lower Extremity:  normal  Skin warm and dry  Resp deep and easy  Abdomen soft and round  Pulse is with regular rate and rhythm    DATA:    CBC:   Lab Results   Component Value Date/Time    WBC 7.7 07/01/2025 04:52 AM    RBC 4.00 07/01/2025 04:52 AM    HGB 11.9 07/01/2025 04:52 AM    HCT 35.2 07/01/2025 04:52 AM    MCV 88.0 07/01/2025 04:52 AM    MCH 29.8 07/01/2025 04:52 AM    MCHC 33.9 07/01/2025 04:52 AM    RDW 16.3 07/01/2025 04:52 AM     07/01/2025 04:52 AM    MPV 8.4 07/01/2025 04:52 AM     WBC:    Lab Results   Component Value Date/Time    WBC 7.7 07/01/2025 04:52 AM     PT/INR:    Lab Results   Component Value Date/Time    PROTIME 58.4 07/01/2025 04:52 AM    PROTIME 0.0 06/30/2025 09:47 AM    INR 7.23 07/01/2025 04:52 AM     PTT:    Lab Results   Component Value Date/Time    APTT 40.5 02/15/2025 01:14 PM   [APTT  Radiology Review:    EXAMINATION:  CT OF THE RIGHT HIP WITHOUT CONTRAST 6/30/2025 11:50 am     TECHNIQUE:  CT of the right hip was performed without the administration of intravenous  contrast.  Multiplanar reformatted images are provided for review. Automated  exposure control, iterative reconstruction, and/or weight based adjustment of  the mA/kV was utilized to reduce the radiation dose to as low as reasonably  achievable.     COMPARISON:  Radiograph 17 June 2025     HISTORY  ORDERING SYSTEM PROVIDED HISTORY: Pain.  No known injury  TECHNOLOGIST PROVIDED HISTORY:  Reason for exam:->Pain.  No known injury  Decision Support Exception - unselect if not  a suspected or confirmed  emergency medical condition->Emergency Medical Condition (MA)  Reason for Exam: Pain. No known injury     FINDINGS:  CT of the right hip demonstrates moderate to advanced degeneration of the  spine.  There is degeneration of the SI joints and symphysis pubis.  Extensive atherosclerotic changes throughout the pelvis on.  The prostate  gland is enlarged and impinges upon the base of the bladder which of the  stented.  Multiple sigmoid diverticulum.     The right hip and left hip both demonstrate slight loss of cavity to the  femoral head neck junction there is mild superior joint space narrowing.  Negative for avascular necrosis, stress or insufficiency changes.  No  fracture, lytic or blastic lesion.  Mild spurring along the acetabular  margins bilaterally.  Small bilateral effusions.     IMPRESSION:  1. Mild bilateral hip osteoarthritis.  2. Advanced degeneration of the spine.  3. No acute fracture or dislocation.  4. Recommend MRI of the hip to further characterize.    EXAMINATION:  ONE XRAY VIEW OF THE PELVIS AND TWO XRAY VIEWS LEFT HIP     6/17/2025 6:10 pm     COMPARISON:  01/24/2025     HISTORY:  ORDERING SYSTEM PROVIDED HISTORY: Left hip pain  TECHNOLOGIST PROVIDED HISTORY:  Reason for exam:->fall and pain of the left hip  Reason for Exam: fell about a week ago and injured left hip, pain in left hip     FINDINGS:  A single frontal view of the pelvis as well as frontal and frog-leg lateral  views of the left hip were performed.  There is no radiographic evidence of  an acute fracture or dislocation of either hip or the osseous pelvis.  There  is stable mild symmetric bilateral hip and SI joint osteoarthritis, unchanged  from 01/24/2025.  The pelvic ring is intact.  No destructive osseous lesion  is seen.  Mild enthesopathic changes are evident.  Extensive atherosclerotic  calcifications are seen.     IMPRESSION:  1. No acute radiographic abnormality of the pelvis or left hip.  2.

## 2025-07-01 NOTE — PROGRESS NOTES
Critical results of PT= 58.4 and INR= 7.23 relayed to MD Kirkpatrick and Pharmacists. Electronically signed by Keyana Anne RN on 7/1/2025 at 6:47 AM

## 2025-07-01 NOTE — PLAN OF CARE
Problem: Chronic Conditions and Co-morbidities  Goal: Patient's chronic conditions and co-morbidity symptoms are monitored and maintained or improved  7/1/2025 1950 by Yessica Montes RN  Outcome: Progressing  Flowsheets (Taken 7/1/2025 0908 by Zan Spears, RN)  Care Plan - Patient's Chronic Conditions and Co-Morbidity Symptoms are Monitored and Maintained or Improved:   Monitor and assess patient's chronic conditions and comorbid symptoms for stability, deterioration, or improvement   Collaborate with multidisciplinary team to address chronic and comorbid conditions and prevent exacerbation or deterioration   Update acute care plan with appropriate goals if chronic or comorbid symptoms are exacerbated and prevent overall improvement and discharge  7/1/2025 0908 by Zan Spears RN  Outcome: Progressing  Flowsheets (Taken 7/1/2025 0908)  Care Plan - Patient's Chronic Conditions and Co-Morbidity Symptoms are Monitored and Maintained or Improved:   Monitor and assess patient's chronic conditions and comorbid symptoms for stability, deterioration, or improvement   Collaborate with multidisciplinary team to address chronic and comorbid conditions and prevent exacerbation or deterioration   Update acute care plan with appropriate goals if chronic or comorbid symptoms are exacerbated and prevent overall improvement and discharge     Problem: Discharge Planning  Goal: Discharge to home or other facility with appropriate resources  7/1/2025 1950 by Yessica Montes RN  Outcome: Progressing  Flowsheets (Taken 7/1/2025 0908 by Zan Spears, RN)  Discharge to home or other facility with appropriate resources:   Identify barriers to discharge with patient and caregiver   Identify discharge learning needs (meds, wound care, etc)   Refer to discharge planning if patient needs post-hospital services based on physician order or complex needs related to functional status, cognitive ability or social support system   Arrange for

## 2025-07-01 NOTE — DISCHARGE INSTR - COC
Continuity of Care Form    Patient Name: Leti Clinton   :  1934  MRN:  [id number]    Admit date:  2025  Discharge date:  25    Code Status Order: Full Code   Advance Directives:     Admitting Physician:  Jim Mtz MD  PCP: Kristopher Toribio MD    Discharging Nurse: Blake  Discharging Hospital Unit/Room#: A3W-3447/3108-01  Discharging Unit Phone Number: [phone]    Emergency Contact:   Extended Emergency Contact Information  Primary Emergency Contact: Ricardo Clinton  Address: [address] [place], [place] [id number] [place] [address]  Home Phone: [phone]  Relation: Child   needed? No  Secondary Emergency Contact: Getachew Clinton  Address: [address] [place] [address] [address]           [place] [address] [facility]  Home Phone: [phone]  Mobile Phone: [phone]  Relation: Spouse   needed? No    Past Surgical History:  Past Surgical History:   Procedure Laterality Date    AORTIC VALVE REPLACEMENT  1992    CARDIAC CATHETERIZATION  2022    CATARACT REMOVAL      COLONOSCOPY          COLONOSCOPY  2012    Dr Bales    COLONOSCOPY      several polyps found follow up in three years    DIAGNOSTIC CARDIAC CATH LAB PROCEDURE  2022    EYE SURGERY  2024       Immunization History:   Immunization History   Administered Date(s) Administered    COVID-19, PFIZER Bivalent, DO NOT Dilute, (age 12y+), IM, 30 mcg/0.3 mL 10/13/2022    COVID-19, PFIZER PURPLE top, DILUTE for use, (age 12 y+), 30mcg/0.3mL 2021, 02/10/2021, 10/06/2021    COVID-19, PFIZER, , (age 12y+), IM, 30mcg/0.3mL 2025    Influenza, AFLURIA (age 3 y+), FLUZONE, (age 6 mo+), Quadv MDV, 0.5mL 2017    Influenza, FLUAD, (age 65 y+), IM, Quadv, 0.5mL 10/06/2021, 10/13/2022, 10/12/2023    Influenza, FLUAD, (age 65 y+), IM, Trivalent PF, 0.5mL 10/14/2024    Influenza, FLUARIX, FLULAVAL, FLUZONE (age 6 mo+) and AFLURIA, (age 3 y+),  Assisted  Med Delivery   whole    Wound Care Documentation and Therapy:        Elimination:  Continence:   Bowel: Yes  Bladder: Yes  Urinary Catheter: None   Colostomy/Ileostomy/Ileal Conduit: No       Date of Last BM: 7/2/25    Intake/Output Summary (Last 24 hours) at 7/1/2025 1557  Last data filed at 7/1/2025 1232  Gross per 24 hour   Intake 1921.21 ml   Output --   Net 1921.21 ml     I/O last 3 completed shifts:  In: 1321.2 [P.O.:480; I.V.:841.2]  Out: -     Safety Concerns:     At Risk for Falls    Impairments/Disabilities:      None    Nutrition Therapy:  Current Nutrition Therapy:   - Oral Diet:  General    Routes of Feeding: Oral  Liquids: No Restrictions  Daily Fluid Restriction: no  Last Modified Barium Swallow with Video (Video Swallowing Test): not done    Treatments at the Time of Hospital Discharge:   Respiratory Treatments: none  Oxygen Therapy:  is not on home oxygen therapy.  Ventilator:    - No ventilator support    Rehab Therapies: Physical Therapy and Occupational Therapy  Weight Bearing Status/Restrictions: No weight bearing restrictions  Other Medical Equipment (for information only, NOT a DME order):  walker  Other Treatments: none    Patient's personal belongings (please select all that are sent with patient):  Liliana    RN SIGNATURE:  Electronically signed by Blake Theodore RN on 7/3/25 at 1:38 PM EDT    CASE MANAGEMENT/SOCIAL WORK SECTION    Inpatient Status Date: 6/30/25    Readmission Risk Assessment Score:  [facility] RISK OF UNPLANNED READMISSION 2.0             13.6 Total Score    Discharging to Facility/ Agency   Name: [facility]  Address:  [address] [place] [address]   Phone:  [phone]  Fax:  [phone]     Dialysis Facility (if applicable)   Name:  Address:  Dialysis Schedule:  Phone:  Fax:    / signature: Electronically signed by JESS Hdz on 7/2/25 at 5:01 PM EDT    PHYSICIAN SECTION    Prognosis:

## 2025-07-01 NOTE — PROGRESS NOTES
Clinical Pharmacy Note  Warfarin Consult    Leti Clinton is a 89 y.o. male receiving warfarin managed by pharmacy.      Warfarin Indication: Hx mechanical AVR    Target INR range: 2.5-3.5   Dose prior to admission: 5 mg on SUN and 10 mg ALL OTHER DAYS     Current warfarin drug-drug interactions: none new since admission    Recent Labs     06/30/25  0947 06/30/25  1344 07/01/25  0452   HGB  --  11.3* 11.9*   HCT  --  34.7* 35.2*   INR 6.8  --  7.23*       Assessment/Plan:    INR supratherapeutic on admission. Dose not held yesterday as patient took home dose PTA. Hold warfarin dose today.    Daily PT/INR until stable within therapeutic range.     Thank you for the consult.  Will continue to follow.     Sandra Nuñez RPH, PharmD 7/1/2025 11:16 AM

## 2025-07-01 NOTE — CARE COORDINATION
Case Management Assessment  Initial Evaluation    Date/Time of Evaluation: 7/1/2025 3:15 PM  Assessment Completed by: RASHARD Tena    If patient is discharged prior to next notation, then this note serves as note for discharge by case management.    Patient Name: Leti Clinton                   YOB: 1934  Diagnosis: Inability to ambulate due to hip [R26.2]  Acute hip pain, right [M25.551]  Hip pain, acute, right [M25.551]                   Date / Time: 6/30/2025 10:27 AM    Patient Admission Status: Inpatient   Readmission Risk (Low < 19, Mod (19-27), High > 27): Readmission Risk Score: 13.6    Current PCP: Kristopher Toribio MD  PCP verified by CM? Yes    Chart Reviewed: Yes      History Provided by: Patient, Medical Record, Child/Family  Patient Orientation: Alert and Oriented    Patient Cognition: Alert    Hospitalization in the last 30 days (Readmission):  No    If yes, Readmission Assessment in  Navigator will be completed.    Advance Directives:      Code Status: Full Code   Patient's Primary Decision Maker is: Legal Next of Kin    Primary Decision Maker: Getachew Clinton - Spouse - [phone]    Secondary Decision Maker: Ricardo Clinton - Child - [phone]    Secondary Decision Maker: Karon Callahan - Child - [phone]    Discharge Planning:    Patient lives with: Spouse/Significant Other Type of Home: Other (Comment) (condo)  Primary Care Giver: Self  Patient Support Systems include: Spouse/Significant Other, Children   Current Financial resources: Medicare  Current community resources: None  Current services prior to admission: Durable Medical Equipment            Current DME: Walker, Shower Chair            Type of Home Care services:  None    ADLS  Prior functional level: Independent in ADLs/IADLs  Current functional level: Assistance with the following:, Cooking, Housework, Shopping, Mobility    PT AM-PAC: 15 /24  OT AM-PAC: 17 /24    Family can provide assistance at DC: Yes  Would

## 2025-07-01 NOTE — PROGRESS NOTES
[facility] - Occupational Therapy   Phone: [phone]    Occupational Therapy  Facility/Department:[address] ORTHOPEDICS    [x] Initial Evaluation            [] Daily Treatment Note         [] Discharge Summary      Patient: Leti Clinton   : 1934   MRN: [id number]   Date of Service:  2025    Staff Mobility Recommendation: CGA at RW bed <> chair. STEDY <> bathroom.    AM-PAC Score: 17  Discharge Recommendations: SNF    Leti Clinton scored a 17/ on the AM-PAC ADL Inpatient form. Current research shows that an AM-PAC score of 17 or less is typically not associated with a discharge to the patient's home setting. Based on the patient's AM-PAC score and their current ADL deficits, it is recommended that the patient have 3-5 sessions per week of Occupational Therapy at d/c to increase the patient's independence.  Please see assessment section for further patient specific details.    If patient discharges prior to next session this note will serve as a discharge summary.  Please see below for the latest assessment towards goals.     Admitting Diagnosis:  Hip pain, acute, right  Ordering Physician: Jim Mtz MD   Current Admission Summary: Pt is a 89 yo M admitted with acute R hip pain after a fall several weeks ago. Workup in the ED included CT scan which revealed bilateral hip arthritis, no acute fracture or dislocation. Plans for MRI.     Past Medical History:  has a past medical history of Colon polyps, Mechanical heart valve present, Parkinson disease (HCC), and S/P aortic valve replacement with metallic valve.  Past Surgical History:  has a past surgical history that includes Aortic valve replacement (); Colonoscopy (); Colonoscopy (2012); eye surgery (2024); Colonoscopy (); Diagnostic Cardiac Cath Lab Procedure (2022); Cataract removal (); and Cardiac catheterization (2022).    Assessment  Activity Tolerance: Good  Fair  Painful RLE with  strength/endurance with functional tasks,  [] UE WB [x] Other: transfers, cognition      Electronically signed by DINESH Chavarria/L on 7/1/2025 at 11:05 AM

## 2025-07-01 NOTE — PLAN OF CARE
Problem: ABCDS Injury Assessment  Goal: Absence of physical injury  6/30/2025 2224 by Keyana Anne RN  Outcome: Progressing  6/30/2025 1715 by Zan Spears RN  Outcome: Progressing  Flowsheets (Taken 6/30/2025 1715)  Absence of Physical Injury: Implement safety measures based on patient assessment     Problem: Safety - Adult  Goal: Free from fall injury  Outcome: Progressing     Problem: Pain  Goal: Verbalizes/displays adequate comfort level or baseline comfort level  Outcome: Progressing     Problem: Skin/Tissue Integrity  Goal: Skin integrity remains intact  Description: 1.  Monitor for areas of redness and/or skin breakdown  2.  Assess vascular access sites hourly  3.  Every 4-6 hours minimum:  Change oxygen saturation probe site  4.  Every 4-6 hours:  If on nasal continuous positive airway pressure, respiratory therapy assess nares and determine need for appliance change or resting period  Outcome: Progressing     Problem: Chronic Conditions and Co-morbidities  Goal: Patient's chronic conditions and co-morbidity symptoms are monitored and maintained or improved  6/30/2025 2224 by Keyana Anne RN  Outcome: Progressing  6/30/2025 1715 by Zan Spears RN  Outcome: Progressing  Flowsheets (Taken 6/30/2025 1715)  Care Plan - Patient's Chronic Conditions and Co-Morbidity Symptoms are Monitored and Maintained or Improved:   Monitor and assess patient's chronic conditions and comorbid symptoms for stability, deterioration, or improvement   Collaborate with multidisciplinary team to address chronic and comorbid conditions and prevent exacerbation or deterioration   Update acute care plan with appropriate goals if chronic or comorbid symptoms are exacerbated and prevent overall improvement and discharge     Problem: Discharge Planning  Goal: Discharge to home or other facility with appropriate resources  6/30/2025 2224 by Keyana Anne RN  Outcome: Progressing  6/30/2025 1715 by Zan Spears RN  Outcome:

## 2025-07-01 NOTE — PROGRESS NOTES
[facility] - Physical Therapy   Phone: [phone]    Physical Therapy  Facility/Department:[address] ORTHOPEDICS    [x] Initial Evaluation            [x] Daily Treatment Note         [] Discharge Summary      Patient: Leti Clinton   : 1934   MRN: [id number]   Date of Service:  2025  Staff Mobility Recommendation: walker x1 bed to chair with gait belt and stedy x1 to bathroom    AM-PAC score: 15  Discharge Recommendations: 3-5x/wk  eLti Clinton scored a 15/24 on the AM-PAC short mobility form. Current research shows that an AM-PAC score of 17 or less is typically not associated with a discharge to the patient's home setting. Based on the patient's AM-PAC score and their current functional mobility deficits, it is recommended that the patient have 3-5 sessions per week of Physical Therapy at d/c to increase the patient's independence.  Please see assessment section for further patient specific details.    If patient discharges prior to next session this note will serve as a discharge summary.  Please see below for the latest assessment towards goals.      Admitting Diagnosis: Hip pain, acute, right  Ordering Physician: Jim Mtz MD   Current Admission Summary: Kamar Mtz MD \"Leti Clinton is a 89 y.o. male with a past medical history significant for mechanical aortic valve, on anticoagulation Parkinson's, he also has hip pain going on for some time, was seen by Dr. Rojas couple of days ago as outpatient, daughter tells me that the possible plans of getting an MRI, patient also had a fall earlier in , unfortunately patient at this time is unable to get around even with the assistance of a walker, few weeks ago, patient was getting around without any walker.  Workup in the ED included CT scan which reveals bilateral hip arthritis, no acute fracture or dislocation, labs are benign besides an elevated INR which was done earlier today, it is 6.8.  Patient having mobility issues and hence will

## 2025-07-01 NOTE — PROGRESS NOTES
Perfect Serve sent to provider inquiring about administration of aspirin 81 mg dose scheduled this AM. Patient states when his INR is elevated (currently 7.23), he will hold his aspirin dose. Awaiting response.  Patient able to decline administration, however, aspirin administration should not affect INR levels. Patient educated on the information.

## 2025-07-02 ENCOUNTER — CARE COORDINATION (OUTPATIENT)
Dept: CARE COORDINATION | Age: 89
End: 2025-07-02

## 2025-07-02 LAB
INR PPP: 4.38 (ref 0.86–1.14)
PROTHROMBIN TIME: 40.3 SEC (ref 12.1–14.9)

## 2025-07-02 PROCEDURE — 97535 SELF CARE MNGMENT TRAINING: CPT

## 2025-07-02 PROCEDURE — 1200000000 HC SEMI PRIVATE

## 2025-07-02 PROCEDURE — 36415 COLL VENOUS BLD VENIPUNCTURE: CPT

## 2025-07-02 PROCEDURE — 2500000003 HC RX 250 WO HCPCS: Performed by: HOSPITALIST

## 2025-07-02 PROCEDURE — 94760 N-INVAS EAR/PLS OXIMETRY 1: CPT

## 2025-07-02 PROCEDURE — 85610 PROTHROMBIN TIME: CPT

## 2025-07-02 PROCEDURE — 6370000000 HC RX 637 (ALT 250 FOR IP): Performed by: HOSPITALIST

## 2025-07-02 RX ORDER — WARFARIN SODIUM 5 MG/1
5 TABLET ORAL
Status: COMPLETED | OUTPATIENT
Start: 2025-07-02 | End: 2025-07-02

## 2025-07-02 RX ORDER — OXYCODONE AND ACETAMINOPHEN 5; 325 MG/1; MG/1
1 TABLET ORAL EVERY 8 HOURS PRN
Qty: 8 TABLET | Refills: 0 | Status: SHIPPED | OUTPATIENT
Start: 2025-07-02 | End: 2025-07-05

## 2025-07-02 RX ADMIN — CARBIDOPA AND LEVODOPA 1.5 TABLET: 25; 100 TABLET ORAL at 17:05

## 2025-07-02 RX ADMIN — SELEGILINE HYDROCHLORIDE 5 MG: 5 TABLET ORAL at 08:30

## 2025-07-02 RX ADMIN — TAMSULOSIN HYDROCHLORIDE 0.4 MG: 0.4 CAPSULE ORAL at 08:18

## 2025-07-02 RX ADMIN — OXYCODONE AND ACETAMINOPHEN 1 TABLET: 5; 325 TABLET ORAL at 10:10

## 2025-07-02 RX ADMIN — FLUDROCORTISONE ACETATE 0.05 MG: 0.1 TABLET ORAL at 08:18

## 2025-07-02 RX ADMIN — SELEGILINE HYDROCHLORIDE 5 MG: 5 TABLET ORAL at 20:44

## 2025-07-02 RX ADMIN — ASPIRIN 81 MG: 81 TABLET, CHEWABLE ORAL at 08:18

## 2025-07-02 RX ADMIN — CARBIDOPA AND LEVODOPA 1.5 TABLET: 25; 100 TABLET ORAL at 05:13

## 2025-07-02 RX ADMIN — SODIUM CHLORIDE, PRESERVATIVE FREE 10 ML: 5 INJECTION INTRAVENOUS at 08:17

## 2025-07-02 RX ADMIN — CARBIDOPA AND LEVODOPA 1.5 TABLET: 25; 100 TABLET ORAL at 10:10

## 2025-07-02 RX ADMIN — WARFARIN SODIUM 5 MG: 5 TABLET ORAL at 17:05

## 2025-07-02 RX ADMIN — OXYCODONE AND ACETAMINOPHEN 1 TABLET: 5; 325 TABLET ORAL at 20:44

## 2025-07-02 RX ADMIN — CARBIDOPA AND LEVODOPA 1.5 TABLET: 25; 100 TABLET ORAL at 13:30

## 2025-07-02 RX ADMIN — SODIUM CHLORIDE, PRESERVATIVE FREE 10 ML: 5 INJECTION INTRAVENOUS at 20:42

## 2025-07-02 RX ADMIN — GABAPENTIN 300 MG: 300 CAPSULE ORAL at 20:44

## 2025-07-02 RX ADMIN — CARBIDOPA AND LEVODOPA 1 TABLET: 50; 200 TABLET, EXTENDED RELEASE ORAL at 20:44

## 2025-07-02 ASSESSMENT — PAIN DESCRIPTION - DESCRIPTORS
DESCRIPTORS: SHARP;ACHING
DESCRIPTORS: ACHING
DESCRIPTORS: ACHING

## 2025-07-02 ASSESSMENT — PAIN SCALES - GENERAL
PAINLEVEL_OUTOF10: 5
PAINLEVEL_OUTOF10: 2
PAINLEVEL_OUTOF10: 0
PAINLEVEL_OUTOF10: 5

## 2025-07-02 ASSESSMENT — PAIN DESCRIPTION - FREQUENCY
FREQUENCY: CONTINUOUS
FREQUENCY: CONTINUOUS
FREQUENCY: INTERMITTENT

## 2025-07-02 ASSESSMENT — PAIN - FUNCTIONAL ASSESSMENT
PAIN_FUNCTIONAL_ASSESSMENT: PREVENTS OR INTERFERES SOME ACTIVE ACTIVITIES AND ADLS

## 2025-07-02 ASSESSMENT — PAIN DESCRIPTION - LOCATION
LOCATION: HIP

## 2025-07-02 ASSESSMENT — PAIN DESCRIPTION - ONSET
ONSET: ON-GOING

## 2025-07-02 ASSESSMENT — PAIN DESCRIPTION - ORIENTATION
ORIENTATION: RIGHT

## 2025-07-02 ASSESSMENT — PAIN DESCRIPTION - PAIN TYPE
TYPE: ACUTE PAIN

## 2025-07-02 NOTE — CARE COORDINATION
Please note that patient's wife is in ED. They are hoping to be placed together. SW will call covering  momentarily.      Respectfully submitted,    Lilian TEMPLETON, [place]  [facility]   [phone]    Electronically signed by YOKASTA Agosto, JESS on 7/2/2025 at 11:35 AM

## 2025-07-02 NOTE — PLAN OF CARE
Problem: Chronic Conditions and Co-morbidities  Goal: Patient's chronic conditions and co-morbidity symptoms are monitored and maintained or improved  7/2/2025 0920 by Zan Spears RN  Outcome: Progressing  Flowsheets (Taken 7/2/2025 0920)  Care Plan - Patient's Chronic Conditions and Co-Morbidity Symptoms are Monitored and Maintained or Improved:   Monitor and assess patient's chronic conditions and comorbid symptoms for stability, deterioration, or improvement   Update acute care plan with appropriate goals if chronic or comorbid symptoms are exacerbated and prevent overall improvement and discharge   Collaborate with multidisciplinary team to address chronic and comorbid conditions and prevent exacerbation or deterioration  7/1/2025 1950 by Yessica Montes RN  Outcome: Progressing  Flowsheets (Taken 7/1/2025 0908 by Zan Spears RN)  Care Plan - Patient's Chronic Conditions and Co-Morbidity Symptoms are Monitored and Maintained or Improved:   Monitor and assess patient's chronic conditions and comorbid symptoms for stability, deterioration, or improvement   Collaborate with multidisciplinary team to address chronic and comorbid conditions and prevent exacerbation or deterioration   Update acute care plan with appropriate goals if chronic or comorbid symptoms are exacerbated and prevent overall improvement and discharge     Problem: Discharge Planning  Goal: Discharge to home or other facility with appropriate resources  7/2/2025 0920 by Zan Spears RN  Outcome: Progressing  Flowsheets (Taken 7/2/2025 0920)  Discharge to home or other facility with appropriate resources:   Identify barriers to discharge with patient and caregiver   Identify discharge learning needs (meds, wound care, etc)   Arrange for needed discharge resources and transportation as appropriate  7/1/2025 1950 by Yessica Montes RN  Outcome: Progressing  Flowsheets (Taken 7/1/2025 0908 by Zan Spears, SANTIAGO)  Discharge to home or other  level:   Encourage patient to monitor pain and request assistance   Administer analgesics based on type and severity of pain and evaluate response   Assess pain using appropriate pain scale   Implement non-pharmacological measures as appropriate and evaluate response     Problem: Skin/Tissue Integrity  Goal: Skin integrity remains intact  Description: 1.  Monitor for areas of redness and/or skin breakdown  2.  Assess vascular access sites hourly  3.  Every 4-6 hours minimum:  Change oxygen saturation probe site  4.  Every 4-6 hours:  If on nasal continuous positive airway pressure, respiratory therapy assess nares and determine need for appliance change or resting period  7/2/2025 0920 by Zan Spears, RN  Outcome: Progressing  Flowsheets (Taken 7/2/2025 0920)  Skin Integrity Remains Intact:   Monitor for areas of redness and/or skin breakdown   Assess vascular access sites hourly   Positioning devices   Turn and reposition as indicated  7/1/2025 1950 by Yessica Montes, RN  Outcome: Progressing  Flowsheets (Taken 7/1/2025 0908 by Zan Spears, RN)  Skin Integrity Remains Intact:   Monitor for areas of redness and/or skin breakdown   Assess vascular access sites hourly   Positioning devices   Turn and reposition as indicated

## 2025-07-02 NOTE — PROGRESS NOTES
Patient to restroom. IV slightly pulled out of place and bleeding. IV removed and site continuing to bleed. Pressure held to site until bleeding stopped. Patient back to bed. Dressing placed on site to avoid bleeding again.

## 2025-07-02 NOTE — PROGRESS NOTES
[facility] Orthopedic Surgery   Progress Note      S/P :  SUBJECTIVE  in recliner. Eating lunch. Family at bedside. Pain is not  described in hips at this time     OBJECTIVE              Physical                      VITALS:  BP (!) 143/69   Pulse 63   Temp 97.9 °F (36.6 °C)   Resp 14   Ht 1.778 m (5' 10\")   Wt 71.1 kg (156 lb 12 oz)   SpO2 99%   BMI 22.49 kg/m²                     MUSCULOSKELETAL:  bilateral LE grossly NVI, moves well in chair.                     Data       CBC:   Lab Results   Component Value Date/Time    WBC 7.7 07/01/2025 04:52 AM    RBC 4.00 07/01/2025 04:52 AM    HGB 11.9 07/01/2025 04:52 AM    HCT 35.2 07/01/2025 04:52 AM    MCV 88.0 07/01/2025 04:52 AM    MCH 29.8 07/01/2025 04:52 AM    MCHC 33.9 07/01/2025 04:52 AM    RDW 16.3 07/01/2025 04:52 AM     07/01/2025 04:52 AM    MPV 8.4 07/01/2025 04:52 AM        WBC:    Lab Results   Component Value Date/Time    WBC 7.7 07/01/2025 04:52 AM        Hemoglobin/Hematocrit:    Lab Results   Component Value Date/Time    HGB 11.9 07/01/2025 04:52 AM    HCT 35.2 07/01/2025 04:52 AM        PT/INR:    Lab Results   Component Value Date/Time    PROTIME 40.3 07/02/2025 04:24 AM    PROTIME 0.0 06/30/2025 09:47 AM    INR 4.38 07/02/2025 04:24 AM          EXAMINATION:  MRI OF THE RIGHT HIP WITHOUT CONTRAST; MRI OF THE PELVIS WITHOUT CONTRAST,  7/1/2025 1:58 pm     TECHNIQUE:  Multiplanar multisequence MRI of the right hip was performed without the  administration of intravenous contrast.; Multiplanar multisequence MRI of the  pelvis was performed without the administration of intravenous contrast.     COMPARISON:  CT pelvis 06/30/2025     HISTORY:  ORDERING SYSTEM PROVIDED HISTORY: Hip pain. Unable to walk  TECHNOLOGIST PROVIDED HISTORY:  Reason for exam:->Hip pain. Unable to walk  What is the sedation requirement?->None  Reason for Exam: Hip pain. Unable to walk; ORDERING SYSTEM PROVIDED HISTORY:  Right hip pain  TECHNOLOGIST PROVIDED

## 2025-07-02 NOTE — CARE COORDINATION
Discussed bed availability with son and community CM throughout the day. They have decided on [facility]. Will initiate precert in am.   Electronically signed by JESS Hdz on 7/2/2025 at 5:05 PM

## 2025-07-02 NOTE — PROGRESS NOTES
[facility] - Occupational Therapy   Phone: [phone]    Occupational Therapy  Facility/Department:[address] ORTHOPEDICS    [] Initial Evaluation            [x] Daily Treatment Note         [] Discharge Summary      Patient: Leti Clinton   : 1934   MRN: [id number]   Date of Service:  2025    Staff Mobility Recommendation: CGA at RW bed <> chair. STEDY <> bathroom if painful.    AM-PAC Score: 17/24  Discharge Recommendations: SNF    Leti Clinton scored a 17/24 on the AM-PAC ADL Inpatient form. Current research shows that an AM-PAC score of 17 or less is typically not associated with a discharge to the patient's home setting. Based on the patient's AM-PAC score and their current ADL deficits, it is recommended that the patient have 3-5 sessions per week of Occupational Therapy at d/c to increase the patient's independence.  Please see assessment section for further patient specific details.    If patient discharges prior to next session this note will serve as a discharge summary.  Please see below for the latest assessment towards goals.     Admitting Diagnosis:  Hip pain, acute, right  Ordering Physician: Jim Mtz MD   Current Admission Summary: Pt is a 89 yo M admitted with acute R hip pain after a fall several weeks ago. Workup in the ED included CT scan which revealed bilateral hip arthritis, no acute fracture or dislocation. Plans for MRI of pelvis and hip per Ortho.     Past Medical History:  has a past medical history of Colon polyps, Mechanical heart valve present, Parkinson disease (HCC), and S/P aortic valve replacement with metallic valve.  Past Surgical History:  has a past surgical history that includes Aortic valve replacement (); Colonoscopy (); Colonoscopy (2012); eye surgery (2024); Colonoscopy (); Diagnostic Cardiac Cath Lab Procedure (2022); Cataract removal (); and Cardiac catheterization (2022).    Assessment  Activity Tolerance:  bathe lower legs/feet with inc pain reported R hip, standing to bathe buttocks with Robbie for standing balance and assist for thorough completion.   Upper Extremity Dressing: setup assistance stand by assistance  Lower Extremity Dressing: moderate assistance Increased time to complete task  Dressing Comments: Pt doffed/donned clean gown seated with setup SBA. Doffed brief/footies with inc time, assist to thread clean footies and brief wong d/t fatigue following shower session, pt able to manage brief in stance with CGA/Robbie.   Toileting: contact guard assistance minimal assistance.    Toileting Comments: urinated seated on commode, managed clothing in stance CGA/Robbie.   Instrumental Activities of Daily Living  No IADL completed on this date.    Functional Mobility  Bed Mobility:  Supine to Sit: minimal assistance, use of bed rail, head of bed elevated - inc time d/t painful RLE  Transfers:  Sit to stand transfer:minimal assistance, moderate assistance  Stand to sit transfer: minimal assistance  Pt tx from EOB, to/from commode, to/from shower chair and to recliner min/mod A with inc assist required to stand as pt fatigued. Cues for hand placement throughout all txs.   Functional Mobility  Functional Mobility Activity: bed > commode > shower chair > recliner  Device Use: rolling walker  Required Assistance: minimal assistance  Comment: Robbie throughout with cues for walker safety/management, inc time to advance RLE d/t pain, pt demo narrow base of support with 1 slight LOB, assist to correct.  Balance:  Static Sitting Balance: fair (-): maintains balance at SBA with use of UE support  Dynamic Sitting Balance: fair (-): maintains balance at SBA with use of UE support  Static Standing Balance: fair (-): maintains balance at CGA with use of UE support  Dynamic Standing Balance: CGA/Robbie without UE support as observed throughout ADL tasks    Cognition  Overall Cognitive Status: WFL  Orientation:    alert and oriented x

## 2025-07-02 NOTE — DISCHARGE SUMMARY
Hospital Medicine Discharge Summary    Patient ID: Leti Clinton      Patient's PCP: Kristopher Toribio MD    Admit Date: 6/30/2025     Discharge Date:   07/02/25    Admitting Physician: Jim Mtz MD     Discharging Provider: Azul Barone PA-C       Hospital Course: 89 year old male with a past medical history of Parkinson disease, hypertension, chronic systolic CHF, and aortic valve replacement who presented to the ED with complaint of right hip pain. Patient reportedly fell on 6/4 with associated left hip pain. He was evaluated by orthopedic surgery on 6/27 and ordered an MRI for further evaluation of pelvic ring or sacral fracture which has not yet been done. He was started on a Medrol Dosepak and has been taking oxycodone and tylenol without any relief. He has since developed right hip pain without any new trauma. He is unable to bear weight or ambulate even with the assistance of a walker. Patient was hemodynamically stable on arrival. Basic lab work including CBC and BMP unremarkable. INR was noted to be supratherapeutic at 7.23. CT of the right hip consistent with mild bilateral hip osteoarthritis and advanced degeration of the spine, otherwise no acute fracture or dislocation. Patient was admitted for further evaluation and management in consultation with orthopedic surgery. MRI was ordered which showed acute bilateral nondisplaced vertical sacral insufficiency fractures and associated muscle strain. Orthopedic surgery recommended no surgical intervention, weight bearing as tolerated, PT/OT and follow up with Dr. Rojas in 3-4 weeks. PT/OT recommended SNF and case management is facilitating placement. Pharmacy managed warfarin dosing and patient will need continued daily INR checks once at facility.      Mechanical fall   Bilateral hip pain  Bilateral hip osteoarthritis   Degenerative disc disease   Ambulatory dysfunction  -Pain not improved with oxycodone, tylenol or steroids  -CT scan of right hip

## 2025-07-02 NOTE — PLAN OF CARE
Problem: Chronic Conditions and Co-morbidities  Goal: Patient's chronic conditions and co-morbidity symptoms are monitored and maintained or improved  7/2/2025 1920 by Yessica Montes RN  Outcome: Progressing  Flowsheets (Taken 7/2/2025 0920 by Zan Spears, RN)  Care Plan - Patient's Chronic Conditions and Co-Morbidity Symptoms are Monitored and Maintained or Improved:   Monitor and assess patient's chronic conditions and comorbid symptoms for stability, deterioration, or improvement   Update acute care plan with appropriate goals if chronic or comorbid symptoms are exacerbated and prevent overall improvement and discharge   Collaborate with multidisciplinary team to address chronic and comorbid conditions and prevent exacerbation or deterioration  7/2/2025 0920 by Zan Spears RN  Outcome: Progressing  Flowsheets (Taken 7/2/2025 0920)  Care Plan - Patient's Chronic Conditions and Co-Morbidity Symptoms are Monitored and Maintained or Improved:   Monitor and assess patient's chronic conditions and comorbid symptoms for stability, deterioration, or improvement   Update acute care plan with appropriate goals if chronic or comorbid symptoms are exacerbated and prevent overall improvement and discharge   Collaborate with multidisciplinary team to address chronic and comorbid conditions and prevent exacerbation or deterioration     Problem: Discharge Planning  Goal: Discharge to home or other facility with appropriate resources  7/2/2025 1920 by Yessica Montes RN  Outcome: Progressing  Flowsheets (Taken 7/2/2025 0920 by Zan Spears, RN)  Discharge to home or other facility with appropriate resources:   Identify barriers to discharge with patient and caregiver   Identify discharge learning needs (meds, wound care, etc)   Arrange for needed discharge resources and transportation as appropriate  7/2/2025 0920 by Zan Spears, RN  Outcome: Progressing  Flowsheets (Taken 7/2/2025 0920)  Discharge to home or other

## 2025-07-02 NOTE — PROGRESS NOTES
Patient A&O X 4. VSS. Left PIV Line was flushed, dressing clean, dry and intact. Patient reports pain of a 0/10. Pain medication administered as ordered. All other AM medications taken whole without complaint (see eMAR).  Patient tolerating PO intake and appetite adequate. No other needs verbalized at this time. Standard safety precautions in place and call light within reach.

## 2025-07-02 NOTE — PROGRESS NOTES
Clinical Pharmacy Note  Warfarin Consult    Leti Clinton is a 89 y.o. male receiving warfarin managed by pharmacy.      Warfarin Indication: Hx mechanical AVR    Target INR range: 2.5-3.5   Dose prior to admission: 5 mg on SUN and 10 mg ALL OTHER DAYS     Current warfarin drug-drug interactions: none new since admission    Recent Labs     06/30/25  0947 06/30/25  1344 07/01/25  0452 07/02/25  0424   HGB  --  11.3* 11.9*  --    HCT  --  34.7* 35.2*  --    INR 6.8  --  7.23* 4.38*       Assessment/Plan:    INR supratherapeutic still, but with significant drop from 7/1-7/2 in response to one day of holding warfarin 7/1/25. Plan for discharge to SNF when able.     Will give warfarin 5 mg today.     Daily PT/INR until stable within therapeutic range.     Thank you for the consult.  Will continue to follow.     Celsa Palomares RPH, PharmD 7/2/2025 8:36 AM

## 2025-07-03 VITALS
OXYGEN SATURATION: 98 % | DIASTOLIC BLOOD PRESSURE: 69 MMHG | RESPIRATION RATE: 16 BRPM | SYSTOLIC BLOOD PRESSURE: 148 MMHG | HEART RATE: 64 BPM | BODY MASS INDEX: 21.15 KG/M2 | TEMPERATURE: 96.2 F | WEIGHT: 147.71 LBS | HEIGHT: 70 IN

## 2025-07-03 LAB
ANION GAP SERPL CALCULATED.3IONS-SCNC: 12 MMOL/L (ref 3–16)
BASOPHILS # BLD: 0 K/UL (ref 0–0.2)
BASOPHILS NFR BLD: 0.5 %
BUN SERPL-MCNC: 23 MG/DL (ref 7–20)
CALCIUM SERPL-MCNC: 9.5 MG/DL (ref 8.3–10.6)
CHLORIDE SERPL-SCNC: 102 MMOL/L (ref 99–110)
CO2 SERPL-SCNC: 30 MMOL/L (ref 21–32)
CREAT SERPL-MCNC: 0.8 MG/DL (ref 0.8–1.3)
DEPRECATED RDW RBC AUTO: 16.5 % (ref 12.4–15.4)
EOSINOPHIL # BLD: 0 K/UL (ref 0–0.6)
EOSINOPHIL NFR BLD: 0.6 %
GFR SERPLBLD CREATININE-BSD FMLA CKD-EPI: 83 ML/MIN/{1.73_M2}
GLUCOSE SERPL-MCNC: 76 MG/DL (ref 70–99)
HCT VFR BLD AUTO: 42.6 % (ref 40.5–52.5)
HGB BLD-MCNC: 13.9 G/DL (ref 13.5–17.5)
INR PPP: 3.05 (ref 0.86–1.14)
LYMPHOCYTES # BLD: 1.2 K/UL (ref 1–5.1)
LYMPHOCYTES NFR BLD: 16.6 %
MCH RBC QN AUTO: 29.4 PG (ref 26–34)
MCHC RBC AUTO-ENTMCNC: 32.7 G/DL (ref 31–36)
MCV RBC AUTO: 89.8 FL (ref 80–100)
MONOCYTES # BLD: 0.9 K/UL (ref 0–1.3)
MONOCYTES NFR BLD: 12.6 %
NEUTROPHILS # BLD: 5.2 K/UL (ref 1.7–7.7)
NEUTROPHILS NFR BLD: 69.7 %
PLATELET # BLD AUTO: 382 K/UL (ref 135–450)
PMV BLD AUTO: 8.4 FL (ref 5–10.5)
POTASSIUM SERPL-SCNC: 4.3 MMOL/L (ref 3.5–5.1)
PROTHROMBIN TIME: 30.8 SEC (ref 12.1–14.9)
RBC # BLD AUTO: 4.75 M/UL (ref 4.2–5.9)
SODIUM SERPL-SCNC: 144 MMOL/L (ref 136–145)
WBC # BLD AUTO: 7.5 K/UL (ref 4–11)

## 2025-07-03 PROCEDURE — 9990000010 HC NO CHARGE VISIT

## 2025-07-03 PROCEDURE — 85025 COMPLETE CBC W/AUTO DIFF WBC: CPT

## 2025-07-03 PROCEDURE — 6370000000 HC RX 637 (ALT 250 FOR IP): Performed by: HOSPITALIST

## 2025-07-03 PROCEDURE — 2500000003 HC RX 250 WO HCPCS: Performed by: HOSPITALIST

## 2025-07-03 PROCEDURE — 36415 COLL VENOUS BLD VENIPUNCTURE: CPT

## 2025-07-03 PROCEDURE — 80048 BASIC METABOLIC PNL TOTAL CA: CPT

## 2025-07-03 PROCEDURE — 94760 N-INVAS EAR/PLS OXIMETRY 1: CPT

## 2025-07-03 PROCEDURE — 85610 PROTHROMBIN TIME: CPT

## 2025-07-03 RX ORDER — WARFARIN SODIUM 5 MG/1
10 TABLET ORAL
Status: DISCONTINUED | OUTPATIENT
Start: 2025-07-03 | End: 2025-07-03 | Stop reason: HOSPADM

## 2025-07-03 RX ADMIN — SELEGILINE HYDROCHLORIDE 5 MG: 5 TABLET ORAL at 10:48

## 2025-07-03 RX ADMIN — TAMSULOSIN HYDROCHLORIDE 0.4 MG: 0.4 CAPSULE ORAL at 10:39

## 2025-07-03 RX ADMIN — SODIUM CHLORIDE, PRESERVATIVE FREE 10 ML: 5 INJECTION INTRAVENOUS at 10:42

## 2025-07-03 RX ADMIN — CARBIDOPA AND LEVODOPA 1.5 TABLET: 25; 100 TABLET ORAL at 05:25

## 2025-07-03 RX ADMIN — CARBIDOPA AND LEVODOPA 1.5 TABLET: 25; 100 TABLET ORAL at 10:39

## 2025-07-03 RX ADMIN — FLUDROCORTISONE ACETATE 0.05 MG: 0.1 TABLET ORAL at 10:39

## 2025-07-03 RX ADMIN — ASPIRIN 81 MG: 81 TABLET, CHEWABLE ORAL at 10:39

## 2025-07-03 RX ADMIN — CARBIDOPA AND LEVODOPA 1.5 TABLET: 25; 100 TABLET ORAL at 13:18

## 2025-07-03 NOTE — PROGRESS NOTES
Patient resting in bed this morning with no complaints of pain. Scheduled morning medications administered per orders. See eMAR for documentation of medication administration. Patient tolerated medications whole w/ water w/o complication. IV to the L wrist flushed w/o complication and is saline locked at this time. Alcohol cap in place.     Head to toe assessment completed and charted. See flowsheets for documentation.     Patient updated with plan of care for the shift, denies questions. Patient denies further physical/emotional needs at this time. Call light, telephone, and bed side table are within reach. Fall precautions in place. Will continue to monitor and assess.

## 2025-07-03 NOTE — PROGRESS NOTES
Hospitalist Progress Note      PCP: Kristopher Toribio MD    Date of Admission: 6/30/2025    Chief Complaint: Hip pain, debility    Hospital Course: 89 year old male with a past medical history of Parkinson disease, hypertension, chronic systolic CHF, and aortic valve replacement who presented to the ED with complaint of right hip pain. Patient reportedly fell on 6/4 with associated left hip pain. He was evaluated by orthopedic surgery on 6/27 and ordered an MRI for further evaluation of pelvic ring or sacral fracture which has not yet been done. He was started on a Medrol Dosepak and has been taking oxycodone and tylenol without any relief. He has since developed right hip pain without any new trauma. He is unable to bear weight or ambulate even with the assistance of a walker. Patient was hemodynamically stable on arrival. Basic lab work including CBC and BMP unremarkable. INR was noted to be supratherapeutic at 7.23. CT of the right hip consistent with mild bilateral hip osteoarthritis and advanced degeration of the spine, otherwise no acute fracture or dislocation. Patient was admitted for further evaluation and management in consultation with orthopedic surgery. MRI was ordered which showed acute bilateral nondisplaced vertical sacral insufficiency fractures and associated muscle strain. Orthopedic surgery recommended no surgical intervention, weight bearing as tolerated, PT/OT and follow up with Dr. Rojas in 3-4 weeks. PT/OT recommended SNF and case management is facilitating placement. Pharmacy managed warfarin dosing and patient will need continued daily INR checks once at facility.  Patient's discharge order was placed 7/2, currently awaiting insurance pre-CERT.  See DC summary from 7/2.    INR now improved, only mildly supratherapeutic at 3.05, will continue to monitor..  Pharmacy following, will still need INR checks daily while at facility until within goal.  CBC and BMP unremarkable.  Patient had

## 2025-07-03 NOTE — CARE COORDINATION
DISCHARGE SUMMARY     DATE OF DISCHARGE: 7/3/25    DISCHARGE DESTINATION: SNF      FACILITY: [facility]   PHONE NUMBER: [phone]    FAX NUMBER: [phone]    INSURANCE PRECERT OBTAINED: yes    PO/ZAHEER COMPLETED: yes    COVID RESULT: NA      TRANSPORTATION:     Company Name:  [employer]     Time: 3pm    Phone Number: [phone]      COMMENTS: Family aware and in agreement with d/c plan.

## 2025-07-03 NOTE — PROGRESS NOTES
Clinical Pharmacy Note  Warfarin Consult    Leti Clinton is a 89 y.o. male receiving warfarin managed by pharmacy.      Warfarin Indication: Hx mechanical AVR    Target INR range: 2.5-3.5   Dose prior to admission: 5 mg on SUN and 10 mg ALL OTHER DAYS     Current warfarin drug-drug interactions: none new since admission    Recent Labs     06/30/25  1344 07/01/25  0452 07/02/25  0424 07/03/25  0505   HGB 11.3* 11.9*  --  13.9   HCT 34.7* 35.2*  --  42.6   INR  --  7.23* 4.38* 3.05*       Assessment/Plan:  INR back in range today. Has had a significant drop after holding on 7/1, INR: 7.23-->4.38-->3.05.  Home dose of warfarin 10 mg tonight.  Plan for discharge to SNF when able.     Will give warfarin 5 mg today.     Daily PT/INR until stable within therapeutic range.     Thank you for the consult.  Will continue to follow.     Tigist Hays [facility], PharmD 7/3/2025 12:47 PM

## 2025-07-03 NOTE — CARE COORDINATION
[facility] - [employer] PRE-CERT REQUEST SUBMITTED VIA "[employer] PORTAL    REQUESTED FOR FACILITY:  [facility] [place]    ANTICIPATED ADMIT DATE TO [facility]:  07/03/2025      [id number]     Electronically signed by Jade Silva, Case Management Assistant Jade Silva on 7/3/2025 at 10:47 AM

## 2025-07-03 NOTE — PROGRESS NOTES
This RN called [facility] and gave report to receiving nurse there.  All questions answered and care plan reviewed.

## 2025-07-03 NOTE — CARE COORDINATION
[employer] AUTHORIZATION INFORMATION      LOCATION:  [facility]    EFFECTIVE DATE:  07/03 thru 07/09/2025    NEXT REVIEW DATE:  07/09/2025    AUTH#:  [id number]     Electronically signed by Jade Silva, Case Management Assistant Jade Silva on 7/3/2025 at 11:01 AM

## 2025-07-03 NOTE — PROGRESS NOTES
Physical Therapy      Leti Clinton  7/3/2025    -chart reviewed  -plans discharge to SNF today in several hours   -recommended transport by stretcher as needing up to mod assist for transfers and likely to be painful sitting upright (acute bilateral sacral insufficiency fractures) to travel to the SNF     -will defer today    Electronically signed by NAMRATA MERRITT, PT on 7/3/2025 at 12:51 PM

## 2025-07-07 ENCOUNTER — CARE COORDINATION (OUTPATIENT)
Dept: CARE COORDINATION | Age: 89
End: 2025-07-07

## 2025-07-10 ENCOUNTER — ANTI-COAG VISIT (OUTPATIENT)
Dept: PHARMACY | Age: 89
End: 2025-07-10
Payer: MEDICARE

## 2025-07-10 DIAGNOSIS — Z79.01 ANTICOAGULATED ON COUMADIN: Primary | ICD-10-CM

## 2025-07-10 DIAGNOSIS — Z95.2 HISTORY OF HEART VALVE TRANSPLANT: ICD-10-CM

## 2025-07-10 DIAGNOSIS — Z95.2 HX OF ARTIFICIAL HEART VALVE REPLACEMENT: ICD-10-CM

## 2025-07-10 LAB
INTERNATIONAL NORMALIZATION RATIO, POC: 1.3
PROTHROMBIN TIME, POC: 0

## 2025-07-10 PROCEDURE — 99212 OFFICE O/P EST SF 10 MIN: CPT | Performed by: PHARMACIST

## 2025-07-10 PROCEDURE — 85610 PROTHROMBIN TIME: CPT | Performed by: PHARMACIST

## 2025-07-10 NOTE — PROGRESS NOTES
Leti Clinton is a 89 y.o. here for warfarin management.  Leti had an INR test today. Results were reviewed and appropriate warfarin management was completed.     Patient verifies current warfarin dosing regimen: Yes, but unsure how SNF has been dosing the past few days since hospital discharge on 7/3    Warfarin medication reviewed and updated on the patient 's home medication list: Yes   All other medications reviewed and updated on the patient 's home medication list: Yes     Lab Results   Component Value Date    INR 1.3 07/10/2025    INR 3.05 (H) 2025    INR 4.38 (H) 2025     Patient Findings       Positives:  Missed doses, Hospital admission    Negatives:  Signs/symptoms of thrombosis, Signs/symptoms of bleeding, Laboratory test error suspected, Change in health, Change in alcohol use, Change in activity, Upcoming invasive procedure, Emergency department visit, Upcoming dental procedure, Extra doses, Change in medications, Change in diet/appetite, Bruising, Other complaints    Comments:  Was in the hospital last week, several doses held for high INR (7ish), and possibly given lower dose while at [facility] the past few days          Anticoagulation Summary  As of 7/10/2025      INR goal:  2.5-3.5   TTR:  60.4% (4.5 y)   INR used for dosin.3 (7/10/2025)   Warfarin maintenance plan:  5 mg (10 mg x 0.5) every Sun; 10 mg (10 mg x 1) all other days   Weekly warfarin total:  65 mg   Plan last modified:  Mecca Holden RPH (2025)   Next INR check:  2025   Priority:  Maintenance   Target end date:  Indefinite    Indications    Anticoagulated on Coumadin [Z79.01]  Mechanical AVR  [Z95.2]                 Anticoagulation Episode Summary       INR check location:  --    Preferred lab:  --    Send INR reminders to:  [facility]    Comments:  EPIC  Consent: 1/15/24  son = Ricardo          Anticoagulation Care Providers       Provider Role Specialty Phone number

## 2025-07-17 LAB
INR BLD: 1.5
PROTIME: NORMAL

## 2025-07-18 ENCOUNTER — ANTI-COAG VISIT (OUTPATIENT)
Dept: PHARMACY | Age: 89
End: 2025-07-18
Payer: MEDICARE

## 2025-07-18 DIAGNOSIS — Z95.2 HISTORY OF HEART VALVE TRANSPLANT: ICD-10-CM

## 2025-07-18 DIAGNOSIS — Z79.01 ANTICOAGULATED ON COUMADIN: Primary | ICD-10-CM

## 2025-07-21 ENCOUNTER — CARE COORDINATION (OUTPATIENT)
Dept: CARE COORDINATION | Age: 89
End: 2025-07-21

## 2025-07-21 NOTE — CARE COORDINATION
Spoke to son alta to attempt [facility] discharge outreach. Per alta they moved to a different unit at same facility so they did not dc home yet. States they will likely be there another month or longer.

## 2025-07-24 ENCOUNTER — APPOINTMENT (OUTPATIENT)
Dept: CT IMAGING | Age: 89
End: 2025-07-24
Attending: EMERGENCY MEDICINE
Payer: MEDICARE

## 2025-07-24 ENCOUNTER — ANTI-COAG VISIT (OUTPATIENT)
Dept: PHARMACY | Age: 89
End: 2025-07-24
Payer: MEDICARE

## 2025-07-24 ENCOUNTER — HOSPITAL ENCOUNTER (EMERGENCY)
Age: 89
Discharge: HOME | End: 2025-07-24
Attending: EMERGENCY MEDICINE
Payer: MEDICARE

## 2025-07-24 VITALS
DIASTOLIC BLOOD PRESSURE: 69 MMHG | HEIGHT: 70 IN | WEIGHT: 151.7 LBS | OXYGEN SATURATION: 100 % | SYSTOLIC BLOOD PRESSURE: 167 MMHG | HEART RATE: 64 BPM | TEMPERATURE: 97.3 F | BODY MASS INDEX: 21.72 KG/M2 | RESPIRATION RATE: 13 BRPM

## 2025-07-24 DIAGNOSIS — Z79.01 ANTICOAGULATED ON COUMADIN: Primary | ICD-10-CM

## 2025-07-24 DIAGNOSIS — Z95.2 HISTORY OF HEART VALVE TRANSPLANT: ICD-10-CM

## 2025-07-24 DIAGNOSIS — S09.90XA CLOSED HEAD INJURY, INITIAL ENCOUNTER: Primary | ICD-10-CM

## 2025-07-24 LAB
ALBUMIN SERPL-MCNC: 3.9 G/DL (ref 3.4–5)
ALBUMIN/GLOB SERPL: 1.5 {RATIO} (ref 1.1–2.2)
ALP SERPL-CCNC: 202 U/L (ref 40–129)
ALT SERPL-CCNC: 6 U/L (ref 10–40)
ANION GAP SERPL CALCULATED.3IONS-SCNC: 8 MMOL/L (ref 3–16)
AST SERPL-CCNC: 31 U/L (ref 15–37)
BASOPHILS # BLD: 0 K/UL (ref 0–0.2)
BASOPHILS NFR BLD: 0.5 %
BILIRUB SERPL-MCNC: 0.6 MG/DL (ref 0–1)
BUN SERPL-MCNC: 22 MG/DL (ref 7–20)
CALCIUM SERPL-MCNC: 9.3 MG/DL (ref 8.3–10.6)
CHLORIDE SERPL-SCNC: 107 MMOL/L (ref 99–110)
CO2 SERPL-SCNC: 28 MMOL/L (ref 21–32)
CREAT SERPL-MCNC: 1 MG/DL (ref 0.8–1.3)
DEPRECATED RDW RBC AUTO: 17.2 % (ref 12.4–15.4)
EKG ATRIAL RATE: 64 BPM
EKG DIAGNOSIS: NORMAL
EKG P AXIS: 69 DEGREES
EKG P-R INTERVAL: 290 MS
EKG Q-T INTERVAL: 488 MS
EKG QRS DURATION: 126 MS
EKG QTC CALCULATION (BAZETT): 503 MS
EKG R AXIS: -26 DEGREES
EKG T AXIS: 236 DEGREES
EKG VENTRICULAR RATE: 64 BPM
EOSINOPHIL # BLD: 0 K/UL (ref 0–0.6)
EOSINOPHIL NFR BLD: 0.1 %
GFR SERPLBLD CREATININE-BSD FMLA CKD-EPI: 71 ML/MIN/{1.73_M2}
GLUCOSE SERPL-MCNC: 80 MG/DL (ref 70–99)
HCT VFR BLD AUTO: 38.2 % (ref 40.5–52.5)
HGB BLD-MCNC: 12.8 G/DL (ref 13.5–17.5)
INR PPP: 2.96 (ref 0.86–1.14)
INTERNATIONAL NORMALIZATION RATIO, POC: 3
LYMPHOCYTES # BLD: 0.9 K/UL (ref 1–5.1)
LYMPHOCYTES NFR BLD: 16.1 %
MCH RBC QN AUTO: 30 PG (ref 26–34)
MCHC RBC AUTO-ENTMCNC: 33.4 G/DL (ref 31–36)
MCV RBC AUTO: 89.7 FL (ref 80–100)
MONOCYTES # BLD: 0.6 K/UL (ref 0–1.3)
MONOCYTES NFR BLD: 11.3 %
NEUTROPHILS # BLD: 4 K/UL (ref 1.7–7.7)
NEUTROPHILS NFR BLD: 72 %
PLATELET # BLD AUTO: 262 K/UL (ref 135–450)
PMV BLD AUTO: 8.1 FL (ref 5–10.5)
POTASSIUM SERPL-SCNC: 4 MMOL/L (ref 3.5–5.1)
PROT SERPL-MCNC: 6.5 G/DL (ref 6.4–8.2)
PROTHROMBIN TIME, POC: 0
PROTHROMBIN TIME: 30.2 SEC (ref 12.1–14.9)
RBC # BLD AUTO: 4.26 M/UL (ref 4.2–5.9)
SODIUM SERPL-SCNC: 143 MMOL/L (ref 136–145)
WBC # BLD AUTO: 5.5 K/UL (ref 4–11)

## 2025-07-24 PROCEDURE — 80053 COMPREHEN METABOLIC PANEL: CPT

## 2025-07-24 PROCEDURE — 93005 ELECTROCARDIOGRAM TRACING: CPT | Performed by: EMERGENCY MEDICINE

## 2025-07-24 PROCEDURE — 93010 ELECTROCARDIOGRAM REPORT: CPT | Performed by: INTERNAL MEDICINE

## 2025-07-24 PROCEDURE — 99284 EMERGENCY DEPT VISIT MOD MDM: CPT

## 2025-07-24 PROCEDURE — 85610 PROTHROMBIN TIME: CPT

## 2025-07-24 PROCEDURE — 85025 COMPLETE CBC W/AUTO DIFF WBC: CPT

## 2025-07-24 PROCEDURE — 70450 CT HEAD/BRAIN W/O DYE: CPT

## 2025-07-24 PROCEDURE — 72125 CT NECK SPINE W/O DYE: CPT

## 2025-07-24 PROCEDURE — 6360000004 HC RX CONTRAST MEDICATION: Performed by: EMERGENCY MEDICINE

## 2025-07-24 RX ORDER — IOPAMIDOL 755 MG/ML
75 INJECTION, SOLUTION INTRAVASCULAR
Status: DISCONTINUED | OUTPATIENT
Start: 2025-07-24 | End: 2025-07-24 | Stop reason: HOSPADM

## 2025-07-24 ASSESSMENT — PAIN SCALES - GENERAL: PAINLEVEL_OUTOF10: 0

## 2025-07-24 ASSESSMENT — PAIN - FUNCTIONAL ASSESSMENT
PAIN_FUNCTIONAL_ASSESSMENT: NONE - DENIES PAIN
PAIN_FUNCTIONAL_ASSESSMENT: 0-10

## 2025-07-24 NOTE — DISCHARGE INSTRUCTIONS
The CAT scan imaging looked normal.  Please follow-up with your primary care doctor.  Return for worsening symptoms.

## 2025-07-24 NOTE — ED TRIAGE NOTES
Pt presents to ED from [facility] via [facility] for unwitnessed syncope episode with nickel-sized hematoma to occipital head. Takes warfarin daily for mechanical heart valve. Pt A&Ox4. Denies chest pain, SOB, abd pain, vision changes, headache, or N/V.

## 2025-07-24 NOTE — PROGRESS NOTES
[facility]  Home Care Visit    Lab Results   Component Value Date    INR 3.0 07/24/2025    INR 2.3 07/21/2025    INR 1.50 07/17/2025       Anticoagulation Summary  As of 7/24/2025      INR goal:  2.5-3.5   TTR:  60.0% (4.5 y)   INR used for dosing:  3.0 (7/24/2025)   Warfarin maintenance plan:  15 mg (10 mg x 1.5) every Fri; 10 mg (10 mg x 1) all other days   Weekly warfarin total:  75 mg   Plan last modified:  Mecca Holden RPH (7/18/2025)   Next INR check:  7/28/2025   Priority:  Maintenance   Target end date:  Indefinite    Indications    Anticoagulated on Coumadin [Z79.01]  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  No changes to diet and medications reported.  No concerns reported.    Patient was recently transferred from the nursing unit to assisted living. INR checks are done every Monday and Thursday.    ADDENDUM: Nurse Garett called to let us know that Mr. Clinton sustained a fall today. He hit the back of his head. He was taken to [facility], where a CT scan was performed. She states that the scan was negative for a brain bleed. He does have a purple bruise on his head, which they will monitor. He was able to ambulate back to [place] on his own.    Description    Continue: Warfarin 10 mg (one tablet) daily EXCEPT 15mg every Friday    Takes warfarin in the AM    Ensure 1 can daily (adds an extra 1-2 cans per week)   Eating 1-2 vitamin K green salad (green leaf or jeniffer) per week    Call [phone] with signs or symptoms of bleeding or ANY

## 2025-07-24 NOTE — ED PROVIDER NOTES
[facility] EMERGENCY DEPARTMENT  EMERGENCY DEPARTMENT ENCOUNTER        Patient Name: Leti Clinton  MRN: [id number]  Birthdate 2/19/1934  Date of evaluation: 7/24/2025  Provider: Yaw Cantrell MD  PCP: Kristopher Toribio MD  Note Started: 11:22 AM EDT 7/24/25    CHIEF COMPLAINT       Loss of Consciousness (Pt presents to ED from [facility] via [facility] for unwitnessed syncope episode with nickel-sized hematoma to occipital head. Takes warfarin daily for mechanical heart valve. Pt A&Ox4. Denies chest pain, SOB, abd pain, vision changes, headache, or N/V. )      HISTORY OF PRESENT ILLNESS: 1 or more Elements     History from : Patient    Limitations to history : None    Leti Clinton is a 89 y.o. male who presents for evaluation of syncopal episode.  Patient had a fall, head injury.  He thinks that he lost his balance causing a fall when hitting his head on a side table.  He does take warfarin daily.  Denies any chest pain shortness of breath no vision changes no headache, no weakness.    Nursing Notes were all reviewed and agreed with or any disagreements were addressed in the HPI.    REVIEW OF SYSTEMS :      Review of Systems    Positives and Pertinent negatives as per HPI.     SURGICAL HISTORY     Past Surgical History:   Procedure Laterality Date    AORTIC VALVE REPLACEMENT  1992    CARDIAC CATHETERIZATION  05/09/2022    CATARACT REMOVAL  2021    COLONOSCOPY  2008        COLONOSCOPY  08/01/2012    Dr Bales    COLONOSCOPY  2018    several polyps found follow up in three years    DIAGNOSTIC CARDIAC CATH LAB PROCEDURE  05/09/2022    EYE SURGERY  04/2024       CURRENTMEDICATIONS       Previous Medications    ASPIRIN 81 MG TABLET    Take 1 tablet by mouth daily    CARBIDOPA-LEVODOPA (SINEMET CR)  MG PER EXTENDED RELEASE TABLET    Take 1 tablet by mouth nightly    CARBIDOPA-LEVODOPA (SINEMET)  MG PER TABLET    Take two tablets PO four times a  thinks that he lost his balance.  He has Parkinson's.  He is on warfarin.  No laceration is evident on scalp.    The differential diagnosis associated with the patient's presentation includes, but is not limited to: Intracranial injury, dehydration, intracranial bleed, cervical spine fracture    CT imaging demonstrates healing prior cervical spinous fracture, no other acute fractures evident.  There is no acute intracranial bleed.    CONSULTS: (Who and What was discussed)  None    Discussion with Other Professionals : None        Social Determinants : None    Patient's care impacted by chronic condition(s):   Past Medical History:   Diagnosis Date    Colon polyps     Mechanical heart valve present     Parkinson disease (HCC)     S/P aortic valve replacement with metallic valve     1991         Records Reviewed : None    Clinical information obtained from an independent historian.     Disposition Considerations (include 1 Tests not done, Shared Decision Making, Pt Expectation of Test or Tx.):     The patient will be discharged from the emergency department. The patient was counseled on their diagnosis and any medications prescribed. They were advised on the need for PCP followup. They were counseled on the need to return to the emergency department if any of their symptoms were to worsen, change or have any other concerns. Discharged in stable condition.       I am the Primary Clinician of Record.    FINAL IMPRESSION      1. Closed head injury, initial encounter          DISPOSITION/PLAN     DISPOSITION Decision To Discharge 07/24/2025 12:37:10 PM   DISPOSITION CONDITION Stable           PATIENT REFERRED TO:  Kristopher Toribio MD  [address]  [facility] [id number] [phone]            DISCHARGE MEDICATIONS:  Patient was given scripts for the following medications. I counseled patient how to take these medications:  New Prescriptions    No medications on file       DISCONTINUED

## 2025-07-24 NOTE — ED NOTES
D/C: Order noted for d/c. Pt confirmed d/c paperwork has correct name. Discharge and education instructions reviewed with patient. Teach-back successful.  Pt verbalized understanding and denied questions at this time. No acute distress noted. Patient instructed to follow-up as noted - return to emergency department if symptoms worsen. Patient verbalized understanding. Discharged per EDMD with discharge instructions. Pt discharged to private vehicle. Patient stable upon departure. Thanked patient for choosing [facility] for care.

## 2025-07-28 ENCOUNTER — CARE COORDINATION (OUTPATIENT)
Dept: CARE COORDINATION | Age: 89
End: 2025-07-28

## 2025-07-28 ENCOUNTER — ANTI-COAG VISIT (OUTPATIENT)
Dept: PHARMACY | Age: 89
End: 2025-07-28
Payer: MEDICARE

## 2025-07-28 DIAGNOSIS — Z95.2 HISTORY OF HEART VALVE TRANSPLANT: ICD-10-CM

## 2025-07-28 DIAGNOSIS — Z79.01 ANTICOAGULATED ON COUMADIN: Primary | ICD-10-CM

## 2025-07-28 LAB
INTERNATIONAL NORMALIZATION RATIO, POC: 3.2
PROTHROMBIN TIME, POC: 0

## 2025-07-28 NOTE — PROGRESS NOTES
[facility]  Home Care Visit : [facility]    Lab Results   Component Value Date    INR 3.2 07/28/2025    INR 2.96 (H) 07/24/2025    INR 3.0 07/24/2025       Anticoagulation Summary  As of 7/28/2025      INR goal:  2.5-3.5   TTR:  60.1% (4.5 y)   INR used for dosing:  3.2 (7/28/2025)   Warfarin maintenance plan:  15 mg (10 mg x 1.5) every Fri; 10 mg (10 mg x 1) all other days   Weekly warfarin total:  75 mg   Plan last modified:  Mecca Holden RPH (7/18/2025)   Next INR check:  7/31/2025   Priority:  Maintenance   Target end date:  Indefinite    Indications    Anticoagulated on Coumadin [Z79.01]  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  No changes to diet and medications reported.  No concerns reported.  Plan:   Continue: Warfarin 10 mg (one tablet) daily EXCEPT 15mg every Friday  Next INR on 7-31-25  Description    Continue: Warfarin 10 mg (one tablet) daily EXCEPT 15mg every Friday  Takes warfarin in the AM    Ensure 1 can daily (adds an extra 1-2 cans per week)   Eating 1-2 vitamin K green salad (green leaf or jeniffer) per week    Call [phone] with signs or symptoms of bleeding or ANY medication changes (including over-the-counter medications or herbal supplements).       If significant bleeding occurs please seek immediate medical attention.    Keep the number of servings of vitamin K containing foods (dark green, leafy vegetables) the same each week. Please call if this changes. Salad every other day and a green vegetable most days of the

## 2025-07-28 NOTE — CARE COORDINATION
Confirmation at snf at this time. Spoke to Nanda at [facility]. Will check again at 30 day facility [facility] to update case if still at snf.

## 2025-07-31 ENCOUNTER — ANTI-COAG VISIT (OUTPATIENT)
Dept: PHARMACY | Age: 89
End: 2025-07-31
Payer: MEDICARE

## 2025-07-31 DIAGNOSIS — Z79.01 ANTICOAGULATED ON COUMADIN: Primary | ICD-10-CM

## 2025-07-31 DIAGNOSIS — Z95.2 HISTORY OF HEART VALVE TRANSPLANT: ICD-10-CM

## 2025-07-31 LAB
INTERNATIONAL NORMALIZATION RATIO, POC: 2.9
PROTHROMBIN TIME, POC: 0

## 2025-07-31 NOTE — PROGRESS NOTES
[facility]  Home Care Visit : [facility] [place] 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]                 Anticoagulation Episode Summary       INR check location:  --    Preferred lab:  --    Send INR reminders to:  [facility]    Comments:  EPIC  Consent: 6/30/25  son = Ricardo          Anticoagulation Care Providers       Provider Role Specialty Phone number    Kristopher Toribio MD Referring Pediatrics [phone]            Patient Findings       Negatives:  Signs/symptoms of thrombosis, Signs/symptoms of bleeding, Change in health, Missed doses, Change in medications, Change in diet/appetite, Bruising          Caller: Unspecified (Today,  2:28 PM)  ANTICOAGULATION: Nanda toscano/ [facility] called in INR results of 2.9 today, pt takes 15 mgs of warfarin on Fridays and 10 mgs all other days. No medication or diet changes. Her call back number is [phone] (ask for Nanda) .      Assessment / Plan:  Nanda HENDERSON  ,the home care nurse called with INR results.  No changes to diet and medications reported.  No concerns reported.    Continue: Warfarin 10 mg (one tablet) daily EXCEPT 15mg every Friday.  Next INR on 8-4-25  If next INR is within goal range then, only once weekly INR.     Warfarin medication reviewed and updated on the patient 's home medication list. NO    Instructions given to   For Pharmacy Admin Tracking Only    Intervention Detail: Adherence

## 2025-08-04 PROBLEM — Z95.2: Status: RESOLVED | Noted: 2025-07-10 | Resolved: 2025-08-04

## 2025-08-07 ENCOUNTER — HOSPITAL ENCOUNTER (EMERGENCY)
Age: 89
Discharge: HOME | End: 2025-08-07
Attending: EMERGENCY MEDICINE
Payer: MEDICARE

## 2025-08-07 ENCOUNTER — CARE COORDINATION (OUTPATIENT)
Dept: CARE COORDINATION | Age: 89
End: 2025-08-07

## 2025-08-07 VITALS
OXYGEN SATURATION: 100 % | DIASTOLIC BLOOD PRESSURE: 81 MMHG | WEIGHT: 166.67 LBS | TEMPERATURE: 97.6 F | RESPIRATION RATE: 18 BRPM | BODY MASS INDEX: 23.91 KG/M2 | SYSTOLIC BLOOD PRESSURE: 161 MMHG | HEART RATE: 67 BPM

## 2025-08-07 DIAGNOSIS — G20.B2 PARKINSON'S DISEASE WITH DYSKINESIA AND FLUCTUATING MANIFESTATIONS (HCC): Primary | ICD-10-CM

## 2025-08-07 LAB
INTERNATIONAL NORMALIZATION RATIO, POC: 3.4
PROTHROMBIN TIME, POC: 0

## 2025-08-07 PROCEDURE — 6370000000 HC RX 637 (ALT 250 FOR IP): Performed by: EMERGENCY MEDICINE

## 2025-08-07 PROCEDURE — 99283 EMERGENCY DEPT VISIT LOW MDM: CPT

## 2025-08-07 RX ORDER — CARBIDOPA AND LEVODOPA 25; 100 MG/1; MG/1
1.5 TABLET ORAL ONCE
Status: COMPLETED | OUTPATIENT
Start: 2025-08-07 | End: 2025-08-07

## 2025-08-07 RX ADMIN — CARBIDOPA AND LEVODOPA 1.5 TABLET: 25; 100 TABLET ORAL at 13:20

## 2025-08-07 ASSESSMENT — LIFESTYLE VARIABLES
HOW OFTEN DO YOU HAVE A DRINK CONTAINING ALCOHOL: PATIENT DECLINED
HOW MANY STANDARD DRINKS CONTAINING ALCOHOL DO YOU HAVE ON A TYPICAL DAY: PATIENT DECLINED

## 2025-08-07 ASSESSMENT — PAIN - FUNCTIONAL ASSESSMENT: PAIN_FUNCTIONAL_ASSESSMENT: NONE - DENIES PAIN

## 2025-08-08 ENCOUNTER — ANTI-COAG VISIT (OUTPATIENT)
Dept: PHARMACY | Age: 89
End: 2025-08-08

## 2025-08-08 DIAGNOSIS — Z95.2 HX OF ARTIFICIAL HEART VALVE REPLACEMENT: Primary | ICD-10-CM

## 2025-08-14 ENCOUNTER — ANTI-COAG VISIT (OUTPATIENT)
Dept: PHARMACY | Age: 89
End: 2025-08-14

## 2025-08-14 DIAGNOSIS — Z95.2 HX OF ARTIFICIAL HEART VALVE REPLACEMENT: Primary | ICD-10-CM

## 2025-08-14 LAB
INTERNATIONAL NORMALIZATION RATIO, POC: 3.4
PROTHROMBIN TIME, POC: NORMAL

## 2025-08-28 ENCOUNTER — TELEPHONE (OUTPATIENT)
Dept: PHARMACY | Age: 89
End: 2025-08-28

## 2025-08-28 ENCOUNTER — TELEPHONE (OUTPATIENT)
Dept: CARDIOLOGY CLINIC | Age: 89
End: 2025-08-28

## 2025-08-28 ENCOUNTER — ANTI-COAG VISIT (OUTPATIENT)
Dept: PHARMACY | Age: 89
End: 2025-08-28